# Patient Record
Sex: FEMALE | Race: WHITE | NOT HISPANIC OR LATINO | Employment: OTHER | URBAN - METROPOLITAN AREA
[De-identification: names, ages, dates, MRNs, and addresses within clinical notes are randomized per-mention and may not be internally consistent; named-entity substitution may affect disease eponyms.]

---

## 2017-01-09 ENCOUNTER — OFFICE VISIT (OUTPATIENT)
Dept: CARDIOLOGY | Facility: CLINIC | Age: 77
End: 2017-01-09

## 2017-01-09 VITALS
WEIGHT: 158 LBS | SYSTOLIC BLOOD PRESSURE: 118 MMHG | HEIGHT: 62 IN | HEART RATE: 71 BPM | BODY MASS INDEX: 29.08 KG/M2 | DIASTOLIC BLOOD PRESSURE: 78 MMHG

## 2017-01-09 DIAGNOSIS — E78.2 MIXED HYPERLIPIDEMIA: ICD-10-CM

## 2017-01-09 DIAGNOSIS — I25.10 CORONARY ARTERY DISEASE INVOLVING NATIVE CORONARY ARTERY OF NATIVE HEART WITHOUT ANGINA PECTORIS: ICD-10-CM

## 2017-01-09 DIAGNOSIS — I10 ESSENTIAL HYPERTENSION: Primary | ICD-10-CM

## 2017-01-09 DIAGNOSIS — F48.8 PSYCHOGENIC SYNCOPE: ICD-10-CM

## 2017-01-09 PROCEDURE — 99214 OFFICE O/P EST MOD 30 MIN: CPT | Performed by: PHYSICIAN ASSISTANT

## 2017-01-09 RX ORDER — LOSARTAN POTASSIUM 100 MG/1
100 TABLET ORAL DAILY
Qty: 30 TABLET | Refills: 11 | Status: SHIPPED | OUTPATIENT
Start: 2017-01-09 | End: 2017-02-16

## 2017-01-09 NOTE — PROGRESS NOTES
[facility] at [facility] - Office Note  Nicolle Chavez         [address] [address]  1940   [phone] (home)      LOCATION:  [place] office.  Visit Type: Follow Up.    PCP:  Christelle Gamez,     01/09/17   Nicolle Chavez is a 76 y.o.  female  retired.      Chief Complaint: FU on Coronary Artery Disease, HTN.  Syncope ×1.  PROBLEM LIST:   1. Coronary artery disease:  a. Field ST-elevation myocardial infarction, 11/25/2015, with associated chest pain, jaw pain, diaphoresis, and dyspnea; inferior-anterior ST elevation by outside telemetry strips.   b. Emergent cardiac catheterization, Dr. Isael Rosenbaum: Status post drug-eluting stent to the proximal right coronary artery using a 2.25 x 23 mm Xience Alpine stent.   c. Ejection fraction 55% to 60%.   2. Benign hypertension with recent uncontrolled readings.   3. Dyspnea.   4. Dyslipidemia.   5. Hypothyroidism, on chronic supplementation.   6. 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   7. Lower extremity edema.   8. History of peptic ulcer disease and bleeding ulcer approximately 12 years ago; incomplete database.   9. Family history of coronary disease.   10. Anxiety.   11. Gout.   12. Surgical history:  a. Hysterectomy.   b. Left parathyroidectomy.  c. Ulcer repair.       Allergies   Allergen Reactions   • Crestor [Rosuvastatin Calcium] Myalgia   • Lipitor [Atorvastatin] Myalgia   • Pravachol [Pravastatin Sodium] Myalgia   • Statins    • Zocor [Simvastatin] Myalgia   • Aspirin Rash   • Bactrim [Sulfamethoxazole-Trimethoprim] Rash         Current Outpatient Prescriptions:   •  aspirin 81 MG tablet, Take 1 tablet by mouth daily., Disp: , Rfl:   •  atenolol (TENORMIN) 50 MG tablet, Take 0.5 tablets by mouth daily., Disp: , Rfl:   •  cinacalcet (SENSIPAR) 60 MG tablet, Take 1 tablet by mouth daily., Disp: , Rfl:   •  clopidogrel (PLAVIX) 75 MG tablet, TAKE 1 TABLET  BY MOUTH ONE TIME A DAY , Disp: 30 tablet, Rfl: 10  •  denosumab (PROLIA) 60 MG/ML solution syringe, Inject  under the skin 1 (one) time., Disp: , Rfl:   •  doxazosin (CARDURA) 2 MG tablet, Take 1 tablet by mouth 2 (two) times a day., Disp: , Rfl:   •  Krill Oil 300 MG capsule, Take 1 capsule by mouth daily., Disp: , Rfl:   •  levothyroxine (SYNTHROID, LEVOTHROID) 88 MCG tablet, TAKE 1 TABLET BY MOUTH ONE TIME A DAY , Disp: 90 tablet, Rfl: 1  •  losartan-hydrochlorothiazide (HYZAAR) 100-25 MG per tablet, Take 1 tablet by mouth daily., Disp: , Rfl:   •  magnesium oxide (MAG-OX) 400 MG tablet, Take 400 mg by mouth daily., Disp: , Rfl:   •  Multiple Vitamins-Minerals (MULTIVITAMIN ADULT PO), Take  by mouth Daily., Disp: , Rfl:   •  Multiple Vitamins-Minerals (OCUVITE PO), Take  by mouth Daily., Disp: , Rfl:   •  pitavastatin calcium (LIVALO) 2 MG tablet tablet, Take 1 tablet by mouth every night., Disp: 30 tablet, Rfl: 2    HPI  Nicolle is here for routine follow-up on her coronary disease, hypertension dyslipidemia.  At her last visit she was having bouts of hypotension and dizziness.  The dizziness has resolved but she continues to have profound fluctuations in her blood pressure.  The other day she was at the top of a staircase when she stood up to walk down and had a syncopal event at the last 2 stairs.  She hurt her left leg ,bumped her head on the stairwell, and continues to have discomfort in that leg.  She did not seek medical care at that time.  She did not take her blood pressure at that time.  At its lowest, systolic blood pressure has been 90.  At its highest it has been 150-160.  She is taking the Cardura once a day, atenolol at night, Hyzaar mid afternoon.  Possibly one hour after taking the Hyzaar she feels lethargic and weak.  She has no angina or exertional dyspnea.  She does notice some swelling in her legs at baseline prior to the injury.    The following portions of the patient's history were  "reviewed in the chart and updated as appropriate: allergies, current medications, past family history, past medical history, past social history, past surgical history and problem list.    Review of Systems   Constitution: Positive for weakness.   Cardiovascular: Positive for leg swelling and syncope.   All other systems reviewed and are negative.            height is 62\" (157.5 cm) and weight is 158 lb (71.7 kg). Her blood pressure is 118/78 and her pulse is 71.   Physical Exam   Constitutional: Vital signs are normal. She appears well-developed and well-nourished.   Cardiovascular: Normal rate, regular rhythm, S1 normal, S2 normal, normal heart sounds, intact distal pulses and normal pulses.    Pulmonary/Chest: Effort normal and breath sounds normal. She has no wheezes. She has no rhonchi. She has no rales.   Abdominal: Soft. Normal appearance and bowel sounds are normal. There is no hepatosplenomegaly.   Neurological: She is alert.         Procedures     Assessment/ Plan     Essential hypertension:  I believe her syncopal event was due to orthostasis.  At this time I'm going to continue with the atenolol but have her take it in the morning.  I'm going to stop the Hyzaar and change it to Cozaar 100 mg daily.  She is to take this proximal me 2 hours after the atenolol.  I'm going to cut out Cardura altogether.  She is to keep a blood pressure log for the next week.  We will call her in our clinic next week to review her recordings.  I'm also going to schedule her for a Lexiscan Cardiolite to make sure she's had no changes to her coronary arteries or blood flow.  Lexiscan is being used secondary to her recent leg injury.  I'm also going to reassess her leg injury at that time although I do not physically see bruising, erythema or skin changes.    Coronary artery disease involving native coronary artery of native heart without angina pectoris:  Again, a Lexiscan Cardiolite will be ordered to assess for blood " flow.    Mixed hyperlipidemia:  Continue on Livalo every other day as this is what she can tolerate.    Radha Graves PA-C

## 2017-01-09 NOTE — LETTER
January 9, 2017     Christelle Gamez DO  [address]  [address]  [place] [address]    Patient: Nicolle Chavez   YOB: 1940   Date of Visit: 1/9/2017       Dear Dr. Gamez, :    Thank you for referring Nicolle Chavez to me for evaluation. Below are the relevant portions of my assessment and plan of care.    If you have questions, please do not hesitate to call me. I look forward to following Nicolle along with you.         Sincerely,        FRANCOIS Castro        CC: No Recipients  FRANCOIS Castro  1/9/2017  2:19 PM  Signed       [facility] at [facility] - Office Note  Nicolle Chavez         [address] [facility] [place] [address]  1940   [phone] (home)      LOCATION:  [place] office.  Visit Type: Follow Up.    PCP:  Christelle Gamez DO    01/09/17   Nicolle Chavez is a 76 y.o.  female  retired.      Chief Complaint: FU on Coronary Artery Disease, HTN.  Syncope ×1.  PROBLEM LIST:   1. Coronary artery disease:  a. Field ST-elevation myocardial infarction, 11/25/2015, with associated chest pain, jaw pain, diaphoresis, and dyspnea; inferior-anterior ST elevation by outside telemetry strips.   b. Emergent cardiac catheterization, Dr. Isael Rosenbaum: Status post drug-eluting stent to the proximal right coronary artery using a 2.25 x 23 mm Xience Alpine stent.   c. Ejection fraction 55% to 60%.   2. Benign hypertension with recent uncontrolled readings.   3. Dyspnea.   4. Dyslipidemia.   5. Hypothyroidism, on chronic supplementation.   6. 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   7. Lower extremity edema.   8. History of peptic ulcer disease and bleeding ulcer approximately 12 years ago; incomplete database.   9. Family history of coronary disease.   10. Anxiety.   11. Gout.   12. Surgical history:  a. Hysterectomy.   b. Left parathyroidectomy.  c. Ulcer repair.       Allergies   Allergen  Reactions   • Crestor [Rosuvastatin Calcium] Myalgia   • Lipitor [Atorvastatin] Myalgia   • Pravachol [Pravastatin Sodium] Myalgia   • Statins    • Zocor [Simvastatin] Myalgia   • Aspirin Rash   • Bactrim [Sulfamethoxazole-Trimethoprim] Rash         Current Outpatient Prescriptions:   •  aspirin 81 MG tablet, Take 1 tablet by mouth daily., Disp: , Rfl:   •  atenolol (TENORMIN) 50 MG tablet, Take 0.5 tablets by mouth daily., Disp: , Rfl:   •  cinacalcet (SENSIPAR) 60 MG tablet, Take 1 tablet by mouth daily., Disp: , Rfl:   •  clopidogrel (PLAVIX) 75 MG tablet, TAKE 1 TABLET BY MOUTH ONE TIME A DAY , Disp: 30 tablet, Rfl: 10  •  denosumab (PROLIA) 60 MG/ML solution syringe, Inject  under the skin 1 (one) time., Disp: , Rfl:   •  doxazosin (CARDURA) 2 MG tablet, Take 1 tablet by mouth 2 (two) times a day., Disp: , Rfl:   •  Krill Oil 300 MG capsule, Take 1 capsule by mouth daily., Disp: , Rfl:   •  levothyroxine (SYNTHROID, LEVOTHROID) 88 MCG tablet, TAKE 1 TABLET BY MOUTH ONE TIME A DAY , Disp: 90 tablet, Rfl: 1  •  losartan-hydrochlorothiazide (HYZAAR) 100-25 MG per tablet, Take 1 tablet by mouth daily., Disp: , Rfl:   •  magnesium oxide (MAG-OX) 400 MG tablet, Take 400 mg by mouth daily., Disp: , Rfl:   •  Multiple Vitamins-Minerals (MULTIVITAMIN ADULT PO), Take  by mouth Daily., Disp: , Rfl:   •  Multiple Vitamins-Minerals (OCUVITE PO), Take  by mouth Daily., Disp: , Rfl:   •  pitavastatin calcium (LIVALO) 2 MG tablet tablet, Take 1 tablet by mouth every night., Disp: 30 tablet, Rfl: 2    HPI  Nicolle is here for routine follow-up on her coronary disease, hypertension dyslipidemia.  At her last visit she was having bouts of hypotension and dizziness.  The dizziness has resolved but she continues to have profound fluctuations in her blood pressure.  The other day she was at the top of a staircase when she stood up to walk down and had a syncopal event at the last 2 stairs.  She hurt her left leg ,bumped her head on  "the stairwell, and continues to have discomfort in that leg.  She did not seek medical care at that time.  She did not take her blood pressure at that time.  At its lowest, systolic blood pressure has been 90.  At its highest it has been 150-160.  She is taking the Cardura once a day, atenolol at night, Hyzaar mid afternoon.  Possibly one hour after taking the Hyzaar she feels lethargic and weak.  She has no angina or exertional dyspnea.  She does notice some swelling in her legs at baseline prior to the injury.    The following portions of the patient's history were reviewed in the chart and updated as appropriate: allergies, current medications, past family history, past medical history, past social history, past surgical history and problem list.    Review of Systems   Constitution: Positive for weakness.   Cardiovascular: Positive for leg swelling and syncope.   All other systems reviewed and are negative.            height is 62\" (157.5 cm) and weight is 158 lb (71.7 kg). Her blood pressure is 118/78 and her pulse is 71.   Physical Exam   Constitutional: Vital signs are normal. She appears well-developed and well-nourished.   Cardiovascular: Normal rate, regular rhythm, S1 normal, S2 normal, normal heart sounds, intact distal pulses and normal pulses.    Pulmonary/Chest: Effort normal and breath sounds normal. She has no wheezes. She has no rhonchi. She has no rales.   Abdominal: Soft. Normal appearance and bowel sounds are normal. There is no hepatosplenomegaly.   Neurological: She is alert.         Procedures     Assessment/ Plan     Essential hypertension:  I believe her syncopal event was due to orthostasis.  At this time I'm going to continue with the atenolol but have her take it in the morning.  I'm going to stop the Hyzaar and change it to Cozaar 100 mg daily.  She is to take this proximal me 2 hours after the atenolol.  I'm going to cut out Cardura altogether.  She is to keep a blood pressure log for " the next week.  We will call her in our clinic next week to review her recordings.  I'm also going to schedule her for a Lexiscan Cardiolite to make sure she's had no changes to her coronary arteries or blood flow.  Lexiscan is being used secondary to her recent leg injury.  I'm also going to reassess her leg injury at that time although I do not physically see bruising, erythema or skin changes.    Coronary artery disease involving native coronary artery of native heart without angina pectoris:  Again, a Lexiscan Cardiolite will be ordered to assess for blood flow.    Mixed hyperlipidemia:  Continue on Livalo every other day as this is what she can tolerate.    Radha Graves PA-C

## 2017-01-10 ENCOUNTER — APPOINTMENT (OUTPATIENT)
Dept: CARDIOLOGY | Facility: HOSPITAL | Age: 77
End: 2017-01-10

## 2017-01-16 ENCOUNTER — TELEPHONE (OUTPATIENT)
Dept: CARDIOLOGY | Facility: CLINIC | Age: 77
End: 2017-01-16

## 2017-01-18 ENCOUNTER — HOSPITAL ENCOUNTER (OUTPATIENT)
Dept: CARDIOLOGY | Facility: HOSPITAL | Age: 77
Discharge: HOME | End: 2017-01-18

## 2017-01-18 ENCOUNTER — HOSPITAL ENCOUNTER (OUTPATIENT)
Dept: CARDIOLOGY | Facility: HOSPITAL | Age: 77
Discharge: HOME | End: 2017-01-18
Admitting: PHYSICIAN ASSISTANT

## 2017-01-18 DIAGNOSIS — F48.8 PSYCHOGENIC SYNCOPE: ICD-10-CM

## 2017-01-18 DIAGNOSIS — I25.10 CORONARY ARTERY DISEASE INVOLVING NATIVE CORONARY ARTERY OF NATIVE HEART WITHOUT ANGINA PECTORIS: ICD-10-CM

## 2017-01-18 LAB
BH CV NUCLEAR PRIOR STUDY: 2
BH CV STRESS BP STAGE 1: NORMAL
BH CV STRESS BP STAGE 2: NORMAL
BH CV STRESS BP STAGE 3: NORMAL
BH CV STRESS COMMENTS STAGE 1: NORMAL
BH CV STRESS COMMENTS STAGE 2: NORMAL
BH CV STRESS DOSE REGADENOSON STAGE 1: 0.4
BH CV STRESS DURATION MIN STAGE 1: 1
BH CV STRESS DURATION MIN STAGE 2: 3
BH CV STRESS DURATION MIN STAGE 3: 6
BH CV STRESS DURATION SEC STAGE 1: 0
BH CV STRESS DURATION SEC STAGE 2: 0
BH CV STRESS DURATION SEC STAGE 3: 0
BH CV STRESS HR STAGE 1: 71
BH CV STRESS HR STAGE 2: 96
BH CV STRESS HR STAGE 3: 86
BH CV STRESS PROTOCOL 1: NORMAL
BH CV STRESS RECOVERY BP: NORMAL MMHG
BH CV STRESS RECOVERY HR: 86 BPM
BH CV STRESS STAGE 1: 1
BH CV STRESS STAGE 2: 2
BH CV STRESS STAGE 3: 3
LV EF NUC BP: 68 %
MAXIMAL PREDICTED HEART RATE: 142 BPM
PERCENT MAX PREDICTED HR: 72.54 %
STRESS BASELINE BP: NORMAL MMHG
STRESS BASELINE HR: 62 BPM
STRESS PERCENT HR: 85 %
STRESS POST PEAK BP: NORMAL MMHG
STRESS POST PEAK HR: 103 BPM
STRESS TARGET HR: 121 BPM

## 2017-01-18 PROCEDURE — 78452 HT MUSCLE IMAGE SPECT MULT: CPT | Performed by: INTERNAL MEDICINE

## 2017-01-18 PROCEDURE — 93018 CV STRESS TEST I&R ONLY: CPT | Performed by: INTERNAL MEDICINE

## 2017-01-18 PROCEDURE — 78452 HT MUSCLE IMAGE SPECT MULT: CPT

## 2017-01-18 PROCEDURE — 25010000002 ONDANSETRON PER 1 MG: Performed by: INTERNAL MEDICINE

## 2017-01-18 PROCEDURE — 0 TECHNETIUM SESTAMIBI: Performed by: INTERNAL MEDICINE

## 2017-01-18 PROCEDURE — 25010000002 REGADENOSON 0.4 MG/5ML SOLUTION: Performed by: INTERNAL MEDICINE

## 2017-01-18 PROCEDURE — A9500 TC99M SESTAMIBI: HCPCS | Performed by: INTERNAL MEDICINE

## 2017-01-18 PROCEDURE — 93017 CV STRESS TEST TRACING ONLY: CPT

## 2017-01-18 RX ORDER — ONDANSETRON 2 MG/ML
4 INJECTION INTRAMUSCULAR; INTRAVENOUS ONCE
Status: COMPLETED | OUTPATIENT
Start: 2017-01-18 | End: 2017-01-18

## 2017-01-18 RX ADMIN — Medication 1 DOSE: at 08:15

## 2017-01-18 RX ADMIN — Medication 1 DOSE: at 09:50

## 2017-01-18 RX ADMIN — ONDANSETRON 4 MG: 2 INJECTION, SOLUTION INTRAMUSCULAR; INTRAVENOUS at 09:58

## 2017-01-18 RX ADMIN — REGADENOSON 0.4 MG: 0.08 INJECTION, SOLUTION INTRAVENOUS at 09:50

## 2017-01-18 NOTE — TELEPHONE ENCOUNTER
Spoke with Patient to let her know that FRANCOIS Mejía did not want to change any medications at this time. Patient stated that her B/P was high this am for her Stress Test.  She stated it was 199/100 or something like that, Patient did not know heart rate at that time. Will let Radha know.

## 2017-01-25 ENCOUNTER — TELEPHONE (OUTPATIENT)
Dept: CARDIOLOGY | Facility: CLINIC | Age: 77
End: 2017-01-25

## 2017-01-25 NOTE — TELEPHONE ENCOUNTER
Called to check on Patient's B/P's. Patient stated she is still experiencing high B/P's, 182/113, 160/103, 143/94.  She wanted to know if you would like to make any medication  changes or should she make an apt to see you? Please Advise. I let Patient know it probably would be Monday before I got back with her. She verbalized understanding and was ok with that.

## 2017-01-30 RX ORDER — DOXAZOSIN 2 MG/1
2 TABLET ORAL NIGHTLY
Qty: 30 TABLET | Refills: 6 | Status: SHIPPED | OUTPATIENT
Start: 2017-01-30 | End: 2017-04-10

## 2017-01-30 NOTE — TELEPHONE ENCOUNTER
Spoke with Patient to let her know Radha Graves would be ordering her Cardura 2 mg Q PM, Medication sent to [facility] in [facility]. Per EHR. Patient to call and let us know how her B/P's are doing. Patient verbalized understanding.

## 2017-02-16 ENCOUNTER — OFFICE VISIT (OUTPATIENT)
Dept: CARDIOLOGY | Facility: CLINIC | Age: 77
End: 2017-02-16

## 2017-02-16 VITALS
HEIGHT: 63 IN | DIASTOLIC BLOOD PRESSURE: 100 MMHG | SYSTOLIC BLOOD PRESSURE: 198 MMHG | WEIGHT: 160 LBS | BODY MASS INDEX: 28.35 KG/M2 | HEART RATE: 76 BPM

## 2017-02-16 DIAGNOSIS — E78.2 MIXED HYPERLIPIDEMIA: ICD-10-CM

## 2017-02-16 DIAGNOSIS — I25.10 CORONARY ARTERY DISEASE INVOLVING NATIVE CORONARY ARTERY OF NATIVE HEART WITHOUT ANGINA PECTORIS: ICD-10-CM

## 2017-02-16 DIAGNOSIS — I10 ESSENTIAL HYPERTENSION: Primary | ICD-10-CM

## 2017-02-16 PROCEDURE — 99214 OFFICE O/P EST MOD 30 MIN: CPT | Performed by: PHYSICIAN ASSISTANT

## 2017-02-16 RX ORDER — CHLORTHALIDONE 25 MG/1
50 TABLET ORAL DAILY
Qty: 30 TABLET | Refills: 11 | Status: SHIPPED | OUTPATIENT
Start: 2017-02-16 | End: 2017-04-10

## 2017-02-16 RX ORDER — METOPROLOL SUCCINATE 50 MG/1
50 TABLET, EXTENDED RELEASE ORAL DAILY
Qty: 30 TABLET | Refills: 11 | Status: SHIPPED | OUTPATIENT
Start: 2017-02-16 | End: 2017-03-09

## 2017-02-16 NOTE — PROGRESS NOTES
[facility] at [facility] - Office Note  Nicolle Chavez         [address] [facility] [address]  1940   [phone] (home)      LOCATION:  [place] office.  Visit Type: Follow Up.    PCP:  Christelle Gamez,     02/16/17   Nicolle Chavez is a 76 y.o.  female  retired.      Chief Complaint: Follow Up on CAD, HTN.  PROBLEM LIST:   1. Coronary artery disease:  a. Field ST-elevation myocardial infarction, 11/25/2015, with associated chest pain, jaw pain, diaphoresis, and dyspnea; inferior-anterior ST elevation by outside telemetry strips.   b. Emergent cardiac catheterization, Dr. Isael Rosenbaum: Status post drug-eluting stent to the proximal right coronary artery using a 2.25 x 23 mm Xience Alpine stent.   c. Ejection fraction 55% to 60%.   2. Uncontrolled Essential HTN.  3. Dyspnea.   4. Dyslipidemia.   5. Hypothyroidism, on chronic supplementation.   6. 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   7. Lower extremity edema.   8. History of peptic ulcer disease and bleeding ulcer approximately 12 years ago; incomplete database.   9. Family history of coronary disease.   10. Anxiety.   11. Gout.   12. Surgical history:  a. Hysterectomy.   b. Left parathyroidectomy.  c. Ulcer repair.          Allergies   Allergen Reactions   • Crestor [Rosuvastatin Calcium] Myalgia   • Lipitor [Atorvastatin] Myalgia   • Pravachol [Pravastatin Sodium] Myalgia   • Statins    • Zocor [Simvastatin] Myalgia   • Aspirin Rash   • Bactrim [Sulfamethoxazole-Trimethoprim] Rash         Current Outpatient Prescriptions:   •  aspirin 81 MG tablet, Take 1 tablet by mouth daily., Disp: , Rfl:   •  cinacalcet (SENSIPAR) 60 MG tablet, Take 1 tablet by mouth daily., Disp: , Rfl:   •  clopidogrel (PLAVIX) 75 MG tablet, TAKE 1 TABLET BY MOUTH ONE TIME A DAY , Disp: 30 tablet, Rfl: 10  •  denosumab (PROLIA) 60 MG/ML solution syringe, Inject 60 mg under the skin 1 (One)  Time. Yearly, Disp: , Rfl:   •  doxazosin (CARDURA) 2 MG tablet, Take 1 tablet by mouth Every Night., Disp: 30 tablet, Rfl: 6  •  Krill Oil 300 MG capsule, Take 1 capsule by mouth daily., Disp: , Rfl:   •  levothyroxine (SYNTHROID, LEVOTHROID) 88 MCG tablet, TAKE 1 TABLET BY MOUTH ONE TIME A DAY , Disp: 90 tablet, Rfl: 1  •  magnesium oxide (MAG-OX) 400 MG tablet, Take 400 mg by mouth daily., Disp: , Rfl:   •  Multiple Vitamins-Minerals (OCUVITE PO), Take  by mouth Daily., Disp: , Rfl:   •  pitavastatin calcium (LIVALO) 2 MG tablet tablet, Take 1 tablet by mouth every night. (Patient taking differently: Take 2 mg by mouth Every Other Day. At night.), Disp: 30 tablet, Rfl: 2  •  chlorthalidone (HYGROTON) 25 MG tablet, Take 2 tablets by mouth Daily., Disp: 30 tablet, Rfl: 11  •  metoprolol succinate XL (TOPROL-XL) 50 MG 24 hr tablet, Take 1 tablet by mouth Daily., Disp: 30 tablet, Rfl: 11    LADARIUS Valverde is here for a follow-up on her hypertension and stress test.  Last time we saw her, she was having elevated readings.  I ordered a stress test as it had been a while since her coronary disease was evaluated.  The stress test shows a normal EF and no evidence of ischemia.  She continues to have elevated blood pressure readings particularly first thing in the morning and late evening.  She also complains of swelling in her legs since coming off the Hyzaar and would like to be placed back on a diuretic.  She's had no recent blood work to monitor her renal function.  She denies chest pain, denies dizziness or syncope.  She does feel like there is a pressure behind her eyes and swimmy headed at times when she has a lot going on.    The following portions of the patient's history were reviewed in the chart and updated as appropriate: allergies, current medications, past family history, past medical history, past social history, past surgical history and problem list.    Review of Systems   Constitution: Positive for  "malaise/fatigue.   Cardiovascular: Positive for leg swelling.   Neurological: Positive for light-headedness.   All other systems reviewed and are negative.        height is 62.5\" (158.8 cm) and weight is 160 lb (72.6 kg). Her blood pressure is 198/100 (abnormal) and her pulse is 76.   Physical Exam   Constitutional: Vital signs are normal. She appears well-developed and well-nourished.   Cardiovascular: Normal rate, regular rhythm, S1 normal, S2 normal, normal heart sounds, intact distal pulses and normal pulses.    Pulmonary/Chest: Effort normal and breath sounds normal. She has no wheezes. She has no rhonchi. She has no rales.   Abdominal: Soft. Normal appearance and bowel sounds are normal. There is no hepatosplenomegaly.   Neurological: She is alert.         Procedures     Assessment/ Plan   Essential hypertension:  I reviewed Nicolle's case with Dr. Isael Garzon who is in clinic today.  We reviewed her medications and her past medical history.  He suggests stopping the atenolol and replacing it with Toprol-XL 50 mg daily.  He suggests taking her off the losartan and replacing it with a Niharika 40 mg daily.  For her swelling he recommends chlorthalidone over HCTZ, 50 mg by mouth daily with monitoring of her renal function.  I wrote this down on an instruction sheet for the patient and went over it verbally as well.  A BMP is to be drawn next Wednesday, February 22 and she will follow-up with me next Thursday, February 23 in the [facility].  Further recommendations will be based on blood pressure readings and patient's symptoms at that time.  I spent 25 minutes discussing hypertension alone.  -     metoprolol succinate XL (TOPROL-XL) 50 MG 24 hr tablet; Take 1 tablet by mouth Daily.  -     chlorthalidone (HYGROTON) 25 MG tablet; Take 2 tablets by mouth Daily.        -     edarbi 40 mg 1 tablet by mouth daily.  No Rx given.  Samples given.  Mixed hyperlipidemia:  Continue on Livalo with routine bloodwork by " PCP.    Coronary artery disease involving native coronary artery of native heart without angina pectoris:  Stable and asymptomatic.  I reviewed the results of her normal stress test with her.      Radha Graves PA-C  2/16/2017 12:36 PM      EMR Dragon/Transcription disclaimer:   Much of this encounter note is an electronic transcription/translation of spoken language to printed text. The electronic translation of spoken language may permit erroneous, or at times, nonsensical words or phrases to be inadvertently transcribed; Although I have reviewed the note for such errors, some may still exist.

## 2017-02-22 ENCOUNTER — APPOINTMENT (OUTPATIENT)
Dept: LAB | Facility: HOSPITAL | Age: 77
End: 2017-02-22

## 2017-02-22 LAB
ANION GAP SERPL CALCULATED.3IONS-SCNC: 6 MMOL/L (ref 3–11)
BUN BLD-MCNC: 25 MG/DL (ref 9–23)
BUN/CREAT SERPL: 20.8 (ref 7–25)
CALCIUM SPEC-SCNC: 10.7 MG/DL (ref 8.7–10.4)
CHLORIDE SERPL-SCNC: 98 MMOL/L (ref 99–109)
CO2 SERPL-SCNC: 34 MMOL/L (ref 20–31)
CREAT BLD-MCNC: 1.2 MG/DL (ref 0.6–1.3)
GFR SERPL CREATININE-BSD FRML MDRD: 43 ML/MIN/1.73
GLUCOSE BLD-MCNC: 89 MG/DL (ref 70–100)
POTASSIUM BLD-SCNC: 3.8 MMOL/L (ref 3.5–5.5)
SODIUM BLD-SCNC: 138 MMOL/L (ref 132–146)

## 2017-02-22 PROCEDURE — 80048 BASIC METABOLIC PNL TOTAL CA: CPT | Performed by: PHYSICIAN ASSISTANT

## 2017-02-22 PROCEDURE — 36415 COLL VENOUS BLD VENIPUNCTURE: CPT | Performed by: PHYSICIAN ASSISTANT

## 2017-02-23 ENCOUNTER — OFFICE VISIT (OUTPATIENT)
Dept: CARDIOLOGY | Facility: CLINIC | Age: 77
End: 2017-02-23

## 2017-02-23 VITALS
WEIGHT: 154 LBS | BODY MASS INDEX: 28.34 KG/M2 | SYSTOLIC BLOOD PRESSURE: 128 MMHG | HEIGHT: 62 IN | DIASTOLIC BLOOD PRESSURE: 80 MMHG | HEART RATE: 65 BPM

## 2017-02-23 DIAGNOSIS — I25.10 CORONARY ARTERY DISEASE INVOLVING NATIVE CORONARY ARTERY OF NATIVE HEART WITHOUT ANGINA PECTORIS: ICD-10-CM

## 2017-02-23 DIAGNOSIS — E78.2 MIXED HYPERLIPIDEMIA: ICD-10-CM

## 2017-02-23 DIAGNOSIS — I10 ESSENTIAL HYPERTENSION: Primary | ICD-10-CM

## 2017-02-23 PROCEDURE — 99212 OFFICE O/P EST SF 10 MIN: CPT | Performed by: PHYSICIAN ASSISTANT

## 2017-02-23 NOTE — PROGRESS NOTES
[facility] at [facility] - Office Note  Nicolle Chavez         [address] [address]  1940   [phone] (home)      LOCATION:  [facility].  Visit Type: Follow Up.    PCP:  Christelle Gamez,     02/23/17   Nicolle Chavez is a 76 y.o.  female  retired.      Chief Complaint: Follow up on HTN.  PROBLEM LIST:   1. Coronary artery disease:  a. Field ST-elevation myocardial infarction, 11/25/2015, with associated chest pain, jaw pain, diaphoresis, and dyspnea; inferior-anterior ST elevation by outside telemetry strips.   b. Emergent cardiac catheterization, Dr. Isael Rosenbaum: Status post drug-eluting stent to the proximal right coronary artery using a 2.25 x 23 mm Xience Alpine stent.   c. Ejection fraction 55% to 60%.   2. Uncontrolled Essential HTN.  3. Dyspnea.   4. Dyslipidemia.   5. Hypothyroidism, on chronic supplementation.   6. 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   7. Lower extremity edema.   8. History of peptic ulcer disease and bleeding ulcer approximately 12 years ago; incomplete database.   9. Family history of coronary disease.   10. Anxiety.   11. Gout.   12. Surgical history:  a. Hysterectomy.   b. Left parathyroidectomy.  c. Ulcer repair.              Allergies   Allergen Reactions   • Crestor [Rosuvastatin Calcium] Myalgia   • Lipitor [Atorvastatin] Myalgia   • Pravachol [Pravastatin Sodium] Myalgia   • Statins    • Zocor [Simvastatin] Myalgia   • Aspirin Rash   • Bactrim [Sulfamethoxazole-Trimethoprim] Rash         Current Outpatient Prescriptions:   •  aspirin 81 MG tablet, Take 1 tablet by mouth daily., Disp: , Rfl:   •  Azilsartan Medoxomil (EDARBI) 40 MG tablet, Take 40 mg by mouth Daily., Disp: , Rfl:   •  chlorthalidone (HYGROTON) 25 MG tablet, Take 2 tablets by mouth Daily., Disp: 30 tablet, Rfl: 11  •  cinacalcet (SENSIPAR) 60 MG tablet, Take 1 tablet by mouth daily., Disp: , Rfl:   •   clopidogrel (PLAVIX) 75 MG tablet, TAKE 1 TABLET BY MOUTH ONE TIME A DAY , Disp: 30 tablet, Rfl: 10  •  denosumab (PROLIA) 60 MG/ML solution syringe, Inject 60 mg under the skin 1 (One) Time. Yearly, Disp: , Rfl:   •  doxazosin (CARDURA) 2 MG tablet, Take 1 tablet by mouth Every Night., Disp: 30 tablet, Rfl: 6  •  Krill Oil 300 MG capsule, Take 1 capsule by mouth daily., Disp: , Rfl:   •  levothyroxine (SYNTHROID, LEVOTHROID) 88 MCG tablet, TAKE 1 TABLET BY MOUTH ONE TIME A DAY , Disp: 90 tablet, Rfl: 1  •  magnesium oxide (MAG-OX) 400 MG tablet, Take 400 mg by mouth daily., Disp: , Rfl:   •  metoprolol succinate XL (TOPROL-XL) 50 MG 24 hr tablet, Take 1 tablet by mouth Daily., Disp: 30 tablet, Rfl: 11  •  Multiple Vitamins-Minerals (OCUVITE PO), Take  by mouth Daily., Disp: , Rfl:   •  pitavastatin calcium (LIVALO) 2 MG tablet tablet, Take 1 tablet by mouth every night. (Patient taking differently: Take 2 mg by mouth Every Other Day. At night.), Disp: 30 tablet, Rfl: 2    HPI  Nicolle is here today for a 1 week scheduled appt to follow up on poorly controlled HTN with multiple medications adjustments made at the last office visit.  For the first few days, her blood pressure remained high and then it has slowly lowered.  She is now running hypo-tensive and is fairly asymptomatic.  She's had no falls or syncope but feels weak and even nauseated.  Her blood pressures are only low first thing in the morning until approximately 10 AM.  Other than feeling like this, she's had no perceived side effects from her medications otherwise.        The following portions of the patient's history were reviewed in the chart and updated as appropriate: allergies, current medications, past family history, past medical history, past social history, past surgical history and problem list.    Review of Systems   Constitution: Positive for weakness.   Gastrointestinal: Positive for nausea.   All other systems reviewed and are negative.    "     height is 62\" (157.5 cm) and weight is 154 lb (69.9 kg). Her blood pressure is 128/80 and her pulse is 65.   Physical Exam   Constitutional: Vital signs are normal. She appears well-developed and well-nourished.   Cardiovascular: Normal rate, regular rhythm, S1 normal, S2 normal, normal heart sounds, intact distal pulses and normal pulses.    Pulmonary/Chest: Effort normal and breath sounds normal. She has no wheezes. She has no rhonchi. She has no rales.   Abdominal: Soft. Normal appearance and bowel sounds are normal. There is no hepatosplenomegaly.   Neurological: She is alert.         Procedures   I reviewed her BMP from February 22, 2017: BUNs 25, creatinine 1.2.  We'll continue to monitor with diuretics.  Assessment/ Plan     Essential hypertension:  Much better control on the current medical regimen.  However, she's having hypotension particularly in the morning hours.  I'm going to stop her Cardura which she takes at night.  This is to start today.  We will call her Wednesday morning to see how her blood pressures have been over the weekend.  If she continues to have low blood pressure readings in the morning, we could consider cutting back on her chlorthalidone or Toprol-XL.  I did review her blood work from yesterday which is essentially within normal limits.  Further recommendations will be based on blood pressure readings over the weekend.  Otherwise, she is to follow-up with us in 3 months and [place] or sooner when necessary.    Mixed hyperlipidemia:  Continue Livalo and appropriate diet.  Get routine bloodwork with PCP.    Coronary artery disease involving native coronary artery of native heart without angina pectoris:  Stable and asymptomatic.  Continue on medications.        Radha Graves PA-C  2/23/2017 12:54 PM      EMR Dragon/Transcription disclaimer:   Much of this encounter note is an electronic transcription/translation of spoken language to printed text. The electronic translation " of spoken language may permit erroneous, or at times, nonsensical words or phrases to be inadvertently transcribed; Although I have reviewed the note for such errors, some may still exist.

## 2017-02-27 ENCOUNTER — TELEPHONE (OUTPATIENT)
Dept: CARDIOLOGY | Facility: CLINIC | Age: 77
End: 2017-02-27

## 2017-02-27 NOTE — TELEPHONE ENCOUNTER
Spoke with Patient to find out how she is doing After stopping her Cardura, Patient stated still feels a little weak, SOA after climbing stairs. B/P 2/23 AM 85/56 PM  117/80,  2/24  104/82,  2/25  /74, PM 1/2/70,  2/26 /73, /85.  No changes at this time per FRANCOIS Mejía. Prescription for Edarbi 40 mg QD sent to [facility] per EHR. Patient to call with any concerns or problems. Patient verbalized understanding.

## 2017-03-03 ENCOUNTER — HOSPITAL ENCOUNTER (EMERGENCY)
Facility: HOSPITAL | Age: 77
Discharge: HOME | End: 2017-03-03
Attending: EMERGENCY MEDICINE | Admitting: EMERGENCY MEDICINE

## 2017-03-03 ENCOUNTER — APPOINTMENT (OUTPATIENT)
Dept: GENERAL RADIOLOGY | Facility: HOSPITAL | Age: 77
End: 2017-03-03

## 2017-03-03 VITALS
RESPIRATION RATE: 16 BRPM | DIASTOLIC BLOOD PRESSURE: 93 MMHG | SYSTOLIC BLOOD PRESSURE: 146 MMHG | TEMPERATURE: 97.9 F | WEIGHT: 156 LBS | HEART RATE: 77 BPM | BODY MASS INDEX: 28.71 KG/M2 | OXYGEN SATURATION: 95 % | HEIGHT: 62 IN

## 2017-03-03 DIAGNOSIS — N39.0 URINARY TRACT INFECTION WITHOUT HEMATURIA, SITE UNSPECIFIED: ICD-10-CM

## 2017-03-03 DIAGNOSIS — E86.0 DEHYDRATION: ICD-10-CM

## 2017-03-03 DIAGNOSIS — E83.52 HYPERCALCEMIA: ICD-10-CM

## 2017-03-03 DIAGNOSIS — R53.1 GENERALIZED WEAKNESS: Primary | ICD-10-CM

## 2017-03-03 LAB
ALBUMIN SERPL-MCNC: 4.5 G/DL (ref 3.5–5)
ALBUMIN/GLOB SERPL: 1.4 G/DL (ref 1–2)
ALP SERPL-CCNC: 89 U/L (ref 38–126)
ALT SERPL W P-5'-P-CCNC: 24 U/L (ref 13–69)
ANION GAP SERPL CALCULATED.3IONS-SCNC: 14.5 MMOL/L
AST SERPL-CCNC: 23 U/L (ref 15–46)
BACTERIA UR QL AUTO: ABNORMAL /HPF
BASOPHILS # BLD AUTO: 0.07 10*3/MM3 (ref 0–0.2)
BASOPHILS NFR BLD AUTO: 0.9 % (ref 0–2.5)
BILIRUB SERPL-MCNC: 1 MG/DL (ref 0.2–1.3)
BILIRUB UR QL STRIP: NEGATIVE
BUN BLD-MCNC: 34 MG/DL (ref 7–20)
BUN/CREAT SERPL: 21.3 (ref 7.1–23.5)
CALCIUM SPEC-SCNC: 12.5 MG/DL (ref 8.4–10.2)
CHLORIDE SERPL-SCNC: 89 MMOL/L (ref 98–107)
CLARITY UR: CLEAR
CO2 SERPL-SCNC: 35 MMOL/L (ref 26–30)
COLOR UR: YELLOW
CREAT BLD-MCNC: 1.6 MG/DL (ref 0.6–1.3)
DEPRECATED RDW RBC AUTO: 41.4 FL (ref 37–54)
EOSINOPHIL # BLD AUTO: 0.19 10*3/MM3 (ref 0–0.7)
EOSINOPHIL NFR BLD AUTO: 2.4 % (ref 0–7)
ERYTHROCYTE [DISTWIDTH] IN BLOOD BY AUTOMATED COUNT: 12.6 % (ref 11.5–14.5)
GFR SERPL CREATININE-BSD FRML MDRD: 31 ML/MIN/1.73
GLOBULIN UR ELPH-MCNC: 3.3 GM/DL
GLUCOSE BLD-MCNC: 116 MG/DL (ref 74–98)
GLUCOSE UR STRIP-MCNC: NEGATIVE MG/DL
HCT VFR BLD AUTO: 39.7 % (ref 37–47)
HGB BLD-MCNC: 13.5 G/DL (ref 12–16)
HGB UR QL STRIP.AUTO: NEGATIVE
HYALINE CASTS UR QL AUTO: ABNORMAL /LPF
IMM GRANULOCYTES # BLD: 0.03 10*3/MM3 (ref 0–0.06)
IMM GRANULOCYTES NFR BLD: 0.4 % (ref 0–0.6)
KETONES UR QL STRIP: ABNORMAL
LEUKOCYTE ESTERASE UR QL STRIP.AUTO: ABNORMAL
LIPASE SERPL-CCNC: 63 U/L (ref 23–300)
LYMPHOCYTES # BLD AUTO: 1.24 10*3/MM3 (ref 0.6–3.4)
LYMPHOCYTES NFR BLD AUTO: 15.6 % (ref 10–50)
MCH RBC QN AUTO: 30.3 PG (ref 27–31)
MCHC RBC AUTO-ENTMCNC: 34 G/DL (ref 30–37)
MCV RBC AUTO: 89 FL (ref 81–99)
MONOCYTES # BLD AUTO: 1.46 10*3/MM3 (ref 0–0.9)
MONOCYTES NFR BLD AUTO: 18.4 % (ref 0–12)
NEUTROPHILS # BLD AUTO: 4.94 10*3/MM3 (ref 2–6.9)
NEUTROPHILS NFR BLD AUTO: 62.3 % (ref 37–80)
NITRITE UR QL STRIP: NEGATIVE
NRBC BLD MANUAL-RTO: 0 /100 WBC (ref 0–0)
PH UR STRIP.AUTO: 6 [PH] (ref 5–8)
PLATELET # BLD AUTO: 188 10*3/MM3 (ref 130–400)
PMV BLD AUTO: 10.4 FL (ref 6–12)
POTASSIUM BLD-SCNC: 3.5 MMOL/L (ref 3.5–5.1)
PROT SERPL-MCNC: 7.8 G/DL (ref 6.3–8.2)
PROT UR QL STRIP: ABNORMAL
RBC # BLD AUTO: 4.46 10*6/MM3 (ref 4.2–5.4)
RBC # UR: ABNORMAL /HPF
REF LAB TEST METHOD: ABNORMAL
SODIUM BLD-SCNC: 135 MMOL/L (ref 137–145)
SP GR UR STRIP: 1.01 (ref 1–1.03)
SQUAMOUS #/AREA URNS HPF: ABNORMAL /HPF
TROPONIN I SERPL-MCNC: 0.03 NG/ML (ref 0–0.03)
UROBILINOGEN UR QL STRIP: ABNORMAL
WBC NRBC COR # BLD: 7.93 10*3/MM3 (ref 4.8–10.8)
WBC UR QL AUTO: ABNORMAL /HPF

## 2017-03-03 PROCEDURE — 84484 ASSAY OF TROPONIN QUANT: CPT | Performed by: PHYSICIAN ASSISTANT

## 2017-03-03 PROCEDURE — 80053 COMPREHEN METABOLIC PANEL: CPT | Performed by: PHYSICIAN ASSISTANT

## 2017-03-03 PROCEDURE — 99284 EMERGENCY DEPT VISIT MOD MDM: CPT

## 2017-03-03 PROCEDURE — 81001 URINALYSIS AUTO W/SCOPE: CPT | Performed by: PHYSICIAN ASSISTANT

## 2017-03-03 PROCEDURE — 93005 ELECTROCARDIOGRAM TRACING: CPT | Performed by: PHYSICIAN ASSISTANT

## 2017-03-03 PROCEDURE — 71020 HC CHEST PA AND LATERAL: CPT

## 2017-03-03 PROCEDURE — 25010000002 ONDANSETRON PER 1 MG: Performed by: PHYSICIAN ASSISTANT

## 2017-03-03 PROCEDURE — 96374 THER/PROPH/DIAG INJ IV PUSH: CPT

## 2017-03-03 PROCEDURE — 96361 HYDRATE IV INFUSION ADD-ON: CPT

## 2017-03-03 PROCEDURE — 87086 URINE CULTURE/COLONY COUNT: CPT | Performed by: PHYSICIAN ASSISTANT

## 2017-03-03 PROCEDURE — 83690 ASSAY OF LIPASE: CPT | Performed by: PHYSICIAN ASSISTANT

## 2017-03-03 PROCEDURE — 85025 COMPLETE CBC W/AUTO DIFF WBC: CPT | Performed by: PHYSICIAN ASSISTANT

## 2017-03-03 RX ORDER — ONDANSETRON 2 MG/ML
4 INJECTION INTRAMUSCULAR; INTRAVENOUS ONCE
Status: COMPLETED | OUTPATIENT
Start: 2017-03-03 | End: 2017-03-03

## 2017-03-03 RX ORDER — ONDANSETRON 4 MG/1
4 TABLET, ORALLY DISINTEGRATING ORAL EVERY 6 HOURS PRN
Qty: 12 TABLET | Refills: 0 | Status: SHIPPED | OUTPATIENT
Start: 2017-03-03 | End: 2017-04-10

## 2017-03-03 RX ORDER — SODIUM CHLORIDE 0.9 % (FLUSH) 0.9 %
10 SYRINGE (ML) INJECTION AS NEEDED
Status: DISCONTINUED | OUTPATIENT
Start: 2017-03-03 | End: 2017-03-03 | Stop reason: HOSPADM

## 2017-03-03 RX ORDER — CEPHALEXIN 500 MG/1
500 CAPSULE ORAL 2 TIMES DAILY
Qty: 14 CAPSULE | Refills: 0 | Status: SHIPPED | OUTPATIENT
Start: 2017-03-03 | End: 2017-04-10

## 2017-03-03 RX ADMIN — ONDANSETRON 4 MG: 2 INJECTION INTRAMUSCULAR; INTRAVENOUS at 16:56

## 2017-03-03 RX ADMIN — SODIUM CHLORIDE 1000 ML: 9 INJECTION, SOLUTION INTRAVENOUS at 16:46

## 2017-03-03 NOTE — ED PROVIDER NOTES
Subjective   HPI Comments: 76-year-old female here for an 8 day history of generalized weakness, no appetite, intermittent nausea and vomiting.  Vomited ×3 yesterday.  Nauseated today.  Also has a nonproductive cough and a mild headache.  No URI symptoms, sore throat or earache.  No diarrhea.  No urinary complaints.  No chest pain.  No abdominal pain.  No treatment prior to arrival.  Apparently her blood pressure medications got increased recently and she wonders if this has something to do with why her appetite is gone.  The mid-level provider that changed these medications around did not feel as though this was causing her anorexia/vomiting.    Patient is a 76 y.o. female presenting with weakness.   History provided by:  Patient  History limited by: nothing.   used: No    Weakness - Generalized   Severity:  Moderate  Onset quality:  Gradual  Duration:  8 days  Timing:  Constant  Progression:  Worsening  Chronicity:  New  Context: change in medication and recent infection    Relieved by:  Nothing  Worsened by:  Nothing  Ineffective treatments:  None tried  Associated symptoms: anorexia, cough, nausea, shortness of breath and vomiting    Associated symptoms: no abdominal pain, no chest pain, no diarrhea, no dysuria and no fever        Review of Systems   Constitutional: Negative for fever.   HENT: Negative.    Eyes: Negative.    Respiratory: Positive for cough and shortness of breath.    Cardiovascular: Negative.  Negative for chest pain.   Gastrointestinal: Positive for anorexia, nausea and vomiting. Negative for abdominal pain and diarrhea.   Endocrine: Negative.    Genitourinary: Negative for dysuria.   Musculoskeletal: Negative.    Skin: Negative.    Allergic/Immunologic: Negative.    Neurological: Positive for weakness.   Hematological: Negative.    Psychiatric/Behavioral: Negative.    All other systems reviewed and are negative.      Past Medical History   Diagnosis Date   • Abdominal pain       Last Impression: 26 May 2015  Improving. The patient states she had a history of lower   abdominal pain after taking antibiotics for UTI, but this has improved.  Sanjiv Mak (Gastroenterology)   • Abnormal weight gain    • Anxiety 12/12/2016   • Cataract    • Cataract 2014   • Coronary artery disease 12/12/2016     Field ST-elevation myocardial infarction, 11/25/2015, with associated chest pain, jaw pain, diaphoresis, and dyspnea; inferior-anterior ST elevation by outside telemetry strips.  Emergent cardiac catheterization, Dr. Isael Rosenbaum:  Status post drug-eluting stent to the proximal right coronary artery using a 2.25 x 23 mm Xience Alpine stent.  Ejection fraction 55% to 60%.    • Gout      2012   • Gout 2012   • H/O bone density study    • Headache    • Headache    • High cholesterol      1995   • Hyperparathyroidism      2009   • Hypertension    • Hypothyroidism    • Myocardial infarction    • Parathyroid disorder 12/12/2016     with a history of a left parathyroid parathyroidectomy.    • Peptic ulcer disease      and bleeding ulcer approximately 12 years ago; incomplete database.        Allergies   Allergen Reactions   • Crestor [Rosuvastatin Calcium] Myalgia   • Lipitor [Atorvastatin] Myalgia   • Pravachol [Pravastatin Sodium] Myalgia   • Statins    • Zocor [Simvastatin] Myalgia   • Aspirin Rash   • Bactrim [Sulfamethoxazole-Trimethoprim] Rash       Past Surgical History   Procedure Laterality Date   • Appendectomy     • Colonoscopy     • Parathyroidectomy Left    • Breast biopsy Right 1995     Ultrasound guided needle biopsy   • Hysterectomy Bilateral 1978   • Oophorectomy     • Other surgical history       Ulcer repair       Family History   Problem Relation Age of Onset   • Anemia Mother    • Arthritis Mother    • Heart disease Mother    • Hypertension Mother    • Stroke Mother    • Cancer Father      stomach   • Breast cancer Neg Hx    • Ovarian cancer Neg Hx    • Colon cancer Neg Hx    •  Endometrial cancer Neg Hx        Social History     Social History   • Marital status:      Spouse name: N/A   • Number of children: N/A   • Years of education: N/A     Social History Main Topics   • Smoking status: Never Smoker   • Smokeless tobacco: Never Used   • Alcohol use No   • Drug use: No   • Sexual activity: Defer     Other Topics Concern   • None     Social History Narrative   • None           Objective   Physical Exam   Constitutional: She is oriented to person, place, and time. She appears well-developed and well-nourished. No distress.   HENT:   Head: Normocephalic and atraumatic.   Right Ear: External ear normal.   Left Ear: External ear normal.   Eyes: EOM are normal. Pupils are equal, round, and reactive to light.   Neck: Normal range of motion. Neck supple.   Cardiovascular: Normal rate, regular rhythm and normal heart sounds.    Pulmonary/Chest: Effort normal and breath sounds normal. No stridor. She has no wheezes. She exhibits no tenderness.   Abdominal: Soft. She exhibits no distension. There is no tenderness. There is no rebound and no guarding.   Musculoskeletal: Normal range of motion. She exhibits no edema.   Neurological: She is alert and oriented to person, place, and time.   Skin: Skin is warm and dry. No rash noted. She is not diaphoretic.   Psychiatric: She has a normal mood and affect. Her behavior is normal. Judgment and thought content normal.   Nursing note and vitals reviewed.      ECG 12 Lead    Date/Time: 3/3/2017 5:51 PM  Performed by: ADALBERTO MC  Authorized by: ADALBERTO MC   Rhythm: paced  Comments: EKG: Sinus rhythm at a rate of 77.  Poor anterior R-wave progression.  No ischemia or infarction.  Normal axis and intervals.  Nonspecific ST and T-wave changes.                 ED Course  ED Course   Comment By Time   The patient feels much better after IV fluids.  Apparently her calcium was called back as critical.  She has a history of hyperparathyroidism.  Her  chlorthalidone dose was recently increased.  She does not know her previous dose.  We will have her call her cardiologist on Monday to adjust chlorthalidone dose downwards.  This medication can contribute to the hypercalcemia and vomiting. Jeannie Jarrett PA-C 03/03 1941                  MDM  Number of Diagnoses or Management Options  Dehydration: new and requires workup  Generalized weakness: new and requires workup  Hypercalcemia: new and requires workup  Urinary tract infection without hematuria, site unspecified: new and requires workup     Amount and/or Complexity of Data Reviewed  Clinical lab tests: ordered and reviewed  Tests in the radiology section of CPT®: ordered and reviewed  Tests in the medicine section of CPT®: ordered and reviewed  Discuss the patient with other providers: yes  Independent visualization of images, tracings, or specimens: yes    Risk of Complications, Morbidity, and/or Mortality  Presenting problems: moderate  Diagnostic procedures: minimal  Management options: low    Patient Progress  Patient progress: stable      Final diagnoses:   Generalized weakness   Hypercalcemia   Urinary tract infection without hematuria, site unspecified   Dehydration            Jeannie Jarrett PA-C  03/03/17 1948

## 2017-03-04 NOTE — DISCHARGE INSTRUCTIONS
You will need to go back to your previous chlorthalidone dose.      Return to the ER for chest pain, abdominal pain, intractable vomiting, excessive weakness, new or worsening symptoms.        Follow-up with your doctor on Monday for possible medication adjustment as we think the chlorthalidone may be contributing to your hypercalcemia(which can cause weakness and vomiting).

## 2017-03-05 LAB — BACTERIA SPEC AEROBE CULT: NO GROWTH

## 2017-03-06 ENCOUNTER — TELEPHONE (OUTPATIENT)
Dept: CARDIOLOGY | Facility: CLINIC | Age: 77
End: 2017-03-06

## 2017-03-09 DIAGNOSIS — I10 ESSENTIAL HYPERTENSION: ICD-10-CM

## 2017-03-09 RX ORDER — METOPROLOL SUCCINATE 25 MG/1
25 TABLET, EXTENDED RELEASE ORAL DAILY
Qty: 30 TABLET | Refills: 6 | Status: SHIPPED | OUTPATIENT
Start: 2017-03-09 | End: 2017-05-15 | Stop reason: SDUPTHER

## 2017-04-10 ENCOUNTER — OFFICE VISIT (OUTPATIENT)
Dept: CARDIOLOGY | Facility: CLINIC | Age: 77
End: 2017-04-10

## 2017-04-10 VITALS
WEIGHT: 150 LBS | BODY MASS INDEX: 27.6 KG/M2 | SYSTOLIC BLOOD PRESSURE: 134 MMHG | DIASTOLIC BLOOD PRESSURE: 80 MMHG | HEART RATE: 67 BPM | HEIGHT: 62 IN

## 2017-04-10 DIAGNOSIS — E78.2 MIXED HYPERLIPIDEMIA: ICD-10-CM

## 2017-04-10 DIAGNOSIS — I25.10 CORONARY ARTERY DISEASE INVOLVING NATIVE CORONARY ARTERY OF NATIVE HEART WITHOUT ANGINA PECTORIS: ICD-10-CM

## 2017-04-10 DIAGNOSIS — I10 ESSENTIAL HYPERTENSION: Primary | ICD-10-CM

## 2017-04-10 PROCEDURE — 99213 OFFICE O/P EST LOW 20 MIN: CPT | Performed by: PHYSICIAN ASSISTANT

## 2017-04-10 RX ORDER — NITROGLYCERIN 0.4 MG/1
0.4 TABLET SUBLINGUAL
COMMUNITY
End: 2019-09-13 | Stop reason: SDUPTHER

## 2017-04-10 RX ORDER — NIFEDIPINE 60 MG/1
60 TABLET, EXTENDED RELEASE ORAL DAILY
Qty: 30 TABLET | Refills: 11 | Status: SHIPPED | OUTPATIENT
Start: 2017-04-10 | End: 2018-03-31 | Stop reason: SDUPTHER

## 2017-04-10 NOTE — PROGRESS NOTES
[facility] at [facility] - Office Note  Nicolle Chavez         [address] [address]  1940   [phone] (home)      LOCATION:  [facility] [place] office.  Visit Type: Follow Up.    PCP:  Christelle Gamez,     04/10/17   Nicolle Chavez is a 76 y.o.  female  retired.      Chief Complaint: Follow up on HTN.  PROBLEM LIST:   1. Coronary artery disease:  a. Field ST-elevation myocardial infarction, 11/25/2015, with associated chest pain, jaw pain, diaphoresis, and dyspnea; inferior-anterior ST elevation by outside telemetry strips.   b. Emergent cardiac catheterization, Dr. Isael Rosenbaum: Status post drug-eluting stent to the proximal right coronary artery using a 2.25 x 23 mm Xience Alpine stent.   c. Ejection fraction 55% to 60%.   2. Uncontrolled Essential HTN.  3. Dyspnea.   4. Dyslipidemia.   5. Hypothyroidism, on chronic supplementation.   6. 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   7. Lower extremity edema.   8. History of peptic ulcer disease and bleeding ulcer approximately 12 years ago; incomplete database.   9. Family history of coronary disease.   10. Anxiety.   11. Gout.   12. Surgical history:  a. Hysterectomy.   b. Left parathyroidectomy.  c. Ulcer repair.                  Allergies   Allergen Reactions   • Crestor [Rosuvastatin Calcium] Myalgia   • Lipitor [Atorvastatin] Myalgia   • Pravachol [Pravastatin Sodium] Myalgia   • Statins    • Zocor [Simvastatin] Myalgia   • Aspirin Rash   • Bactrim [Sulfamethoxazole-Trimethoprim] Rash         Current Outpatient Prescriptions:   •  aspirin 81 MG tablet, Take 1 tablet by mouth daily., Disp: , Rfl:   •  Azilsartan Medoxomil (EDARBI) 40 MG tablet, Take 40 mg by mouth Daily., Disp: 30 tablet, Rfl: 6  •  cinacalcet (SENSIPAR) 60 MG tablet, Take 1 tablet by mouth daily., Disp: , Rfl:   •  clopidogrel (PLAVIX) 75 MG tablet, TAKE 1 TABLET BY MOUTH ONE TIME A DAY , Disp: 30  tablet, Rfl: 10  •  denosumab (PROLIA) 60 MG/ML solution syringe, Inject 60 mg under the skin 1 (One) Time. Yearly, Disp: , Rfl:   •  Krill Oil 300 MG capsule, Take 1 capsule by mouth daily., Disp: , Rfl:   •  levothyroxine (SYNTHROID, LEVOTHROID) 88 MCG tablet, TAKE 1 TABLET BY MOUTH ONE TIME A DAY , Disp: 90 tablet, Rfl: 1  •  magnesium oxide (MAG-OX) 400 MG tablet, Take 400 mg by mouth daily., Disp: , Rfl:   •  metoprolol succinate XL (TOPROL-XL) 25 MG 24 hr tablet, Take 1 tablet by mouth Daily., Disp: 30 tablet, Rfl: 6  •  Multiple Vitamins-Minerals (OCUVITE PO), Take 1 tablet by mouth Daily., Disp: , Rfl:   •  nitroglycerin (NITROSTAT) 0.4 MG SL tablet, Place 0.4 mg under the tongue Every 5 (Five) Minutes As Needed for Chest Pain. Take no more than 3 doses in 15 minutes., Disp: , Rfl:   •  pitavastatin calcium (LIVALO) 2 MG tablet tablet, Take 1 tablet by mouth every night. (Patient taking differently: Take 2 mg by mouth Every Other Day. At night.), Disp: 30 tablet, Rfl: 2    HPI  Nicolle is here today with her  for an early follow-up on her hypertension.  Over the last few appointments, we've been making several adjustments to her medications for better blood pressure control.  She brings with her today a blood pressure log over the last 6 weeks.  Overall she's been fairly controlled but is concerned about the elevated readings in the 170s systolically.  She states at these times, she gets profound headaches and pressure in her temporal region.  She has no stroke or tear like symptoms, no dizziness or neurologic changes.    The following portions of the patient's history were reviewed in the chart and updated as appropriate: allergies, current medications, past family history, past medical history, past social history, past surgical history and problem list.    Review of Systems   Eyes: Negative for blurred vision.   Cardiovascular: Positive for leg swelling. Negative for dyspnea on exertion.     "         height is 62\" (157.5 cm) and weight is 150 lb (68 kg). Her blood pressure is 134/80 and her pulse is 67.   Physical Exam   Constitutional: Vital signs are normal. She appears well-developed and well-nourished.   Cardiovascular: Normal rate, regular rhythm, S1 normal, S2 normal, normal heart sounds, intact distal pulses and normal pulses.    Pulmonary/Chest: Effort normal and breath sounds normal. She has no wheezes. She has no rhonchi. She has no rales.   Abdominal: Soft. Normal appearance and bowel sounds are normal. There is no hepatosplenomegaly.   Neurological: She is alert.         Procedures     Assessment/ Plan     Essential hypertension:  I reviewed her blood pressure log.  She has some low blood pressure readings and some high readings.  Because of her symptoms with headaches with what appeared to be possible migraines, i'm going to add Nifedipine 60 mg po daily.  Monitor swelling. Monitor SBP levels.  RTC 6 weeks for bp review.      Radha Graves PA-C  4/10/2017 2:36 PM      EMR Dragon/Transcription disclaimer:   Much of this encounter note is an electronic transcription/translation of spoken language to printed text. The electronic translation of spoken language may permit erroneous, or at times, nonsensical words or phrases to be inadvertently transcribed; Although I have reviewed the note for such errors, some may still exist.      "

## 2017-05-03 RX ORDER — PITAVASTATIN CALCIUM 2.09 MG/1
TABLET, FILM COATED ORAL
Qty: 30 TABLET | Refills: 1 | Status: SHIPPED | OUTPATIENT
Start: 2017-05-03 | End: 2017-07-10 | Stop reason: SDUPTHER

## 2017-05-15 ENCOUNTER — OFFICE VISIT (OUTPATIENT)
Dept: CARDIOLOGY | Facility: CLINIC | Age: 77
End: 2017-05-15

## 2017-05-15 VITALS
HEIGHT: 62 IN | DIASTOLIC BLOOD PRESSURE: 78 MMHG | HEART RATE: 74 BPM | BODY MASS INDEX: 28.26 KG/M2 | WEIGHT: 153.6 LBS | SYSTOLIC BLOOD PRESSURE: 140 MMHG

## 2017-05-15 DIAGNOSIS — E78.2 MIXED HYPERLIPIDEMIA: ICD-10-CM

## 2017-05-15 DIAGNOSIS — I25.10 CORONARY ARTERY DISEASE INVOLVING NATIVE CORONARY ARTERY OF NATIVE HEART WITHOUT ANGINA PECTORIS: ICD-10-CM

## 2017-05-15 DIAGNOSIS — I10 ESSENTIAL HYPERTENSION: Primary | ICD-10-CM

## 2017-05-15 PROCEDURE — 99213 OFFICE O/P EST LOW 20 MIN: CPT | Performed by: PHYSICIAN ASSISTANT

## 2017-05-15 RX ORDER — METOPROLOL SUCCINATE 25 MG/1
25 TABLET, EXTENDED RELEASE ORAL 2 TIMES DAILY
Qty: 60 TABLET | Refills: 6 | Status: SHIPPED | OUTPATIENT
Start: 2017-05-15 | End: 2018-05-21 | Stop reason: SDUPTHER

## 2017-06-01 ENCOUNTER — TRANSCRIBE ORDERS (OUTPATIENT)
Dept: ADMINISTRATIVE | Facility: HOSPITAL | Age: 77
End: 2017-06-01

## 2017-06-01 DIAGNOSIS — Z12.31 VISIT FOR SCREENING MAMMOGRAM: Primary | ICD-10-CM

## 2017-06-15 RX ORDER — LEVOTHYROXINE SODIUM 88 UG/1
TABLET ORAL
Qty: 90 TABLET | Refills: 0 | OUTPATIENT
Start: 2017-06-15

## 2017-07-05 ENCOUNTER — HOSPITAL ENCOUNTER (OUTPATIENT)
Dept: MAMMOGRAPHY | Facility: HOSPITAL | Age: 77
Discharge: HOME | End: 2017-07-05
Attending: INTERNAL MEDICINE | Admitting: INTERNAL MEDICINE

## 2017-07-05 DIAGNOSIS — Z12.31 VISIT FOR SCREENING MAMMOGRAM: ICD-10-CM

## 2017-07-05 PROCEDURE — G0202 SCR MAMMO BI INCL CAD: HCPCS | Performed by: RADIOLOGY

## 2017-07-05 PROCEDURE — G0202 SCR MAMMO BI INCL CAD: HCPCS

## 2017-07-05 PROCEDURE — 77063 BREAST TOMOSYNTHESIS BI: CPT

## 2017-07-05 PROCEDURE — 77063 BREAST TOMOSYNTHESIS BI: CPT | Performed by: RADIOLOGY

## 2017-07-05 RX ORDER — PITAVASTATIN CALCIUM 2.09 MG/1
TABLET, FILM COATED ORAL
Qty: 30 TABLET | Refills: 0 | OUTPATIENT
Start: 2017-07-05

## 2017-07-07 ENCOUNTER — TELEPHONE (OUTPATIENT)
Dept: INTERNAL MEDICINE | Facility: CLINIC | Age: 77
End: 2017-07-07

## 2017-07-10 ENCOUNTER — TELEPHONE (OUTPATIENT)
Dept: INTERNAL MEDICINE | Facility: CLINIC | Age: 77
End: 2017-07-10

## 2017-07-10 RX ORDER — LEVOTHYROXINE SODIUM 88 UG/1
88 TABLET ORAL DAILY
Qty: 19 TABLET | Refills: 0 | Status: SHIPPED | OUTPATIENT
Start: 2017-07-10 | End: 2017-07-25 | Stop reason: SDUPTHER

## 2017-07-10 NOTE — TELEPHONE ENCOUNTER
----- Message from Christelle Gamez DO sent at 7/10/2017  4:02 PM EDT -----  Regarding: FW: Prescription Question  Contact: [phone]  She needs an offcie visit. Can do 19 day supply on each  ----- Message -----     From: Nicolle Chavez     Sent: 7/8/2017  10:24 AM       To: Christelle Gamez DO  Subject: Prescription Question                            I have been notified that renewal of my prescriptions for livalo and levothyroxine have been denied.

## 2017-07-25 ENCOUNTER — OFFICE VISIT (OUTPATIENT)
Dept: INTERNAL MEDICINE | Facility: CLINIC | Age: 77
End: 2017-07-25

## 2017-07-25 VITALS
BODY MASS INDEX: 27.87 KG/M2 | WEIGHT: 152.4 LBS | DIASTOLIC BLOOD PRESSURE: 80 MMHG | SYSTOLIC BLOOD PRESSURE: 140 MMHG | HEART RATE: 64 BPM | OXYGEN SATURATION: 99 %

## 2017-07-25 DIAGNOSIS — Z00.00 MEDICARE ANNUAL WELLNESS VISIT, SUBSEQUENT: Primary | ICD-10-CM

## 2017-07-25 DIAGNOSIS — N81.11 MIDLINE CYSTOCELE: ICD-10-CM

## 2017-07-25 DIAGNOSIS — Z12.11 COLON CANCER SCREENING: ICD-10-CM

## 2017-07-25 LAB
ALBUMIN SERPL-MCNC: 4.6 G/DL (ref 3.2–4.8)
ALBUMIN/GLOB SERPL: 2.2 G/DL (ref 1.5–2.5)
ALP SERPL-CCNC: 107 U/L (ref 25–100)
ALT SERPL W P-5'-P-CCNC: 19 U/L (ref 7–40)
ANION GAP SERPL CALCULATED.3IONS-SCNC: 7 MMOL/L (ref 3–11)
ARTICHOKE IGE QN: 140 MG/DL (ref 0–130)
AST SERPL-CCNC: 19 U/L (ref 0–33)
BASOPHILS # BLD AUTO: 0.03 10*3/MM3 (ref 0–0.2)
BASOPHILS NFR BLD AUTO: 0.6 % (ref 0–1)
BILIRUB SERPL-MCNC: 0.6 MG/DL (ref 0.3–1.2)
BUN BLD-MCNC: 18 MG/DL (ref 9–23)
BUN/CREAT SERPL: 20 (ref 7–25)
CALCIUM SPEC-SCNC: 9.6 MG/DL (ref 8.7–10.4)
CHLORIDE SERPL-SCNC: 104 MMOL/L (ref 99–109)
CHOLEST SERPL-MCNC: 229 MG/DL (ref 0–200)
CO2 SERPL-SCNC: 28 MMOL/L (ref 20–31)
CREAT BLD-MCNC: 0.9 MG/DL (ref 0.6–1.3)
DEPRECATED RDW RBC AUTO: 45.9 FL (ref 37–54)
EOSINOPHIL # BLD AUTO: 0.15 10*3/MM3 (ref 0–0.3)
EOSINOPHIL NFR BLD AUTO: 3 % (ref 0–3)
ERYTHROCYTE [DISTWIDTH] IN BLOOD BY AUTOMATED COUNT: 13.3 % (ref 11.3–14.5)
GFR SERPL CREATININE-BSD FRML MDRD: 61 ML/MIN/1.73
GLOBULIN UR ELPH-MCNC: 2.1 GM/DL
GLUCOSE BLD-MCNC: 95 MG/DL (ref 70–100)
HCT VFR BLD AUTO: 43.3 % (ref 34.5–44)
HDLC SERPL-MCNC: 61 MG/DL (ref 40–60)
HGB BLD-MCNC: 13.8 G/DL (ref 11.5–15.5)
IMM GRANULOCYTES # BLD: 0.01 10*3/MM3 (ref 0–0.03)
IMM GRANULOCYTES NFR BLD: 0.2 % (ref 0–0.6)
LYMPHOCYTES # BLD AUTO: 1.58 10*3/MM3 (ref 0.6–4.8)
LYMPHOCYTES NFR BLD AUTO: 31.7 % (ref 24–44)
MCH RBC QN AUTO: 30 PG (ref 27–31)
MCHC RBC AUTO-ENTMCNC: 31.9 G/DL (ref 32–36)
MCV RBC AUTO: 94.1 FL (ref 80–99)
MONOCYTES # BLD AUTO: 0.64 10*3/MM3 (ref 0–1)
MONOCYTES NFR BLD AUTO: 12.8 % (ref 0–12)
NEUTROPHILS # BLD AUTO: 2.58 10*3/MM3 (ref 1.5–8.3)
NEUTROPHILS NFR BLD AUTO: 51.7 % (ref 41–71)
PLATELET # BLD AUTO: 171 10*3/MM3 (ref 150–450)
PMV BLD AUTO: 10.4 FL (ref 6–12)
POTASSIUM BLD-SCNC: 4.6 MMOL/L (ref 3.5–5.5)
PROT SERPL-MCNC: 6.7 G/DL (ref 5.7–8.2)
RBC # BLD AUTO: 4.6 10*6/MM3 (ref 3.89–5.14)
SODIUM BLD-SCNC: 139 MMOL/L (ref 132–146)
TRIGL SERPL-MCNC: 208 MG/DL (ref 0–150)
TSH SERPL DL<=0.05 MIU/L-ACNC: 1.7 MIU/ML (ref 0.35–5.35)
WBC NRBC COR # BLD: 4.99 10*3/MM3 (ref 3.5–10.8)

## 2017-07-25 PROCEDURE — G0009 ADMIN PNEUMOCOCCAL VACCINE: HCPCS | Performed by: INTERNAL MEDICINE

## 2017-07-25 PROCEDURE — 90670 PCV13 VACCINE IM: CPT | Performed by: INTERNAL MEDICINE

## 2017-07-25 PROCEDURE — 85025 COMPLETE CBC W/AUTO DIFF WBC: CPT | Performed by: INTERNAL MEDICINE

## 2017-07-25 PROCEDURE — 84443 ASSAY THYROID STIM HORMONE: CPT | Performed by: INTERNAL MEDICINE

## 2017-07-25 PROCEDURE — G0444 DEPRESSION SCREEN ANNUAL: HCPCS | Performed by: INTERNAL MEDICINE

## 2017-07-25 PROCEDURE — G0439 PPPS, SUBSEQ VISIT: HCPCS | Performed by: INTERNAL MEDICINE

## 2017-07-25 PROCEDURE — 80061 LIPID PANEL: CPT | Performed by: INTERNAL MEDICINE

## 2017-07-25 PROCEDURE — 80053 COMPREHEN METABOLIC PANEL: CPT | Performed by: INTERNAL MEDICINE

## 2017-07-25 RX ORDER — LEVOTHYROXINE SODIUM 88 UG/1
88 TABLET ORAL DAILY
Qty: 90 TABLET | Refills: 1 | Status: SHIPPED | OUTPATIENT
Start: 2017-07-25 | End: 2018-02-01 | Stop reason: SDUPTHER

## 2017-07-25 NOTE — PROGRESS NOTES
QUICK REFERENCE INFORMATION:  The ABCs of the Annual Wellness Visit    Subsequent Medicare Wellness Visit    HEALTH RISK ASSESSMENT    1940    Recent Hospitalizations:  No hospitalization(s) within the last year..        Current Medical Providers:  Patient Care Team:  Christelle Gamez DO as PCP - General  Christelle Gamez DO as PCP - Family Medicine        Smoking Status:  History   Smoking Status   • Never Smoker   Smokeless Tobacco   • Never Used       Alcohol Consumption:  History   Alcohol Use No       Depression Screen:   PHQ-9 Depression Screening 7/25/2017   Little interest or pleasure in doing things 0   Feeling down, depressed, or hopeless 0   PHQ-9 Total Score 0       Health Habits and Functional and Cognitive Screening:  Functional & Cognitive Status 7/25/2017   Do you have difficulty preparing food and eating? Yes   Do you have difficulty bathing yourself? No   Do you have difficulty getting dressed? No   Do you have difficulty using the toilet? No   Do you have difficulty moving around from place to place? No   In the past year have you fallen or experienced a near fall? No   Do you need help using the phone?  No   Are you deaf or do you have serious difficulty hearing?  No   Do you need help with transportation? No   Do you need help shopping? No   Do you need help preparing meals?  No   Do you need help with housework?  No   Do you need help with laundry? No   Do you need help taking your medications? No   Do you need help managing money? No       Health Habits  Current Diet: Well Balanced Diet  Dental Exam: Up to date  Eye Exam: Up to date  Exercise (times per week): 7 times per week  Current Exercise Activities Include: Yard Work      Does the patient have evidence of cognitive impairment? Yes    Aspirin use counseling: Taking ASA appropriately as indicated      Recent Lab Results:  CMP:  Lab Results   Component Value Date     (H) 04/15/2016    BUN 34 (H) 03/03/2017    CREATININE  1.60 (H) 03/03/2017    EGFRIFNONA 31 (L) 03/03/2017    BCR 21.3 03/03/2017     (L) 03/03/2017    K 3.5 03/03/2017    CO2 35.0 (H) 03/03/2017    CALCIUM 12.5 (C) 03/03/2017    ALBUMIN 4.50 03/03/2017    LABIL2 1.4 03/03/2017    BILITOT 1.0 03/03/2017    ALKPHOS 89 03/03/2017    AST 23 03/03/2017    ALT 24 03/03/2017     Lipid Panel:  Lab Results   Component Value Date    TRIG 209 (H) 05/26/2016    HDL 54 05/26/2016    LDLDIRECT 112 05/26/2016     HbA1c:  Lab Results   Component Value Date    HGBA1C 5.2 01/21/2016       Visual Acuity:  No exam data present    Age-appropriate Screening Schedule:  Refer to the list below for future screening recommendations based on patient's age, sex and/or medical conditions. Orders for these recommended tests are listed in the plan section. The patient has been provided with a written plan.    Health Maintenance   Topic Date Due   • INFLUENZA VACCINE  08/01/2017   • PNEUMOCOCCAL VACCINES (65+ LOW/MEDIUM RISK) (2 of 2 - PPSV23) 07/25/2018   • LIPID PANEL  07/25/2018   • MAMMOGRAM  07/05/2019   • TDAP/TD VACCINES (2 - Td) 07/25/2027   • ZOSTER VACCINE  Addressed        Subjective   History of Present Illness    Nicolle Chavez is a 76 y.o. female who presents for an Subsequent Wellness Visit.    The following portions of the patient's history were reviewed and updated as appropriate: allergies, current medications, past family history, past medical history, past social history, past surgical history and problem list.    Outpatient Medications Prior to Visit   Medication Sig Dispense Refill   • aspirin 81 MG tablet Take 1 tablet by mouth daily.     • Azilsartan Medoxomil (EDARBI) 40 MG tablet Take 40 mg by mouth Daily. 30 tablet 6   • cinacalcet (SENSIPAR) 60 MG tablet Take 1 tablet by mouth daily.     • clopidogrel (PLAVIX) 75 MG tablet TAKE 1 TABLET BY MOUTH ONE TIME A DAY  30 tablet 10   • denosumab (PROLIA) 60 MG/ML solution syringe Inject 60 mg under the skin 1 (One)  Time. Yearly     • Krill Oil 300 MG capsule Take 1 capsule by mouth daily.     • magnesium oxide (MAG-OX) 400 MG tablet Take 400 mg by mouth daily.     • metoprolol succinate XL (TOPROL-XL) 25 MG 24 hr tablet Take 1 tablet by mouth 2 (Two) Times a Day. 60 tablet 6   • Multiple Vitamins-Minerals (OCUVITE PO) Take 1 tablet by mouth Daily.     • NIFEdipine XL (PROCARDIA XL) 60 MG 24 hr tablet Take 1 tablet by mouth Daily. 30 tablet 11   • nitroglycerin (NITROSTAT) 0.4 MG SL tablet Place 0.4 mg under the tongue Every 5 (Five) Minutes As Needed for Chest Pain. Take no more than 3 doses in 15 minutes.     • levothyroxine (SYNTHROID, LEVOTHROID) 88 MCG tablet Take 1 tablet by mouth Daily. 19 tablet 0   • pitavastatin calcium (LIVALO) 2 MG tablet tablet Take 1 tablet by mouth Daily. 19 tablet 0     No facility-administered medications prior to visit.        Patient Active Problem List   Diagnosis   • Gastroesophageal reflux disease   • Chronic kidney disease   • Colon polyp   • Hyperlipidemia   • Hypertension   • Hypothyroidism   • Dysphagia   • Myalgia and myositis   • Urinary incontinence   • Coronary artery disease   • Parathyroid disorder   • Anxiety   • Gout       Advance Care Planning:  has an advance directive - a copy HAS NOT been provided. Have asked the patient to send this to us to add to record.    Identification of Risk Factors:  Risk factors include: weight , cardiovascular risk and incontinence.    Review of Systems   Constitutional: Negative for activity change, appetite change, chills, diaphoresis, fatigue, fever and unexpected weight change.   HENT: Negative for congestion, ear discharge, ear pain, mouth sores, nosebleeds, sinus pressure, sneezing and sore throat.    Eyes: Negative for pain, discharge and itching.   Respiratory: Negative for cough, chest tightness, shortness of breath and wheezing.    Cardiovascular: Negative for chest pain, palpitations and leg swelling.   Gastrointestinal: Negative for  abdominal pain, constipation, diarrhea, nausea and vomiting.   Endocrine: Negative for cold intolerance, heat intolerance, polydipsia and polyphagia.   Genitourinary: Negative for dysuria, flank pain, frequency, hematuria and urgency.        Bulge where bladder is   Musculoskeletal: Negative for arthralgias, back pain, gait problem, myalgias, neck pain and neck stiffness.   Skin: Negative for color change, pallor and rash.   Neurological: Negative for seizures, speech difficulty, numbness and headaches.   Psychiatric/Behavioral: Negative for agitation, confusion, decreased concentration and sleep disturbance. The patient is not nervous/anxious.        Compared to one year ago, the patient feels her physical health is the same.  Compared to one year ago, the patient feels her mental health is the same.    Objective     Physical Exam    Vitals:    07/25/17 1130   BP: 140/80   Pulse: 64   SpO2: 99%   Weight: 152 lb 6.4 oz (69.1 kg)       Body mass index is 27.87 kg/(m^2).  Discussed the patient's BMI with her. The BMI is above average; BMI management plan is completed.    Assessment/Plan   Patient Self-Management and Personalized Health Advice  The patient has been provided with information about: diet, exercise, weight management and fall prevention and preventive services including:   · Advance directive, Colorectal cancer screening, colonoscopy referral placed, Exercise counseling provided, Fall Risk assessment done, Fall Risk plan of care done.    Visit Diagnoses:    ICD-10-CM ICD-9-CM   1. Medicare annual wellness visit, subsequent Z00.00 V70.0   2. Midline cystocele N81.11 618.01   3. Colon cancer screening Z12.11 V76.51     2006 last colonoscopy  Orders Placed This Encounter   Procedures   • Ambulatory Referral For Screening Colonoscopy     Referral Priority:   Routine     Referral Type:   Diagnostic Medical     Referral Reason:   Specialty Services Required     Number of Visits Requested:   1       Outpatient  Encounter Prescriptions as of 7/25/2017   Medication Sig Dispense Refill   • aspirin 81 MG tablet Take 1 tablet by mouth daily.     • Azilsartan Medoxomil (EDARBI) 40 MG tablet Take 40 mg by mouth Daily. 30 tablet 6   • cinacalcet (SENSIPAR) 60 MG tablet Take 1 tablet by mouth daily.     • clopidogrel (PLAVIX) 75 MG tablet TAKE 1 TABLET BY MOUTH ONE TIME A DAY  30 tablet 10   • denosumab (PROLIA) 60 MG/ML solution syringe Inject 60 mg under the skin 1 (One) Time. Yearly     • Krill Oil 300 MG capsule Take 1 capsule by mouth daily.     • levothyroxine (SYNTHROID, LEVOTHROID) 88 MCG tablet Take 1 tablet by mouth Daily. 90 tablet 1   • magnesium oxide (MAG-OX) 400 MG tablet Take 400 mg by mouth daily.     • metoprolol succinate XL (TOPROL-XL) 25 MG 24 hr tablet Take 1 tablet by mouth 2 (Two) Times a Day. 60 tablet 6   • Multiple Vitamins-Minerals (OCUVITE PO) Take 1 tablet by mouth Daily.     • NIFEdipine XL (PROCARDIA XL) 60 MG 24 hr tablet Take 1 tablet by mouth Daily. 30 tablet 11   • nitroglycerin (NITROSTAT) 0.4 MG SL tablet Place 0.4 mg under the tongue Every 5 (Five) Minutes As Needed for Chest Pain. Take no more than 3 doses in 15 minutes.     • pitavastatin calcium (LIVALO) 2 MG tablet tablet Take 1 tablet by mouth Daily. 90 tablet 1   • [DISCONTINUED] levothyroxine (SYNTHROID, LEVOTHROID) 88 MCG tablet Take 1 tablet by mouth Daily. 19 tablet 0   • [DISCONTINUED] pitavastatin calcium (LIVALO) 2 MG tablet tablet Take 1 tablet by mouth Daily. 19 tablet 0     No facility-administered encounter medications on file as of 7/25/2017.        Reviewed use of high risk medication in the elderly: yes  Reviewed for potential of harmful drug interactions in the elderly: yes    Follow Up:  No Follow-up on file.     An After Visit Summary and PPPS with all of these plans were given to the patient.

## 2017-07-26 ENCOUNTER — TELEPHONE (OUTPATIENT)
Dept: CARDIOLOGY | Facility: CLINIC | Age: 77
End: 2017-07-26

## 2017-07-26 NOTE — TELEPHONE ENCOUNTER
Patient called and stated having a colonoscopy next week and wanted to know how long to be off asa 81 mg and plavix 75 mg every day. Please advise.

## 2017-07-27 NOTE — TELEPHONE ENCOUNTER
She needs to stay on her aspirin.  She can hold her Plavix 5 days prior to and resume afterwards.  Radha Graves, PA

## 2017-07-27 NOTE — TELEPHONE ENCOUNTER
Called patient and told her that she needs to stay on her aspirin. She can hold her plavix 5 days prior to the colonoscopy  and resume afterwards. Verbalized understanding.

## 2017-08-02 ENCOUNTER — TELEPHONE (OUTPATIENT)
Dept: INTERNAL MEDICINE | Facility: CLINIC | Age: 77
End: 2017-08-02

## 2017-08-02 NOTE — TELEPHONE ENCOUNTER
MS CHRISTINE CALLED TO FOLLOW UP ON A REFERRAL FOR UROLOGY THAT SHE HAD DISCUSSED WITH DR GOLD DURING HER LAST VISIT. HER [facility] OFFICE VISIT WAS ON 7/25.    CALL BACK #[phone]

## 2017-08-07 DIAGNOSIS — N81.11 MIDLINE CYSTOCELE: Primary | ICD-10-CM

## 2017-08-22 ENCOUNTER — TELEPHONE (OUTPATIENT)
Dept: ENDOCRINOLOGY | Facility: CLINIC | Age: 77
End: 2017-08-22

## 2017-09-25 ENCOUNTER — OFFICE VISIT (OUTPATIENT)
Dept: CARDIOLOGY | Facility: CLINIC | Age: 77
End: 2017-09-25

## 2017-09-25 VITALS
SYSTOLIC BLOOD PRESSURE: 147 MMHG | HEART RATE: 73 BPM | HEIGHT: 62 IN | BODY MASS INDEX: 27.6 KG/M2 | DIASTOLIC BLOOD PRESSURE: 75 MMHG | WEIGHT: 150 LBS

## 2017-09-25 DIAGNOSIS — I25.10 CORONARY ARTERY DISEASE INVOLVING NATIVE CORONARY ARTERY OF NATIVE HEART WITHOUT ANGINA PECTORIS: ICD-10-CM

## 2017-09-25 DIAGNOSIS — E78.2 MIXED HYPERLIPIDEMIA: Primary | ICD-10-CM

## 2017-09-25 DIAGNOSIS — R00.2 PALPITATIONS: ICD-10-CM

## 2017-09-25 DIAGNOSIS — I10 ESSENTIAL HYPERTENSION: ICD-10-CM

## 2017-09-25 PROCEDURE — 93000 ELECTROCARDIOGRAM COMPLETE: CPT | Performed by: INTERNAL MEDICINE

## 2017-09-25 PROCEDURE — 99214 OFFICE O/P EST MOD 30 MIN: CPT | Performed by: INTERNAL MEDICINE

## 2017-09-25 NOTE — PROGRESS NOTES
[facility]  Office visit  Nicolle Chavez  1940  [phone]    VISIT DATE:  09/25/2017    PCP: Christelle Gamez, DO  [address]  [facility] [address]    CC:  Chief Complaint   Patient presents with   • Hypertension     follow up       PROBLEM LIST:  1. Coronary artery disease:  a. Field ST-elevation myocardial infarction, 11/25/2015, with associated chest pain, jaw pain, diaphoresis, and dyspnea; inferior-anterior ST elevation by outside telemetry strips.   b. Emergent cardiac catheterization, Dr. Isael Rosenbaum: Status post drug-eluting stent to the proximal right coronary artery using a 2.25 x 23 mm Xience Alpine stent.   c. Ejection fraction 55% to 60%.   2. Uncontrolled Essential HTN.  3. Dyspnea.   4. Mixed Dyslipidemia.   5. Hypothyroidism, on chronic supplementation.   6. 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   7. Lower extremity edema.   8. History of peptic ulcer disease and bleeding ulcer approximately 12 years ago; incomplete database.   9. Family history of coronary disease.   10. Anxiety.   11. Gout.   12. Surgical history:  a. Hysterectomy.   b. Left parathyroidectomy.  c. Ulcer repair.           ASSESSMENT:   Diagnosis Plan   1. Mixed hyperlipidemia     2. Essential hypertension     3. Coronary artery disease involving native coronary artery of native heart without angina pectoris     4. Palpitations  ZIO Patch       PLAN:  Palpitations: 14 day event monitor for symptom rhythm correlation of palpitations.    Coronary artery disease: Stable.  Previous catheter negative when she developed fatigue and palpitations last year.  We will discontinue Plavix at follow-up.  Continue aspirin, beta blockade, statin.    Hyperlipidemia: Continue Livalo.    Hypertension: Stable well-controlled.  Continue current medical therapy    Subjective  Describes new onset palpitations over the previous 4 weeks.  Describes sudden  "onset racing heartbeat which can last for 10-15 minutes.  No obvious triggers.  No alleviating or exacerbating factors.  She can have some mild lightheadedness.  Resolves spontaneously.  First 2-3 times a week.  She's had some generalized fatigue over the same time.  Denies chest pain.  Blood pressures have been running less than 140/90 mmHg.  Denies easy bruising or bleeding, conditions on dual antiplatelet therapy.  No myalgias on current statin.    PHYSICAL EXAMINATION:  Vitals:    09/25/17 0959   BP: 147/75   BP Location: Left arm   Patient Position: Sitting   Pulse: 73   Weight: 150 lb (68 kg)   Height: 62\" (157.5 cm)     General Appearance:    Alert, cooperative, no distress, appears stated age   Head:    Normocephalic, without obvious abnormality, atraumatic   Eyes:    conjunctiva/corneas clear   Nose:   Nares normal, septum midline, mucosa normal, no drainage   Throat:   Lips, teeth and gums normal   Neck:   Supple, symmetrical, trachea midline, no carotid    bruit or JVD   Lungs:     Clear to auscultation bilaterally, respirations unlabored   Chest Wall:    No tenderness or deformity    Heart:    Regular rate and rhythm, S1 and S2 normal, no murmur, rub   or gallop, normal carotid impulse bilaterally without bruit.   Abdomen:     Soft, non-tender   Extremities:   Extremities normal, atraumatic, no cyanosis or edema   Pulses:   2+ and symmetric all extremities   Skin:   Skin color, texture, turgor normal, no rashes or lesions       Diagnostic Data:    ECG 12 Lead  Date/Time: 9/25/2017 10:13 AM  Performed by: KIMBERLY CUEVAS III  Authorized by: KIMBERLY CUEVAS III   Rhythm: sinus rhythm  Rate: normal  Conduction: conduction normal  ST Segments: ST segments normal  T Waves: T waves normal  QRS axis: normal  Clinical impression: normal ECG          Lab Results   Component Value Date    CHLPL 206 (H) 05/26/2016    TRIG 208 (H) 07/25/2017    HDL 61 (H) 07/25/2017    LDLDIRECT 140 (H) 07/25/2017     Lab Results "   Component Value Date    GLUCOSE 95 07/25/2017    BUN 18 07/25/2017    CREATININE 0.90 07/25/2017     07/25/2017    K 4.6 07/25/2017     07/25/2017    CO2 28.0 07/25/2017     Lab Results   Component Value Date    HGBA1C 5.2 01/21/2016     Lab Results   Component Value Date    WBC 4.99 07/25/2017    HGB 13.8 07/25/2017    HCT 43.3 07/25/2017     07/25/2017       Allergies  Allergies   Allergen Reactions   • Crestor [Rosuvastatin Calcium] Myalgia   • Lipitor [Atorvastatin] Myalgia   • Pravachol [Pravastatin Sodium] Myalgia   • Statins    • Zocor [Simvastatin] Myalgia   • Aspirin Rash   • Bactrim [Sulfamethoxazole-Trimethoprim] Rash       Current Medications    Current Outpatient Prescriptions:   •  aspirin 81 MG tablet, Take 1 tablet by mouth daily., Disp: , Rfl:   •  Azilsartan Medoxomil (EDARBI) 40 MG tablet, Take 40 mg by mouth Daily., Disp: 30 tablet, Rfl: 6  •  cinacalcet (SENSIPAR) 60 MG tablet, Take 1 tablet by mouth daily., Disp: , Rfl:   •  clopidogrel (PLAVIX) 75 MG tablet, TAKE 1 TABLET BY MOUTH ONE TIME A DAY , Disp: 30 tablet, Rfl: 10  •  denosumab (PROLIA) 60 MG/ML solution syringe, Inject 60 mg under the skin 1 (One) Time. Yearly, Disp: , Rfl:   •  Krill Oil 300 MG capsule, Take 1 capsule by mouth daily., Disp: , Rfl:   •  levothyroxine (SYNTHROID, LEVOTHROID) 88 MCG tablet, Take 1 tablet by mouth Daily., Disp: 90 tablet, Rfl: 1  •  magnesium oxide (MAG-OX) 400 MG tablet, Take 400 mg by mouth daily., Disp: , Rfl:   •  metoprolol succinate XL (TOPROL-XL) 25 MG 24 hr tablet, Take 1 tablet by mouth 2 (Two) Times a Day. (Patient taking differently: Take 25 mg by mouth Daily.), Disp: 60 tablet, Rfl: 6  •  Multiple Vitamins-Minerals (OCUVITE PO), Take 1 tablet by mouth Daily., Disp: , Rfl:   •  NIFEdipine XL (PROCARDIA XL) 60 MG 24 hr tablet, Take 1 tablet by mouth Daily., Disp: 30 tablet, Rfl: 11  •  nitroglycerin (NITROSTAT) 0.4 MG SL tablet, Place 0.4 mg under the tongue Every 5 (Five)  Minutes As Needed for Chest Pain. Take no more than 3 doses in 15 minutes., Disp: , Rfl:   •  pitavastatin calcium (LIVALO) 2 MG tablet tablet, Take 1 tablet by mouth Daily., Disp: 90 tablet, Rfl: 1          ROS  Review of Systems   Constitution: Positive for weakness and malaise/fatigue.   Cardiovascular: Positive for leg swelling and palpitations.   Respiratory: Positive for shortness of breath and snoring.        SOCIAL HX  Social History     Social History   • Marital status:      Spouse name: N/A   • Number of children: N/A   • Years of education: N/A     Occupational History   • Not on file.     Social History Main Topics   • Smoking status: Never Smoker   • Smokeless tobacco: Never Used   • Alcohol use No   • Drug use: No   • Sexual activity: Defer     Other Topics Concern   • Not on file     Social History Narrative       FAMILY HX  Family History   Problem Relation Age of Onset   • Anemia Mother    • Arthritis Mother    • Heart disease Mother    • Hypertension Mother    • Stroke Mother    • Cancer Father      stomach   • Breast cancer Neg Hx    • Ovarian cancer Neg Hx    • Colon cancer Neg Hx    • Endometrial cancer Neg Hx              Rick Qiu III, MD, FACC

## 2017-11-08 ENCOUNTER — OFFICE VISIT (OUTPATIENT)
Dept: CARDIOLOGY | Facility: CLINIC | Age: 77
End: 2017-11-08

## 2017-11-08 VITALS
HEIGHT: 62 IN | SYSTOLIC BLOOD PRESSURE: 138 MMHG | BODY MASS INDEX: 27.6 KG/M2 | HEART RATE: 65 BPM | DIASTOLIC BLOOD PRESSURE: 74 MMHG | WEIGHT: 150 LBS

## 2017-11-08 DIAGNOSIS — R00.2 PALPITATIONS: ICD-10-CM

## 2017-11-08 DIAGNOSIS — I10 ESSENTIAL HYPERTENSION: ICD-10-CM

## 2017-11-08 DIAGNOSIS — I25.10 CORONARY ARTERY DISEASE INVOLVING NATIVE CORONARY ARTERY OF NATIVE HEART WITHOUT ANGINA PECTORIS: ICD-10-CM

## 2017-11-08 DIAGNOSIS — E78.2 MIXED HYPERLIPIDEMIA: Primary | ICD-10-CM

## 2017-11-08 PROCEDURE — 99214 OFFICE O/P EST MOD 30 MIN: CPT | Performed by: INTERNAL MEDICINE

## 2017-11-08 NOTE — PROGRESS NOTES
[facility] at [facility]  Office visit  Nicolle Chavez  1940  [phone]    VISIT DATE:  09/25/2017    PCP: Christelle Gamez, DO  [address]  [facility] [address]    CC:  Chief Complaint   Patient presents with   • Coronary Artery Disease       PROBLEM LIST:  1. Coronary artery disease:  a. Field ST-elevation myocardial infarction, 11/25/2015, with associated chest pain, jaw pain, diaphoresis, and dyspnea; inferior-anterior ST elevation by outside telemetry strips.   b. Emergent cardiac catheterization, Dr. Isael Rosenbaum: Status post drug-eluting stent to the proximal right coronary artery using a 2.25 x 23 mm Xience Alpine stent.   c. Ejection fraction 55% to 60%.   2. Uncontrolled Essential HTN.  3. Dyspnea.   4. Mixed Dyslipidemia.   5. Hypothyroidism, on chronic supplementation.   6. 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   7. Lower extremity edema.   8. History of peptic ulcer disease and bleeding ulcer approximately 12 years ago; incomplete database.   9. Family history of coronary disease.   10. Anxiety.   11. Gout.   12. Surgical history:  a. Hysterectomy.   b. Left parathyroidectomy.  c. Ulcer repair.           ASSESSMENT:   Diagnosis Plan   1. Mixed hyperlipidemia     2. Essential hypertension     3. Coronary artery disease involving native coronary artery of native heart without angina pectoris     4. Palpitations         PLAN:  Palpitations: Low risk 14 day event monitor.  Hopefully symptoms will improve once sleep apnea has been treated.  Likely due to short burst of nonsustained atrial tachycardia.  Continue beta-blockade..    Coronary artery disease: Stable.  Discontinue Plavix.  Continue aspirin, beta blockade, statin.    Hyperlipidemia: Continue Livalo.  May consider Zetia for PCSK9 inhibitors in the future    Hypertension: Stable well-controlled.  Continue current medical therapy, goal less than  "140/90.    Subjective  Episodes of palpitations have significantly improved in frequency.  Reviewed results of recent 2 week event monitor which revealed rare PACs, PVCs and rare bursts of nonsustained atrial tachycardia which were asymptomatic during the monitoring period.  She is been diagnosed with sleep apnea and is currently being evaluated for options for therapy.  Denies chest pain.  Blood pressures have been running less than 140/90 mmHg.  does report some easy bruising.  Has noticed some more prominent spider veins on her lower extremities     PHYSICAL EXAMINATION:  Vitals:    11/08/17 1100   BP: 138/74   BP Location: Right arm   Patient Position: Sitting   Pulse: 65   Weight: 150 lb (68 kg)   Height: 62\" (157.5 cm)     General Appearance:    Alert, cooperative, no distress, appears stated age   Head:    Normocephalic, without obvious abnormality, atraumatic   Eyes:    conjunctiva/corneas clear   Nose:   Nares normal, septum midline, mucosa normal, no drainage   Throat:   Lips, teeth and gums normal   Neck:   Supple, symmetrical, trachea midline, no carotid    bruit or JVD   Lungs:     Clear to auscultation bilaterally, respirations unlabored   Chest Wall:    No tenderness or deformity    Heart:    Regular rate and rhythm, S1 and S2 normal, no murmur, rub   or gallop, normal carotid impulse bilaterally without bruit.   Abdomen:     Soft, non-tender   Extremities:   Extremities normal, atraumatic, no cyanosis or edema   Pulses:   2+ and symmetric all extremities   Skin:   Skin color, texture, turgor normal, no rashes or lesions       Diagnostic Data:  Procedures  Lab Results   Component Value Date    CHLPL 206 (H) 05/26/2016    TRIG 208 (H) 07/25/2017    HDL 61 (H) 07/25/2017    LDLDIRECT 140 (H) 07/25/2017     Lab Results   Component Value Date    GLUCOSE 95 07/25/2017    BUN 18 07/25/2017    CREATININE 0.90 07/25/2017     07/25/2017    K 4.6 07/25/2017     07/25/2017    CO2 28.0 07/25/2017 "     Lab Results   Component Value Date    HGBA1C 5.2 01/21/2016     Lab Results   Component Value Date    WBC 4.99 07/25/2017    HGB 13.8 07/25/2017    HCT 43.3 07/25/2017     07/25/2017       Allergies  Allergies   Allergen Reactions   • Crestor [Rosuvastatin Calcium] Myalgia   • Lipitor [Atorvastatin] Myalgia   • Pravachol [Pravastatin Sodium] Myalgia   • Statins    • Zocor [Simvastatin] Myalgia   • Aspirin Rash   • Bactrim [Sulfamethoxazole-Trimethoprim] Rash       Current Medications    Current Outpatient Prescriptions:   •  aspirin 81 MG tablet, Take 1 tablet by mouth daily., Disp: , Rfl:   •  Azilsartan Medoxomil (EDARBI) 40 MG tablet, Take 40 mg by mouth Daily., Disp: 30 tablet, Rfl: 6  •  cinacalcet (SENSIPAR) 60 MG tablet, Take 1 tablet by mouth daily., Disp: , Rfl:   •  clopidogrel (PLAVIX) 75 MG tablet, TAKE 1 TABLET BY MOUTH ONE TIME A DAY , Disp: 30 tablet, Rfl: 10  •  denosumab (PROLIA) 60 MG/ML solution syringe, Inject 60 mg under the skin 1 (One) Time. Yearly, Disp: , Rfl:   •  Krill Oil 300 MG capsule, Take 1 capsule by mouth daily., Disp: , Rfl:   •  levothyroxine (SYNTHROID, LEVOTHROID) 88 MCG tablet, Take 1 tablet by mouth Daily., Disp: 90 tablet, Rfl: 1  •  magnesium oxide (MAG-OX) 400 MG tablet, Take 400 mg by mouth daily., Disp: , Rfl:   •  metoprolol succinate XL (TOPROL-XL) 25 MG 24 hr tablet, Take 1 tablet by mouth 2 (Two) Times a Day. (Patient taking differently: Take 25 mg by mouth Daily.), Disp: 60 tablet, Rfl: 6  •  Multiple Vitamins-Minerals (OCUVITE PO), Take 1 tablet by mouth Daily., Disp: , Rfl:   •  NIFEdipine XL (PROCARDIA XL) 60 MG 24 hr tablet, Take 1 tablet by mouth Daily., Disp: 30 tablet, Rfl: 11  •  nitroglycerin (NITROSTAT) 0.4 MG SL tablet, Place 0.4 mg under the tongue Every 5 (Five) Minutes As Needed for Chest Pain. Take no more than 3 doses in 15 minutes., Disp: , Rfl:   •  pitavastatin calcium (LIVALO) 2 MG tablet tablet, Take 1 tablet by mouth Daily., Disp: 90  tablet, Rfl: 1          ROS  Review of Systems   Constitution: Positive for weakness.   Cardiovascular: Positive for leg swelling. Negative for palpitations.   Respiratory: Positive for snoring. Negative for shortness of breath.        SOCIAL HX  Social History     Social History   • Marital status:      Spouse name: N/A   • Number of children: N/A   • Years of education: N/A     Occupational History   • Not on file.     Social History Main Topics   • Smoking status: Never Smoker   • Smokeless tobacco: Never Used   • Alcohol use No   • Drug use: No   • Sexual activity: Defer     Other Topics Concern   • Not on file     Social History Narrative       FAMILY HX  Family History   Problem Relation Age of Onset   • Anemia Mother    • Arthritis Mother    • Heart disease Mother    • Hypertension Mother    • Stroke Mother    • Cancer Father      stomach   • Breast cancer Neg Hx    • Ovarian cancer Neg Hx    • Colon cancer Neg Hx    • Endometrial cancer Neg Hx              Rick Qiu III, MD, FACC

## 2017-11-28 RX ORDER — CLOPIDOGREL BISULFATE 75 MG/1
TABLET ORAL
Qty: 30 TABLET | Refills: 10 | OUTPATIENT
Start: 2017-11-28

## 2018-01-25 ENCOUNTER — OFFICE VISIT (OUTPATIENT)
Dept: INTERNAL MEDICINE | Facility: CLINIC | Age: 78
End: 2018-01-25

## 2018-01-25 VITALS
HEART RATE: 60 BPM | DIASTOLIC BLOOD PRESSURE: 64 MMHG | SYSTOLIC BLOOD PRESSURE: 118 MMHG | HEIGHT: 62 IN | WEIGHT: 145 LBS | RESPIRATION RATE: 16 BRPM | BODY MASS INDEX: 26.68 KG/M2

## 2018-01-25 DIAGNOSIS — I10 ESSENTIAL HYPERTENSION: ICD-10-CM

## 2018-01-25 DIAGNOSIS — K21.9 GASTROESOPHAGEAL REFLUX DISEASE, ESOPHAGITIS PRESENCE NOT SPECIFIED: ICD-10-CM

## 2018-01-25 DIAGNOSIS — E78.5 HYPERLIPIDEMIA LDL GOAL <100: Primary | ICD-10-CM

## 2018-01-25 DIAGNOSIS — E03.8 OTHER SPECIFIED HYPOTHYROIDISM: ICD-10-CM

## 2018-01-25 LAB
ALBUMIN SERPL-MCNC: 4.5 G/DL (ref 3.2–4.8)
ALBUMIN/GLOB SERPL: 2.3 G/DL (ref 1.5–2.5)
ALP SERPL-CCNC: 77 U/L (ref 25–100)
ALT SERPL W P-5'-P-CCNC: 21 U/L (ref 7–40)
ANION GAP SERPL CALCULATED.3IONS-SCNC: 5 MMOL/L (ref 3–11)
ARTICHOKE IGE QN: 145 MG/DL (ref 0–130)
AST SERPL-CCNC: 21 U/L (ref 0–33)
BILIRUB SERPL-MCNC: 0.6 MG/DL (ref 0.3–1.2)
BUN BLD-MCNC: 24 MG/DL (ref 9–23)
BUN/CREAT SERPL: 26.7 (ref 7–25)
CALCIUM SPEC-SCNC: 9 MG/DL (ref 8.7–10.4)
CHLORIDE SERPL-SCNC: 102 MMOL/L (ref 99–109)
CHOLEST SERPL-MCNC: 218 MG/DL (ref 0–200)
CO2 SERPL-SCNC: 29 MMOL/L (ref 20–31)
CREAT BLD-MCNC: 0.9 MG/DL (ref 0.6–1.3)
GFR SERPL CREATININE-BSD FRML MDRD: 60 ML/MIN/1.73
GLOBULIN UR ELPH-MCNC: 2 GM/DL
GLUCOSE BLD-MCNC: 93 MG/DL (ref 70–100)
HDLC SERPL-MCNC: 59 MG/DL (ref 40–60)
POC CREATININE URINE: 100
POC MICROALBUMIN URINE: 30
POTASSIUM BLD-SCNC: 4.5 MMOL/L (ref 3.5–5.5)
PROT SERPL-MCNC: 6.5 G/DL (ref 5.7–8.2)
SODIUM BLD-SCNC: 136 MMOL/L (ref 132–146)
TRIGL SERPL-MCNC: 178 MG/DL (ref 0–150)
TSH SERPL DL<=0.05 MIU/L-ACNC: 2.48 MIU/ML (ref 0.35–5.35)

## 2018-01-25 PROCEDURE — 99214 OFFICE O/P EST MOD 30 MIN: CPT | Performed by: INTERNAL MEDICINE

## 2018-01-25 PROCEDURE — 80053 COMPREHEN METABOLIC PANEL: CPT | Performed by: INTERNAL MEDICINE

## 2018-01-25 PROCEDURE — 84443 ASSAY THYROID STIM HORMONE: CPT | Performed by: INTERNAL MEDICINE

## 2018-01-25 PROCEDURE — 80061 LIPID PANEL: CPT | Performed by: INTERNAL MEDICINE

## 2018-01-25 PROCEDURE — 82570 ASSAY OF URINE CREATININE: CPT | Performed by: INTERNAL MEDICINE

## 2018-01-25 PROCEDURE — 82044 UR ALBUMIN SEMIQUANTITATIVE: CPT | Performed by: INTERNAL MEDICINE

## 2018-01-25 NOTE — PROGRESS NOTES
Subjective   Nicolle Chavez is a 77 y.o. female.   Chief Complaint   Patient presents with   • Hypertension       Hypertension   This is a chronic problem. The current episode started more than 1 year ago. Pertinent negatives include no chest pain, headaches, neck pain, palpitations or shortness of breath. There are no compliance problems.    Hyperlipidemia   This is a chronic problem. The current episode started more than 1 year ago. Exacerbating diseases include hypothyroidism. Pertinent negatives include no chest pain, myalgias or shortness of breath. There are no compliance problems.    Hypothyroidism   This is a chronic problem. Pertinent negatives include no abdominal pain, arthralgias, chest pain, chills, congestion, coughing, diaphoresis, fatigue, fever, headaches, myalgias, nausea, neck pain, numbness, rash, sore throat or vomiting.        The following portions of the patient's history were reviewed and updated as appropriate: allergies, current medications, past family history, past medical history, past social history, past surgical history and problem list.    Review of Systems   Constitutional: Negative for activity change, appetite change, chills, diaphoresis, fatigue, fever and unexpected weight change.   HENT: Negative for congestion, ear discharge, ear pain, mouth sores, nosebleeds, sinus pressure, sneezing and sore throat.    Eyes: Negative for pain, discharge and itching.   Respiratory: Negative for cough, chest tightness, shortness of breath and wheezing.    Cardiovascular: Negative for chest pain, palpitations and leg swelling.   Gastrointestinal: Negative for abdominal pain, constipation, diarrhea, nausea and vomiting.   Endocrine: Negative for cold intolerance, heat intolerance, polydipsia and polyphagia.   Genitourinary: Negative for dysuria, flank pain, frequency, hematuria and urgency.   Musculoskeletal: Negative for arthralgias, back pain, gait problem, myalgias, neck pain and neck  "stiffness.   Skin: Negative for color change, pallor and rash.   Neurological: Negative for seizures, speech difficulty, numbness and headaches.   Psychiatric/Behavioral: Negative for agitation, confusion, decreased concentration and sleep disturbance. The patient is not nervous/anxious.        /64  Pulse 60  Resp 16  Ht 157.5 cm (62\")  Wt 65.8 kg (145 lb)  BMI 26.52 kg/m2    Objective   Physical Exam   Constitutional: She appears well-developed.   HENT:   Head: Normocephalic.   Right Ear: External ear normal.   Left Ear: External ear normal.   Nose: Nose normal.   Mouth/Throat: Oropharynx is clear and moist.   Eyes: Conjunctivae are normal. Pupils are equal, round, and reactive to light.   Neck: No JVD present. No thyromegaly present.   Cardiovascular: Normal rate, regular rhythm and normal heart sounds.  Exam reveals no friction rub.    No murmur heard.  Pulmonary/Chest: Effort normal and breath sounds normal. No respiratory distress. She has no wheezes. She has no rales.   Abdominal: Soft. Bowel sounds are normal. She exhibits no distension. There is no tenderness. There is no guarding.   Musculoskeletal: She exhibits no edema or tenderness.   Lymphadenopathy:     She has no cervical adenopathy.   Neurological: She displays normal reflexes. No cranial nerve deficit.   Skin: Skin is warm and dry. No rash noted.   Psychiatric: She has a normal mood and affect. Her behavior is normal.   Nursing note and vitals reviewed.      Assessment/Plan   Nicolle was seen today for hypertension, hyperlipidemia, hypothyroidism and heartburn.    Diagnoses and all orders for this visit:    Essential hypertension  Stable cmp, lipids, urine micro  Gastroesophageal reflux disease, esophagitis presence not specified  Has stopped the omeprazole for concern with kidney dx. Has been okay without  Other specified hypothyroidism  stable  Hyperlipidemia  On Livalo .will going to take QOD because of leg pain and cramps.  Other " orders  -     pitavastatin calcium (LIVALO) 2 MG tablet tablet; Take 1 tablet by mouth every night.      Has put her colonoscopy on hold secondary to her 's health.

## 2018-02-01 RX ORDER — LEVOTHYROXINE SODIUM 88 UG/1
TABLET ORAL
Qty: 90 TABLET | Refills: 0 | Status: SHIPPED | OUTPATIENT
Start: 2018-02-01 | End: 2018-05-04 | Stop reason: SDUPTHER

## 2018-02-01 RX ORDER — PITAVASTATIN CALCIUM 2.09 MG/1
TABLET, FILM COATED ORAL
Qty: 90 TABLET | Refills: 0 | Status: SHIPPED | OUTPATIENT
Start: 2018-02-01 | End: 2018-05-04 | Stop reason: SDUPTHER

## 2018-03-31 DIAGNOSIS — I10 ESSENTIAL HYPERTENSION: ICD-10-CM

## 2018-04-03 RX ORDER — NIFEDIPINE 60 MG/1
TABLET, EXTENDED RELEASE ORAL
Qty: 90 TABLET | Refills: 0 | Status: SHIPPED | OUTPATIENT
Start: 2018-04-03 | End: 2018-05-08 | Stop reason: SDUPTHER

## 2018-04-12 DIAGNOSIS — I10 ESSENTIAL HYPERTENSION: ICD-10-CM

## 2018-04-12 RX ORDER — NIFEDIPINE 60 MG/1
60 TABLET, EXTENDED RELEASE ORAL DAILY
Qty: 30 TABLET | Refills: 10 | Status: SHIPPED | OUTPATIENT
Start: 2018-04-12 | End: 2019-05-20

## 2018-04-12 RX ORDER — NIFEDIPINE 60 MG/1
TABLET, EXTENDED RELEASE ORAL
Qty: 30 TABLET | Refills: 10 | Status: SHIPPED | OUTPATIENT
Start: 2018-04-12 | End: 2018-04-12 | Stop reason: SDUPTHER

## 2018-04-20 ENCOUNTER — HOSPITAL ENCOUNTER (EMERGENCY)
Facility: HOSPITAL | Age: 78
Discharge: HOME | End: 2018-04-20
Attending: EMERGENCY MEDICINE | Admitting: EMERGENCY MEDICINE

## 2018-04-20 ENCOUNTER — APPOINTMENT (OUTPATIENT)
Dept: GENERAL RADIOLOGY | Facility: HOSPITAL | Age: 78
End: 2018-04-20
Attending: EMERGENCY MEDICINE

## 2018-04-20 VITALS
WEIGHT: 145.3 LBS | DIASTOLIC BLOOD PRESSURE: 74 MMHG | RESPIRATION RATE: 18 BRPM | TEMPERATURE: 97.9 F | BODY MASS INDEX: 26.74 KG/M2 | HEIGHT: 62 IN | HEART RATE: 77 BPM | OXYGEN SATURATION: 96 % | SYSTOLIC BLOOD PRESSURE: 114 MMHG

## 2018-04-20 DIAGNOSIS — J18.9 PNEUMONIA OF LEFT LOWER LOBE DUE TO INFECTIOUS ORGANISM: Primary | ICD-10-CM

## 2018-04-20 LAB
ALBUMIN SERPL-MCNC: 4.4 G/DL (ref 3.5–5)
ALBUMIN/GLOB SERPL: 1.6 G/DL (ref 1–2)
ALP SERPL-CCNC: 79 U/L (ref 38–126)
ALT SERPL W P-5'-P-CCNC: 28 U/L (ref 13–69)
ANION GAP SERPL CALCULATED.3IONS-SCNC: 14.7 MMOL/L (ref 10–20)
AST SERPL-CCNC: 18 U/L (ref 15–46)
BASOPHILS # BLD AUTO: 0.01 10*3/MM3 (ref 0–0.2)
BASOPHILS NFR BLD AUTO: 0.1 % (ref 0–2.5)
BILIRUB SERPL-MCNC: 1 MG/DL (ref 0.2–1.3)
BUN BLD-MCNC: 12 MG/DL (ref 7–20)
BUN/CREAT SERPL: 15 (ref 7.1–23.5)
CALCIUM SPEC-SCNC: 8.9 MG/DL (ref 8.4–10.2)
CHLORIDE SERPL-SCNC: 102 MMOL/L (ref 98–107)
CO2 SERPL-SCNC: 25 MMOL/L (ref 26–30)
CREAT BLD-MCNC: 0.8 MG/DL (ref 0.6–1.3)
D-LACTATE SERPL-SCNC: 1 MMOL/L (ref 0.5–2)
DEPRECATED RDW RBC AUTO: 43.1 FL (ref 37–54)
EOSINOPHIL # BLD AUTO: 0.05 10*3/MM3 (ref 0–0.7)
EOSINOPHIL NFR BLD AUTO: 0.7 % (ref 0–7)
ERYTHROCYTE [DISTWIDTH] IN BLOOD BY AUTOMATED COUNT: 13.1 % (ref 11.5–14.5)
GFR SERPL CREATININE-BSD FRML MDRD: 69 ML/MIN/1.73
GLOBULIN UR ELPH-MCNC: 2.7 GM/DL
GLUCOSE BLD-MCNC: 102 MG/DL (ref 74–98)
HCT VFR BLD AUTO: 38.6 % (ref 37–47)
HGB BLD-MCNC: 12.8 G/DL (ref 12–16)
IMM GRANULOCYTES # BLD: 0.01 10*3/MM3 (ref 0–0.06)
IMM GRANULOCYTES NFR BLD: 0.1 % (ref 0–0.6)
LYMPHOCYTES # BLD AUTO: 1.32 10*3/MM3 (ref 0.6–3.4)
LYMPHOCYTES NFR BLD AUTO: 18.8 % (ref 10–50)
MCH RBC QN AUTO: 29.6 PG (ref 27–31)
MCHC RBC AUTO-ENTMCNC: 33.2 G/DL (ref 30–37)
MCV RBC AUTO: 89.4 FL (ref 81–99)
MONOCYTES # BLD AUTO: 1.29 10*3/MM3 (ref 0–0.9)
MONOCYTES NFR BLD AUTO: 18.3 % (ref 0–12)
NEUTROPHILS # BLD AUTO: 4.36 10*3/MM3 (ref 2–6.9)
NEUTROPHILS NFR BLD AUTO: 62 % (ref 37–80)
NRBC BLD MANUAL-RTO: 0 /100 WBC (ref 0–0)
PLATELET # BLD AUTO: 137 10*3/MM3 (ref 130–400)
PMV BLD AUTO: 9.7 FL (ref 6–12)
POTASSIUM BLD-SCNC: 3.7 MMOL/L (ref 3.5–5.1)
PROT SERPL-MCNC: 7.1 G/DL (ref 6.3–8.2)
RBC # BLD AUTO: 4.32 10*6/MM3 (ref 4.2–5.4)
S PYO AG THROAT QL: NEGATIVE
SODIUM BLD-SCNC: 138 MMOL/L (ref 137–145)
TROPONIN I SERPL-MCNC: <0.012 NG/ML (ref 0–0.03)
WBC NRBC COR # BLD: 7.04 10*3/MM3 (ref 4.8–10.8)

## 2018-04-20 PROCEDURE — 80053 COMPREHEN METABOLIC PANEL: CPT | Performed by: NURSE PRACTITIONER

## 2018-04-20 PROCEDURE — 94799 UNLISTED PULMONARY SVC/PX: CPT

## 2018-04-20 PROCEDURE — 71046 X-RAY EXAM CHEST 2 VIEWS: CPT

## 2018-04-20 PROCEDURE — 94640 AIRWAY INHALATION TREATMENT: CPT

## 2018-04-20 PROCEDURE — 85025 COMPLETE CBC W/AUTO DIFF WBC: CPT | Performed by: NURSE PRACTITIONER

## 2018-04-20 PROCEDURE — 84484 ASSAY OF TROPONIN QUANT: CPT | Performed by: NURSE PRACTITIONER

## 2018-04-20 PROCEDURE — 83605 ASSAY OF LACTIC ACID: CPT | Performed by: NURSE PRACTITIONER

## 2018-04-20 PROCEDURE — 87880 STREP A ASSAY W/OPTIC: CPT | Performed by: EMERGENCY MEDICINE

## 2018-04-20 PROCEDURE — 99283 EMERGENCY DEPT VISIT LOW MDM: CPT

## 2018-04-20 PROCEDURE — 87081 CULTURE SCREEN ONLY: CPT | Performed by: EMERGENCY MEDICINE

## 2018-04-20 RX ORDER — IPRATROPIUM BROMIDE AND ALBUTEROL SULFATE 2.5; .5 MG/3ML; MG/3ML
3 SOLUTION RESPIRATORY (INHALATION) ONCE
Status: COMPLETED | OUTPATIENT
Start: 2018-04-20 | End: 2018-04-20

## 2018-04-20 RX ORDER — SODIUM CHLORIDE 0.9 % (FLUSH) 0.9 %
10 SYRINGE (ML) INJECTION AS NEEDED
Status: DISCONTINUED | OUTPATIENT
Start: 2018-04-20 | End: 2018-04-20 | Stop reason: HOSPADM

## 2018-04-20 RX ORDER — ALBUTEROL SULFATE 90 UG/1
2 AEROSOL, METERED RESPIRATORY (INHALATION) EVERY 4 HOURS PRN
Qty: 1 INHALER | Refills: 0 | Status: SHIPPED | OUTPATIENT
Start: 2018-04-20 | End: 2018-11-14

## 2018-04-20 RX ORDER — AZITHROMYCIN 250 MG/1
250 TABLET, FILM COATED ORAL DAILY
Qty: 6 TABLET | Refills: 0 | Status: SHIPPED | OUTPATIENT
Start: 2018-04-20 | End: 2018-05-08

## 2018-04-20 RX ORDER — CEFUROXIME AXETIL 500 MG/1
500 TABLET ORAL 2 TIMES DAILY
Qty: 20 TABLET | Refills: 0 | Status: SHIPPED | OUTPATIENT
Start: 2018-04-20 | End: 2018-05-08

## 2018-04-20 RX ADMIN — SODIUM CHLORIDE 1000 ML: 9 INJECTION, SOLUTION INTRAVENOUS at 10:29

## 2018-04-20 RX ADMIN — IPRATROPIUM BROMIDE AND ALBUTEROL SULFATE 3 ML: .5; 3 SOLUTION RESPIRATORY (INHALATION) at 10:05

## 2018-04-20 NOTE — ED PROVIDER NOTES
Subjective   History of Present Illness  This is a 77-year-old female comes in today complaining of cough, congestion, fever, nausea ×6 days.  She reports the cough is gotten consistently worse.  She does report a temperature of 101 yesterday.  She reports for the past 2 days she's had increasing shortness of breath as well.  She denies any vomiting or diarrhea.  Review of Systems   Constitutional: Negative.    HENT: Positive for congestion and sore throat.    Respiratory: Positive for cough, chest tightness and shortness of breath.    Cardiovascular: Negative.    Gastrointestinal: Negative.    Endocrine: Negative.    Genitourinary: Negative.    Musculoskeletal: Negative.    Skin: Negative.    Allergic/Immunologic: Negative.    Neurological: Negative.    Hematological: Negative.    Psychiatric/Behavioral: Negative.    All other systems reviewed and are negative.      Past Medical History:   Diagnosis Date   • Abdominal pain     Last Impression: 26 May 2015  Improving. The patient states she had a history of lower   abdominal pain after taking antibiotics for UTI, but this has improved.  Sanjiv Mak (Gastroenterology)   • Abnormal weight gain    • Anxiety 12/12/2016   • Cataract    • Cataract 2014   • Coronary artery disease 12/12/2016    Field ST-elevation myocardial infarction, 11/25/2015, with associated chest pain, jaw pain, diaphoresis, and dyspnea; inferior-anterior ST elevation by outside telemetry strips.  Emergent cardiac catheterization, Dr. Isael Rosenbaum:  Status post drug-eluting stent to the proximal right coronary artery using a 2.25 x 23 mm Xience Alpine stent.  Ejection fraction 55% to 60%.    • Gout     2012   • Gout 2012   • H/O bone density study    • Headache    • Headache    • High cholesterol     1995   • Hyperparathyroidism     2009   • Hypertension    • Hypothyroidism    • Myocardial infarction    • Parathyroid disorder 12/12/2016    with a history of a left parathyroid  parathyroidectomy.    • Peptic ulcer disease     and bleeding ulcer approximately 12 years ago; incomplete database.    • Sleep apnea        Allergies   Allergen Reactions   • Crestor [Rosuvastatin Calcium] Myalgia   • Lipitor [Atorvastatin] Myalgia   • Pravachol [Pravastatin Sodium] Myalgia   • Statins    • Zocor [Simvastatin] Myalgia   • Aspirin Rash   • Bactrim [Sulfamethoxazole-Trimethoprim] Rash       Past Surgical History:   Procedure Laterality Date   • APPENDECTOMY     • BREAST BIOPSY Right 1995    Ultrasound guided needle biopsy   • COLONOSCOPY     • HYSTERECTOMY Bilateral 1978   • OOPHORECTOMY     • OTHER SURGICAL HISTORY      Ulcer repair   • PARATHYROIDECTOMY Left        Family History   Problem Relation Age of Onset   • Anemia Mother    • Arthritis Mother    • Heart disease Mother    • Hypertension Mother    • Stroke Mother    • Cancer Father      stomach   • Breast cancer Neg Hx    • Ovarian cancer Neg Hx    • Colon cancer Neg Hx    • Endometrial cancer Neg Hx        Social History     Social History   • Marital status:      Social History Main Topics   • Smoking status: Never Smoker   • Smokeless tobacco: Never Used   • Alcohol use No   • Drug use: No   • Sexual activity: Defer     Other Topics Concern   • Not on file           Objective   Physical Exam   Constitutional: She appears well-developed and well-nourished.   Nursing note and vitals reviewed.  GEN: No acute distress  Head: Normocephalic, atraumatic  Eyes: Pupils equal round reactive to light  ENT: Posterior pharynx normal in appearance, oral mucosa is moist  Chest: Nontender to palpation  Cardiovascular: Regular rate  Lungs: wheezes scattered, right lower lobe decreased.   Abdomen: Soft, nontender, nondistended, no peritoneal signs  Extremities: No edema, normal appearance  Neuro: GCS 15  Psych: Mood and affect are appropriate      Procedures         ED Course  ED Course   Comment By Time   Discussed plan of care with patient and   with results of test. I have discussed sending her home with strict return to care instructions. She is agreeable to this plan of care. I have also advised her to follow up with her PCP on Monday.  Astrid Duncan, APRN 04/20 1149                  [facility]  Number of Diagnoses or Management Options  Diagnosis management comments: I suspect pneumonia. We will get cxr, cbc, cmp, troponin also because she reports having some intermittent chest pain over the past few days. Also we will get a lactic acid. I will given her a neb treatment to see if this helps her shortness of air.       Amount and/or Complexity of Data Reviewed  Clinical lab tests: reviewed and ordered  Tests in the radiology section of CPT®: reviewed and ordered  Review and summarize past medical records: yes  Discuss the patient with other providers: yes  Independent visualization of images, tracings, or specimens: yes    Risk of Complications, Morbidity, and/or Mortality  Presenting problems: moderate  Diagnostic procedures: moderate  Management options: moderate        Final diagnoses:   Pneumonia of left lower lobe due to infectious organism            Astrid Duncan, CHUCHO  04/20/18 [id number]

## 2018-04-22 LAB — BACTERIA SPEC AEROBE CULT: NORMAL

## 2018-04-30 RX ORDER — AZILSARTAN KAMEDOXOMIL 40 MG/1
TABLET ORAL
Qty: 30 TABLET | Refills: 6 | Status: SHIPPED | OUTPATIENT
Start: 2018-04-30 | End: 2019-01-19 | Stop reason: SDUPTHER

## 2018-05-04 RX ORDER — LEVOTHYROXINE SODIUM 88 UG/1
TABLET ORAL
Qty: 90 TABLET | Refills: 0 | Status: SHIPPED | OUTPATIENT
Start: 2018-05-04 | End: 2018-07-01 | Stop reason: SDUPTHER

## 2018-05-04 RX ORDER — PITAVASTATIN CALCIUM 2.09 MG/1
TABLET, FILM COATED ORAL
Qty: 90 TABLET | Refills: 0 | Status: SHIPPED | OUTPATIENT
Start: 2018-05-04 | End: 2018-07-01 | Stop reason: SDUPTHER

## 2018-05-08 ENCOUNTER — OFFICE VISIT (OUTPATIENT)
Dept: INTERNAL MEDICINE | Facility: CLINIC | Age: 78
End: 2018-05-08

## 2018-05-08 ENCOUNTER — HOSPITAL ENCOUNTER (OUTPATIENT)
Dept: GENERAL RADIOLOGY | Facility: HOSPITAL | Age: 78
Discharge: HOME | End: 2018-05-08
Attending: INTERNAL MEDICINE | Admitting: INTERNAL MEDICINE

## 2018-05-08 VITALS
HEART RATE: 62 BPM | WEIGHT: 148.1 LBS | OXYGEN SATURATION: 98 % | DIASTOLIC BLOOD PRESSURE: 90 MMHG | BODY MASS INDEX: 27.09 KG/M2 | SYSTOLIC BLOOD PRESSURE: 130 MMHG

## 2018-05-08 DIAGNOSIS — J18.9 COMMUNITY ACQUIRED PNEUMONIA OF LEFT LOWER LOBE OF LUNG: ICD-10-CM

## 2018-05-08 DIAGNOSIS — J18.9 COMMUNITY ACQUIRED PNEUMONIA OF LEFT LOWER LOBE OF LUNG: Primary | ICD-10-CM

## 2018-05-08 PROCEDURE — 99213 OFFICE O/P EST LOW 20 MIN: CPT | Performed by: INTERNAL MEDICINE

## 2018-05-08 PROCEDURE — 71046 X-RAY EXAM CHEST 2 VIEWS: CPT

## 2018-05-08 RX ORDER — OXYBUTYNIN CHLORIDE 5 MG/1
5 TABLET, EXTENDED RELEASE ORAL DAILY
Refills: 5 | COMMUNITY
Start: 2018-04-26 | End: 2018-10-31

## 2018-05-08 RX ORDER — CONJUGATED ESTROGENS 0.62 MG/G
CREAM VAGINAL WEEKLY
COMMUNITY
Start: 2018-05-04

## 2018-05-08 NOTE — PROGRESS NOTES
Subjective   Nicolle Chavez is a 77 y.o. female.   Chief Complaint   Patient presents with   • ER Follow Up       History of Present Illness   Was seen in ER  On 4/21/18 for cough, congestion, fever, nausea x 6 days.  Tmax 101 with increasing SOB>. CXR showed airspace disease in left base consistent with pneumonia. Strep a negative. Trop negative.  CMP showed mild increase in glucose with slight decrease in GFR. WBC were normal. .  Given neb treatment. Discharge with zpak  And ceftin.  Cough and SOB had improved until two days ago when cough returned. No fever or chills.  No sinus drainage.   The following portions of the patient's history were reviewed and updated as appropriate: allergies, current medications, past family history, past medical history, past social history, past surgical history and problem list.    Review of Systems   Constitutional: Negative for activity change, appetite change, chills, diaphoresis, fatigue, fever and unexpected weight change.   HENT: Negative for congestion, ear discharge, ear pain, mouth sores, nosebleeds, sinus pressure, sneezing and sore throat.    Eyes: Negative for pain, discharge and itching.   Respiratory: Negative for chest tightness, shortness of breath and wheezing.    Cardiovascular: Negative for chest pain, palpitations and leg swelling.   Gastrointestinal: Negative for abdominal pain, constipation, diarrhea, nausea and vomiting.   Endocrine: Negative for cold intolerance, heat intolerance, polydipsia and polyphagia.   Genitourinary: Negative for dysuria, flank pain, frequency, hematuria and urgency.   Musculoskeletal: Negative for arthralgias, back pain, gait problem, myalgias, neck pain and neck stiffness.   Skin: Negative for color change, pallor and rash.   Neurological: Negative for seizures, speech difficulty, numbness and headaches.   Psychiatric/Behavioral: Negative for agitation, confusion, decreased concentration and sleep disturbance. The patient is  not nervous/anxious.      /90   Pulse 62   Wt 67.2 kg (148 lb 1.6 oz)   SpO2 98%   BMI 27.09 kg/m²     Objective   Physical Exam   Constitutional: She appears well-developed.   HENT:   Head: Normocephalic.   Right Ear: External ear normal.   Left Ear: External ear normal.   Nose: Nose normal.   Mouth/Throat: Oropharynx is clear and moist.   Eyes: Conjunctivae are normal. Pupils are equal, round, and reactive to light.   Neck: No JVD present. No thyromegaly present.   Cardiovascular: Normal rate, regular rhythm and normal heart sounds.  Exam reveals no friction rub.    No murmur heard.  Pulmonary/Chest: Effort normal and breath sounds normal. No respiratory distress. She has no wheezes. She has no rales.   Abdominal: Soft. Bowel sounds are normal. She exhibits no distension. There is no tenderness. There is no guarding.   Musculoskeletal: She exhibits no edema or tenderness.   Lymphadenopathy:     She has no cervical adenopathy.   Neurological: She displays normal reflexes. No cranial nerve deficit.   Skin: No rash noted.   Psychiatric: Her behavior is normal.   Nursing note and vitals reviewed.      Assessment/Plan   Nicolle was seen today for er follow up.    Diagnoses and all orders for this visit:    Community acquired pneumonia of left lower lobe of lung  -     XR Chest PA & Lateral; Future

## 2018-05-09 ENCOUNTER — OFFICE VISIT (OUTPATIENT)
Dept: CARDIOLOGY | Facility: CLINIC | Age: 78
End: 2018-05-09

## 2018-05-09 ENCOUNTER — TELEPHONE (OUTPATIENT)
Dept: INTERNAL MEDICINE | Facility: CLINIC | Age: 78
End: 2018-05-09

## 2018-05-09 VITALS
SYSTOLIC BLOOD PRESSURE: 122 MMHG | BODY MASS INDEX: 26.87 KG/M2 | HEART RATE: 63 BPM | WEIGHT: 146 LBS | DIASTOLIC BLOOD PRESSURE: 60 MMHG | HEIGHT: 62 IN

## 2018-05-09 DIAGNOSIS — R00.2 PALPITATIONS: ICD-10-CM

## 2018-05-09 DIAGNOSIS — E78.5 HYPERLIPIDEMIA LDL GOAL <100: Primary | ICD-10-CM

## 2018-05-09 DIAGNOSIS — I25.10 CORONARY ARTERY DISEASE INVOLVING NATIVE CORONARY ARTERY OF NATIVE HEART WITHOUT ANGINA PECTORIS: ICD-10-CM

## 2018-05-09 DIAGNOSIS — I10 ESSENTIAL HYPERTENSION: ICD-10-CM

## 2018-05-09 PROCEDURE — 99214 OFFICE O/P EST MOD 30 MIN: CPT | Performed by: INTERNAL MEDICINE

## 2018-05-09 RX ORDER — EZETIMIBE 10 MG/1
10 TABLET ORAL DAILY
Qty: 30 TABLET | Refills: 11 | Status: SHIPPED | OUTPATIENT
Start: 2018-05-09 | End: 2019-02-28

## 2018-05-09 NOTE — TELEPHONE ENCOUNTER
Patient returned your call, states she will be home the rest of the day if you want to give her a call.

## 2018-05-09 NOTE — PROGRESS NOTES
[facility] at [facility]  Office visit  Nicolle Chavez  1940  [phone]    VISIT DATE:  09/25/2017    PCP: Christelle Gamez, DO  [id number] [facility] [address]  [place], located within [facility] [address]    CC:  Chief Complaint   Patient presents with   • Hypertension       PROBLEM LIST:  1. Coronary artery disease:  a. Field ST-elevation myocardial infarction, 11/25/2015, with associated chest pain, jaw pain, diaphoresis, and dyspnea; inferior-anterior ST elevation by outside telemetry strips.   b. Emergent cardiac catheterization, Dr. Isael Rosenbaum: Status post drug-eluting stent to the proximal right coronary artery using a 2.25 x 23 mm Xience Alpine stent.   c. Ejection fraction 55% to 60%.   2. Uncontrolled Essential HTN.  3. Dyspnea.   4. Mixed Dyslipidemia.   5. Hypothyroidism, on chronic supplementation.   6. 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   7. Lower extremity edema.   8. History of peptic ulcer disease and bleeding ulcer approximately 12 years ago; incomplete database.   9. Family history of coronary disease.   10. Anxiety.   11. Gout.   12. Surgical history:  a. Hysterectomy.   b. Left parathyroidectomy.  c. Ulcer repair.           ASSESSMENT:   Diagnosis Plan   1. Hyperlipidemia LDL goal <100     2. Essential hypertension     3. Coronary artery disease involving native coronary artery of native heart without angina pectoris     4. Palpitations         PLAN:  Coronary artery disease: Stable.  Continue aspirin, beta blockade, statin.    Hyperlipidemia: Continue Livalo.  Goal LDL less than 70, adding Zetia 10 mg by mouth daily.  Would be a candidate for PCSK9 inhibitors in the future    Hypertension: Stable well-controlled.  Continue current medical therapy, goal less than 140/90.    Subjective  Recovering from recent left lower lobe community acquired pneumonia.  Reviewed most recent fasting lipid panel, LDL remains suboptimal.  Denies chest pain.  Blood  "pressures have been running less than 140/90 mmHg.  easy bruising improved after discontinuation of Plavix.  Blood pressures running less than 130/80 mmHg.  Maintaining an active lifestyle.  Compliant with medical therapy.    PHYSICAL EXAMINATION:  Vitals:    05/09/18 1117   BP: 122/60   BP Location: Right arm   Patient Position: Sitting   Pulse: 63   Weight: 66.2 kg (146 lb)   Height: 157.5 cm (62\")     General Appearance:    Alert, cooperative, no distress, appears stated age   Head:    Normocephalic, without obvious abnormality, atraumatic   Eyes:    conjunctiva/corneas clear   Nose:   Nares normal, septum midline, mucosa normal, no drainage   Throat:   Lips, teeth and gums normal   Neck:   Supple, symmetrical, trachea midline, no carotid    bruit or JVD   Lungs:     Clear to auscultation bilaterally, respirations unlabored   Chest Wall:    No tenderness or deformity    Heart:    Regular rate and rhythm, S1 and S2 normal, no murmur, rub   or gallop, normal carotid impulse bilaterally without bruit.   Abdomen:     Soft, non-tender   Extremities:   Extremities normal, atraumatic, no cyanosis or edema   Pulses:   2+ and symmetric all extremities   Skin:   Skin color, texture, turgor normal, no rashes or lesions       Diagnostic Data:  Procedures  Lab Results   Component Value Date    CHLPL 206 (H) 05/26/2016    TRIG 178 (H) 01/25/2018    HDL 59 01/25/2018     Lab Results   Component Value Date    GLUCOSE 102 (H) 04/20/2018    BUN 12 04/20/2018    CREATININE 0.80 04/20/2018     04/20/2018    K 3.7 04/20/2018     04/20/2018    CO2 25.0 (L) 04/20/2018     Lab Results   Component Value Date    HGBA1C 5.2 01/21/2016     Lab Results   Component Value Date    WBC 7.04 04/20/2018    HGB 12.8 04/20/2018    HCT 38.6 04/20/2018     04/20/2018       Allergies  Allergies   Allergen Reactions   • Crestor [Rosuvastatin Calcium] Myalgia   • Lipitor [Atorvastatin] Myalgia   • Pravachol [Pravastatin Sodium] Myalgia "   • Statins    • Zocor [Simvastatin] Myalgia   • Aspirin Rash   • Bactrim [Sulfamethoxazole-Trimethoprim] Rash       Current Medications    Current Outpatient Prescriptions:   •  albuterol (PROVENTIL HFA;VENTOLIN HFA) 108 (90 Base) MCG/ACT inhaler, Inhale 2 puffs Every 4 (Four) Hours As Needed for Shortness of Air., Disp: 1 inhaler, Rfl: 0  •  aspirin 81 MG tablet, Take 1 tablet by mouth daily., Disp: , Rfl:   •  cinacalcet (SENSIPAR) 60 MG tablet, Take 1 tablet by mouth daily., Disp: , Rfl:   •  denosumab (PROLIA) 60 MG/ML solution syringe, Inject 60 mg under the skin 1 (One) Time. Yearly, Disp: , Rfl:   •  EDARBI 40 MG tablet, TAKE ONE TABLET BY MOUTH EVERY DAY, Disp: 30 tablet, Rfl: 6  •  Krill Oil 300 MG capsule, Take 1 capsule by mouth daily., Disp: , Rfl:   •  levothyroxine (SYNTHROID, LEVOTHROID) 88 MCG tablet, TAKE 1 TABLET BY MOUTH ONE TIME A DAY , Disp: 90 tablet, Rfl: 0  •  LIVALO 2 MG tablet tablet, TAKE 1 TABLET BY MOUTH ONE TIME A DAY , Disp: 90 tablet, Rfl: 0  •  magnesium oxide (MAG-OX) 400 MG tablet, Take 400 mg by mouth daily., Disp: , Rfl:   •  metoprolol succinate XL (TOPROL-XL) 25 MG 24 hr tablet, Take 1 tablet by mouth 2 (Two) Times a Day. (Patient taking differently: Take 25 mg by mouth Daily.), Disp: 60 tablet, Rfl: 6  •  Multiple Vitamins-Minerals (OCUVITE PO), Take 1 tablet by mouth Daily., Disp: , Rfl:   •  NIFEdipine XL (PROCARDIA XL) 60 MG 24 hr tablet, Take 1 tablet by mouth Daily. 200001, Disp: 30 tablet, Rfl: 10  •  nitroglycerin (NITROSTAT) 0.4 MG SL tablet, Place 0.4 mg under the tongue Every 5 (Five) Minutes As Needed for Chest Pain. Take no more than 3 doses in 15 minutes., Disp: , Rfl:   •  oxybutynin XL (DITROPAN-XL) 5 MG 24 hr tablet, Take 5 mg by mouth Daily. 200001, Disp: , Rfl: 5  •  PREMARIN 0.625 MG/GM vaginal cream, , Disp: , Rfl:           ROS  Review of Systems   Constitution: Positive for weakness.   Cardiovascular: Positive for chest pain, dyspnea on exertion and leg  swelling. Negative for palpitations.   Respiratory: Positive for cough, shortness of breath, snoring and sputum production.        SOCIAL HX  Social History     Social History   • Marital status:      Spouse name: N/A   • Number of children: N/A   • Years of education: N/A     Occupational History   • Not on file.     Social History Main Topics   • Smoking status: Never Smoker   • Smokeless tobacco: Never Used   • Alcohol use No   • Drug use: No   • Sexual activity: Defer     Other Topics Concern   • Not on file     Social History Narrative   • No narrative on file       FAMILY HX  Family History   Problem Relation Age of Onset   • Anemia Mother    • Arthritis Mother    • Heart disease Mother    • Hypertension Mother    • Stroke Mother    • Cancer Father      stomach   • Breast cancer Neg Hx    • Ovarian cancer Neg Hx    • Colon cancer Neg Hx    • Endometrial cancer Neg Hx              Rick Qiu III, MD, FACC

## 2018-05-22 RX ORDER — METOPROLOL SUCCINATE 25 MG/1
TABLET, EXTENDED RELEASE ORAL
Qty: 30 TABLET | Refills: 5 | Status: SHIPPED | OUTPATIENT
Start: 2018-05-22 | End: 2018-09-14 | Stop reason: SDUPTHER

## 2018-06-07 ENCOUNTER — TRANSCRIBE ORDERS (OUTPATIENT)
Dept: ADMINISTRATIVE | Facility: HOSPITAL | Age: 78
End: 2018-06-07

## 2018-06-07 DIAGNOSIS — Z12.31 VISIT FOR SCREENING MAMMOGRAM: Primary | ICD-10-CM

## 2018-07-01 RX ORDER — LEVOTHYROXINE SODIUM 88 UG/1
TABLET ORAL
Qty: 90 TABLET | Refills: 0 | Status: SHIPPED | OUTPATIENT
Start: 2018-07-01 | End: 2018-12-01 | Stop reason: SDUPTHER

## 2018-07-01 RX ORDER — PITAVASTATIN CALCIUM 2.09 MG/1
TABLET, FILM COATED ORAL
Qty: 90 TABLET | Refills: 0 | Status: SHIPPED | OUTPATIENT
Start: 2018-07-01 | End: 2018-07-31

## 2018-07-09 ENCOUNTER — HOSPITAL ENCOUNTER (OUTPATIENT)
Dept: MAMMOGRAPHY | Facility: HOSPITAL | Age: 78
Discharge: HOME | End: 2018-07-09
Attending: INTERNAL MEDICINE | Admitting: INTERNAL MEDICINE

## 2018-07-09 DIAGNOSIS — Z12.31 VISIT FOR SCREENING MAMMOGRAM: ICD-10-CM

## 2018-07-09 PROCEDURE — 77067 SCR MAMMO BI INCL CAD: CPT

## 2018-07-09 PROCEDURE — 77067 SCR MAMMO BI INCL CAD: CPT | Performed by: RADIOLOGY

## 2018-07-09 PROCEDURE — 77063 BREAST TOMOSYNTHESIS BI: CPT

## 2018-07-09 PROCEDURE — 77063 BREAST TOMOSYNTHESIS BI: CPT | Performed by: RADIOLOGY

## 2018-07-22 ENCOUNTER — APPOINTMENT (OUTPATIENT)
Dept: GENERAL RADIOLOGY | Facility: HOSPITAL | Age: 78
End: 2018-07-22

## 2018-07-22 ENCOUNTER — APPOINTMENT (OUTPATIENT)
Dept: CT IMAGING | Facility: HOSPITAL | Age: 78
End: 2018-07-22

## 2018-07-22 ENCOUNTER — HOSPITAL ENCOUNTER (EMERGENCY)
Facility: HOSPITAL | Age: 78
Discharge: HOME | End: 2018-07-22
Attending: EMERGENCY MEDICINE | Admitting: EMERGENCY MEDICINE

## 2018-07-22 VITALS
DIASTOLIC BLOOD PRESSURE: 86 MMHG | OXYGEN SATURATION: 97 % | HEART RATE: 70 BPM | BODY MASS INDEX: 26.76 KG/M2 | WEIGHT: 145.4 LBS | TEMPERATURE: 97.5 F | RESPIRATION RATE: 16 BRPM | SYSTOLIC BLOOD PRESSURE: 139 MMHG | HEIGHT: 62 IN

## 2018-07-22 DIAGNOSIS — S80.11XA CONTUSION OF MULTIPLE SITES OF RIGHT LOWER EXTREMITY, INITIAL ENCOUNTER: ICD-10-CM
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
## 2018-07-22 DIAGNOSIS — S60.221A CONTUSION OF RIGHT HAND, INITIAL ENCOUNTER: ICD-10-CM

## 2018-07-22 DIAGNOSIS — S00.83XA CONTUSION OF FACE, INITIAL ENCOUNTER: ICD-10-CM

## 2018-07-22 PROCEDURE — 70450 CT HEAD/BRAIN W/O DYE: CPT

## 2018-07-22 PROCEDURE — 70486 CT MAXILLOFACIAL W/O DYE: CPT

## 2018-07-22 PROCEDURE — 99283 EMERGENCY DEPT VISIT LOW MDM: CPT

## 2018-07-22 PROCEDURE — 73130 X-RAY EXAM OF HAND: CPT

## 2018-07-22 PROCEDURE — 73590 X-RAY EXAM OF LOWER LEG: CPT

## 2018-07-31 ENCOUNTER — OFFICE VISIT (OUTPATIENT)
Dept: INTERNAL MEDICINE | Facility: CLINIC | Age: 78
End: 2018-07-31

## 2018-07-31 VITALS
WEIGHT: 145.7 LBS | HEART RATE: 60 BPM | SYSTOLIC BLOOD PRESSURE: 170 MMHG | OXYGEN SATURATION: 98 % | DIASTOLIC BLOOD PRESSURE: 90 MMHG | BODY MASS INDEX: 26.65 KG/M2

## 2018-07-31 DIAGNOSIS — Z00.00 MEDICARE ANNUAL WELLNESS VISIT, SUBSEQUENT: Primary | ICD-10-CM

## 2018-07-31 PROCEDURE — 90732 PPSV23 VACC 2 YRS+ SUBQ/IM: CPT | Performed by: INTERNAL MEDICINE

## 2018-07-31 PROCEDURE — G0009 ADMIN PNEUMOCOCCAL VACCINE: HCPCS | Performed by: INTERNAL MEDICINE

## 2018-07-31 PROCEDURE — G0439 PPPS, SUBSEQ VISIT: HCPCS | Performed by: INTERNAL MEDICINE

## 2018-07-31 RX ORDER — CLOTRIMAZOLE AND BETAMETHASONE DIPROPIONATE 10; .64 MG/G; MG/G
CREAM TOPICAL
Refills: 1 | COMMUNITY
Start: 2018-06-15 | End: 2018-10-31

## 2018-07-31 NOTE — PROGRESS NOTES
QUICK REFERENCE INFORMATION:  The ABCs of the Annual Wellness Visit    Subsequent Medicare Wellness Visit    HEALTH RISK ASSESSMENT    1940    Recent Hospitalizations:  No hospitalization(s) within the last year..        Current Medical Providers:  Patient Care Team:  Christelle Gamez DO as PCP - General  Christelle Gamez DO as PCP - Family Medicine        Smoking Status:  History   Smoking Status   • Never Smoker   Smokeless Tobacco   • Never Used       Alcohol Consumption:  History   Alcohol Use No       Depression Screen:   PHQ-2/PHQ-9 Depression Screening 7/31/2018   Little interest or pleasure in doing things 0   Feeling down, depressed, or hopeless 0   Total Score 0       Health Habits and Functional and Cognitive Screening:  Functional & Cognitive Status 7/31/2018   Do you have difficulty preparing food and eating? No   Do you have difficulty bathing yourself, getting dressed or grooming yourself? No   Do you have difficulty using the toilet? No   Do you have difficulty moving around from place to place? No   Do you have trouble with steps or getting out of a bed or a chair? No   In the past year have you fallen or experienced a near fall? Yes   Current Diet Well Balanced Diet   Dental Exam -   Eye Exam -   Exercise (times per week) 7 times per week   Current Exercise Activities Include Yard Work   Do you need help using the phone?  No   Are you deaf or do you have serious difficulty hearing?  No   Do you need help with transportation? No   Do you need help shopping? No   Do you need help preparing meals?  No   Do you need help with housework?  No   Do you need help with laundry? No   Do you need help taking your medications? No   Do you need help managing money? No   Do you ever drive or ride in a car without wearing a seat belt? No   Have you felt unusual stress, anger or loneliness in the last month? Yes   Who do you live with? Spouse   If you need help, do you have trouble finding someone  available to you? No   Have you been bothered in the last four weeks by sexual problems? No   Do you have difficulty concentrating, remembering or making decisions? No           Does the patient have evidence of cognitive impairment? No    Aspirin use counseling: Taking ASA appropriately as indicated      Recent Lab Results:  CMP:  Lab Results   Component Value Date     (H) 04/15/2016    BUN 12 04/20/2018    CREATININE 0.80 04/20/2018    EGFRIFNONA 69 04/20/2018    BCR 15.0 04/20/2018     04/20/2018    K 3.7 04/20/2018    CO2 25.0 (L) 04/20/2018    CALCIUM 8.9 04/20/2018    ALBUMIN 4.40 04/20/2018    LABIL2 1.8 04/15/2016    BILITOT 1.0 04/20/2018    ALKPHOS 79 04/20/2018    AST 18 04/20/2018    ALT 28 04/20/2018     Lipid Panel:  Lab Results   Component Value Date    CHOL 218 (H) 01/25/2018    TRIG 178 (H) 01/25/2018    HDL 59 01/25/2018     HbA1c:  Lab Results   Component Value Date    HGBA1C 5.2 01/21/2016       Visual Acuity:  No exam data present    Age-appropriate Screening Schedule:  Refer to the list below for future screening recommendations based on patient's age, sex and/or medical conditions. Orders for these recommended tests are listed in the plan section. The patient has been provided with a written plan.    Health Maintenance   Topic Date Due   • PNEUMOCOCCAL VACCINES (65+ LOW/MEDIUM RISK) (2 of 2 - PPSV23) 07/25/2018   • ZOSTER VACCINE (2 of 2) 02/03/2020 (Originally 9/19/2017)   • INFLUENZA VACCINE  08/01/2018   • LIPID PANEL  01/25/2019   • MAMMOGRAM  07/09/2020   • TDAP/TD VACCINES (2 - Td) 07/25/2027        Subjective   History of Present Illness    Nicolle Chavez is a 77 y.o. female who presents for an Subsequent Wellness Visit.    The following portions of the patient's history were reviewed and updated as appropriate: allergies, current medications, past family history, past medical history, past social history, past surgical history and problem list.    Outpatient  Medications Prior to Visit   Medication Sig Dispense Refill   • aspirin 81 MG tablet Take 1 tablet by mouth daily.     • cinacalcet (SENSIPAR) 60 MG tablet Take 1 tablet by mouth daily.     • denosumab (PROLIA) 60 MG/ML solution syringe Inject 60 mg under the skin 1 (One) Time. Yearly     • EDARBI 40 MG tablet TAKE ONE TABLET BY MOUTH EVERY DAY 30 tablet 6   • ezetimibe (ZETIA) 10 MG tablet Take 1 tablet by mouth Daily. 30 tablet 11   • Krill Oil 300 MG capsule Take 1 capsule by mouth daily.     • levothyroxine (SYNTHROID, LEVOTHROID) 88 MCG tablet TAKE 1 TABLET BY MOUTH ONE TIME A DAY  90 tablet 0   • magnesium oxide (MAG-OX) 400 MG tablet Take 400 mg by mouth daily.     • metoprolol succinate XL (TOPROL-XL) 25 MG 24 hr tablet TAKE 1 TABLET BY MOUTH TWO TIMES A DAY  30 tablet 5   • Multiple Vitamins-Minerals (OCUVITE PO) Take 1 tablet by mouth Daily.     • NIFEdipine XL (PROCARDIA XL) 60 MG 24 hr tablet Take 1 tablet by mouth Daily. 200001 30 tablet 10   • nitroglycerin (NITROSTAT) 0.4 MG SL tablet Place 0.4 mg under the tongue Every 5 (Five) Minutes As Needed for Chest Pain. Take no more than 3 doses in 15 minutes.     • oxybutynin XL (DITROPAN-XL) 5 MG 24 hr tablet Take 5 mg by mouth Daily. 200001 5   • PREMARIN 0.625 MG/GM vaginal cream      • albuterol (PROVENTIL HFA;VENTOLIN HFA) 108 (90 Base) MCG/ACT inhaler Inhale 2 puffs Every 4 (Four) Hours As Needed for Shortness of Air. 1 inhaler 0   • LIVALO 2 MG tablet tablet TAKE 1 TABLET BY MOUTH ONE TIME A DAY  90 tablet 0     No facility-administered medications prior to visit.        Patient Active Problem List   Diagnosis   • Gastroesophageal reflux disease   • Chronic kidney disease   • Colon polyp   • Hyperlipidemia LDL goal <100   • Essential hypertension   • Hypothyroidism   • Dysphagia   • Myalgia and myositis   • Urinary incontinence   • Coronary artery disease   • Parathyroid disorder (CMS/HCC)   • Anxiety   • Gout   • Palpitations       Advance Care  Planning:  has an advance directive - a copy HAS NOT been provided. Have asked the patient to send this to us to add to record.    Identification of Risk Factors:  Risk factors include: weight  and inactivity.    Review of Systems   Constitutional: Negative for activity change, appetite change, chills, diaphoresis, fatigue, fever and unexpected weight change.   HENT: Negative for congestion, ear discharge, ear pain, mouth sores, nosebleeds, sinus pressure, sneezing and sore throat.    Eyes: Negative for pain, discharge and itching.   Respiratory: Negative for cough, chest tightness, shortness of breath and wheezing.    Cardiovascular: Negative for chest pain, palpitations and leg swelling.   Gastrointestinal: Negative for abdominal pain, constipation, diarrhea, nausea and vomiting.   Endocrine: Negative for cold intolerance, heat intolerance, polydipsia and polyphagia.   Genitourinary: Negative for dysuria, flank pain, frequency, hematuria and urgency.   Musculoskeletal: Negative for arthralgias, back pain, gait problem, myalgias, neck pain and neck stiffness.   Skin: Negative for color change, pallor and rash.   Neurological: Negative for seizures, speech difficulty, numbness and headaches.   Psychiatric/Behavioral: Negative for agitation, confusion, decreased concentration and sleep disturbance. The patient is not nervous/anxious.        Compared to one year ago, the patient feels her physical health is the same.  Compared to one year ago, the patient feels her mental health is the same.    Objective     Physical Exam   Constitutional: She appears well-developed.   HENT:   Head: Normocephalic.   Right Ear: External ear normal.   Left Ear: External ear normal.   Nose: Nose normal.   Mouth/Throat: Oropharynx is clear and moist.   Eyes: Pupils are equal, round, and reactive to light. Conjunctivae are normal.   Neck: No JVD present. No thyromegaly present.   Cardiovascular: Normal rate, regular rhythm and normal heart  sounds.  Exam reveals no friction rub.    No murmur heard.  Pulmonary/Chest: Effort normal and breath sounds normal. No respiratory distress. She has no wheezes. She has no rales.   Abdominal: Soft. Bowel sounds are normal. She exhibits no distension. There is no tenderness. There is no guarding.   Musculoskeletal: She exhibits no edema or tenderness.   Lymphadenopathy:     She has no cervical adenopathy.   Neurological: She displays normal reflexes. No cranial nerve deficit.   Skin: No rash noted.   Psychiatric: Her behavior is normal.   Nursing note and vitals reviewed.      Vitals:    07/31/18 1031   BP: 170/90   Pulse: 60   SpO2: 98%   Weight: 66.1 kg (145 lb 11.2 oz)   PainSc:   6   PainLoc: Leg  Comment: whole right side       Patient's Body mass index is 26.65 kg/m². BMI is above normal parameters. Recommendations include: exercise counseling.      Assessment/Plan   Patient Self-Management and Personalized Health Advice  The patient has been provided with information about: diet, exercise and fall prevention and preventive services including:   · Advance directive, Fall Risk assessment done, Fall Risk plan of care done, Pneumococcal vaccine .    Visit Diagnoses:    ICD-10-CM ICD-9-CM   1. Medicare annual wellness visit, subsequent Z00.00 V70.0       No orders of the defined types were placed in this encounter.      Outpatient Encounter Prescriptions as of 7/31/2018   Medication Sig Dispense Refill   • aspirin 81 MG tablet Take 1 tablet by mouth daily.     • cinacalcet (SENSIPAR) 60 MG tablet Take 1 tablet by mouth daily.     • denosumab (PROLIA) 60 MG/ML solution syringe Inject 60 mg under the skin 1 (One) Time. Yearly     • EDARBI 40 MG tablet TAKE ONE TABLET BY MOUTH EVERY DAY 30 tablet 6   • ezetimibe (ZETIA) 10 MG tablet Take 1 tablet by mouth Daily. 30 tablet 11   • Krill Oil 300 MG capsule Take 1 capsule by mouth daily.     • levothyroxine (SYNTHROID, LEVOTHROID) 88 MCG tablet TAKE 1 TABLET BY MOUTH ONE  TIME A DAY  90 tablet 0   • magnesium oxide (MAG-OX) 400 MG tablet Take 400 mg by mouth daily.     • metoprolol succinate XL (TOPROL-XL) 25 MG 24 hr tablet TAKE 1 TABLET BY MOUTH TWO TIMES A DAY  30 tablet 5   • Multiple Vitamins-Minerals (OCUVITE PO) Take 1 tablet by mouth Daily.     • NIFEdipine XL (PROCARDIA XL) 60 MG 24 hr tablet Take 1 tablet by mouth Daily. 200001 30 tablet 10   • nitroglycerin (NITROSTAT) 0.4 MG SL tablet Place 0.4 mg under the tongue Every 5 (Five) Minutes As Needed for Chest Pain. Take no more than 3 doses in 15 minutes.     • oxybutynin XL (DITROPAN-XL) 5 MG 24 hr tablet Take 5 mg by mouth Daily. 200001 5   • PREMARIN 0.625 MG/GM vaginal cream      • albuterol (PROVENTIL HFA;VENTOLIN HFA) 108 (90 Base) MCG/ACT inhaler Inhale 2 puffs Every 4 (Four) Hours As Needed for Shortness of Air. 1 inhaler 0   • clotrimazole-betamethasone (LOTRISONE) 1-0.05 % cream APPLY TWO TIMES A DAY    AS NEEDED TO AFFECTED AREA FOR 7 DAYS  1   • [DISCONTINUED] LIVALO 2 MG tablet tablet TAKE 1 TABLET BY MOUTH ONE TIME A DAY  90 tablet 0     No facility-administered encounter medications on file as of 7/31/2018.        Reviewed use of high risk medication in the elderly: yes  Reviewed for potential of harmful drug interactions in the elderly: yes    Follow Up:  No Follow-up on file.     An After Visit Summary and PPPS with all of these plans were given to the patient.

## 2018-09-14 RX ORDER — METOPROLOL SUCCINATE 25 MG/1
TABLET, EXTENDED RELEASE ORAL
Qty: 60 TABLET | Refills: 2 | Status: SHIPPED | OUTPATIENT
Start: 2018-09-14 | End: 2018-12-18 | Stop reason: SDUPTHER

## 2018-09-29 ENCOUNTER — HOSPITAL ENCOUNTER (EMERGENCY)
Facility: HOSPITAL | Age: 78
Discharge: HOME | End: 2018-09-29
Attending: EMERGENCY MEDICINE | Admitting: EMERGENCY MEDICINE

## 2018-09-29 VITALS
TEMPERATURE: 97.6 F | OXYGEN SATURATION: 96 % | SYSTOLIC BLOOD PRESSURE: 135 MMHG | DIASTOLIC BLOOD PRESSURE: 65 MMHG | RESPIRATION RATE: 18 BRPM | WEIGHT: 145.4 LBS | BODY MASS INDEX: 25.76 KG/M2 | HEART RATE: 62 BPM | HEIGHT: 63 IN

## 2018-09-29 DIAGNOSIS — L30.9 DERMATITIS: Primary | ICD-10-CM

## 2018-09-29 PROCEDURE — 99283 EMERGENCY DEPT VISIT LOW MDM: CPT

## 2018-09-29 RX ORDER — CLOTRIMAZOLE AND BETAMETHASONE DIPROPIONATE 10; .64 MG/G; MG/G
CREAM TOPICAL ONCE
Status: COMPLETED | OUTPATIENT
Start: 2018-09-29 | End: 2018-09-29

## 2018-09-29 RX ADMIN — CLOTRIMAZOLE AND BETAMETHASONE DIPROPIONATE: 10; .5 CREAM TOPICAL at 17:47

## 2018-10-31 ENCOUNTER — OFFICE VISIT (OUTPATIENT)
Dept: INTERNAL MEDICINE | Facility: CLINIC | Age: 78
End: 2018-10-31

## 2018-10-31 VITALS
HEART RATE: 73 BPM | WEIGHT: 143 LBS | BODY MASS INDEX: 25.34 KG/M2 | HEIGHT: 63 IN | OXYGEN SATURATION: 98 % | RESPIRATION RATE: 16 BRPM | DIASTOLIC BLOOD PRESSURE: 72 MMHG | SYSTOLIC BLOOD PRESSURE: 132 MMHG

## 2018-10-31 DIAGNOSIS — I10 ESSENTIAL HYPERTENSION: Primary | ICD-10-CM

## 2018-10-31 DIAGNOSIS — E03.9 ACQUIRED HYPOTHYROIDISM: ICD-10-CM

## 2018-10-31 DIAGNOSIS — I25.10 CORONARY ARTERY DISEASE INVOLVING NATIVE CORONARY ARTERY OF NATIVE HEART WITHOUT ANGINA PECTORIS: ICD-10-CM

## 2018-10-31 DIAGNOSIS — E78.5 HYPERLIPIDEMIA LDL GOAL <100: ICD-10-CM

## 2018-10-31 LAB
ALBUMIN SERPL-MCNC: 4.57 G/DL (ref 3.2–4.8)
ALBUMIN/GLOB SERPL: 3 G/DL (ref 1.5–2.5)
ALP SERPL-CCNC: 82 U/L (ref 25–100)
ALT SERPL W P-5'-P-CCNC: 16 U/L (ref 7–40)
ANION GAP SERPL CALCULATED.3IONS-SCNC: 13 MMOL/L (ref 3–11)
ARTICHOKE IGE QN: 178 MG/DL (ref 0–130)
AST SERPL-CCNC: 19 U/L (ref 0–33)
BILIRUB SERPL-MCNC: 0.5 MG/DL (ref 0.3–1.2)
BUN BLD-MCNC: 13 MG/DL (ref 9–23)
BUN/CREAT SERPL: 14.4 (ref 7–25)
CALCIUM SPEC-SCNC: 10.2 MG/DL (ref 8.7–10.4)
CHLORIDE SERPL-SCNC: 100 MMOL/L (ref 99–109)
CHOLEST SERPL-MCNC: 242 MG/DL (ref 0–200)
CO2 SERPL-SCNC: 26 MMOL/L (ref 20–31)
CREAT BLD-MCNC: 0.9 MG/DL (ref 0.6–1.3)
GFR SERPL CREATININE-BSD FRML MDRD: 60 ML/MIN/1.73
GLOBULIN UR ELPH-MCNC: 1.5 GM/DL
GLUCOSE BLD-MCNC: 85 MG/DL (ref 70–100)
HDLC SERPL-MCNC: 56 MG/DL (ref 40–60)
POTASSIUM BLD-SCNC: 4.2 MMOL/L (ref 3.5–5.5)
PROT SERPL-MCNC: 6.1 G/DL (ref 5.7–8.2)
SODIUM BLD-SCNC: 139 MMOL/L (ref 132–146)
TRIGL SERPL-MCNC: 167 MG/DL (ref 0–150)
TSH SERPL DL<=0.05 MIU/L-ACNC: 2.78 MIU/ML (ref 0.35–5.35)

## 2018-10-31 PROCEDURE — 99213 OFFICE O/P EST LOW 20 MIN: CPT | Performed by: INTERNAL MEDICINE

## 2018-10-31 PROCEDURE — 80061 LIPID PANEL: CPT | Performed by: INTERNAL MEDICINE

## 2018-10-31 PROCEDURE — 84443 ASSAY THYROID STIM HORMONE: CPT | Performed by: INTERNAL MEDICINE

## 2018-10-31 PROCEDURE — 80053 COMPREHEN METABOLIC PANEL: CPT | Performed by: INTERNAL MEDICINE

## 2018-10-31 RX ORDER — NYSTATIN 100000 U/G
CREAM TOPICAL
Refills: 0 | COMMUNITY
Start: 2018-10-01 | End: 2018-10-31

## 2018-10-31 RX ORDER — MOMETASONE FUROATE 1 MG/G
CREAM TOPICAL
Refills: 0 | COMMUNITY
Start: 2018-10-01 | End: 2018-10-31

## 2018-10-31 NOTE — PROGRESS NOTES
Subjective   Nicolle Chavez is a 78 y.o. female.   Chief Complaint   Patient presents with   • Hypertension   • Hypothyroidism   • Hyperlipidemia       Hypertension   This is a chronic problem. The current episode started more than 1 year ago. Pertinent negatives include no chest pain, headaches, neck pain, palpitations or shortness of breath. There are no compliance problems.    Hyperlipidemia   This is a chronic problem. The current episode started more than 1 year ago. Exacerbating diseases include hypothyroidism. Pertinent negatives include no chest pain, myalgias or shortness of breath. There are no compliance problems.    Hypothyroidism   This is a chronic problem. Pertinent negatives include no abdominal pain, arthralgias, chest pain, chills, congestion, coughing, diaphoresis, fatigue, fever, headaches, myalgias, nausea, neck pain, numbness, rash, sore throat or vomiting.    CAD that is stable.     The following portions of the patient's history were reviewed and updated as appropriate: allergies, current medications, past family history, past medical history, past social history, past surgical history and problem list.    Review of Systems   Constitutional: Negative for activity change, appetite change, chills, diaphoresis, fatigue, fever and unexpected weight change.   HENT: Negative for congestion, ear discharge, ear pain, mouth sores, nosebleeds, sinus pressure, sneezing and sore throat.    Eyes: Negative for pain, discharge and itching.   Respiratory: Negative for cough, chest tightness, shortness of breath and wheezing.    Cardiovascular: Negative for chest pain, palpitations and leg swelling.   Gastrointestinal: Negative for abdominal pain, constipation, diarrhea, nausea and vomiting.   Endocrine: Negative for cold intolerance, heat intolerance, polydipsia and polyphagia.   Genitourinary: Negative for dysuria, flank pain, frequency, hematuria and urgency.   Musculoskeletal: Negative for  "arthralgias, back pain, gait problem, myalgias, neck pain and neck stiffness.   Skin: Negative for color change, pallor and rash.   Neurological: Negative for seizures, speech difficulty, numbness and headaches.   Psychiatric/Behavioral: Negative for agitation, confusion, decreased concentration and sleep disturbance. The patient is not nervous/anxious.        /72   Pulse 73   Resp 16   Ht 160 cm (63\")   Wt 64.9 kg (143 lb)   SpO2 98%   BMI 25.33 kg/m²     Objective   Physical Exam   Constitutional: She appears well-developed.   HENT:   Head: Normocephalic.   Right Ear: External ear normal.   Left Ear: External ear normal.   Nose: Nose normal.   Mouth/Throat: Oropharynx is clear and moist.   Eyes: Pupils are equal, round, and reactive to light. Conjunctivae are normal.   Neck: No JVD present. No thyromegaly present.   Cardiovascular: Normal rate, regular rhythm and normal heart sounds.  Exam reveals no friction rub.    No murmur heard.  Pulmonary/Chest: Effort normal and breath sounds normal. No respiratory distress. She has no wheezes. She has no rales.   Abdominal: Soft. Bowel sounds are normal. She exhibits no distension. There is no tenderness. There is no guarding.   Musculoskeletal: She exhibits no edema or tenderness.   Lymphadenopathy:     She has no cervical adenopathy.   Neurological: She displays normal reflexes. No cranial nerve deficit.   Skin: Skin is warm and dry. No rash noted.   Psychiatric: She has a normal mood and affect. Her behavior is normal.   Nursing note and vitals reviewed.      Assessment/Plan   Nicolle was seen today for hypertension, hyperlipidemia, hypothyroidism and heartburn.    Diagnoses and all orders for this visit:    Essential hypertension  Stable cmp, lipids, urine micro    Other specified hypothyroidism  stable  Hyperlipidemia  Statins not tolerated. Now on Zetia    CAD  stable       "

## 2018-11-14 ENCOUNTER — OFFICE VISIT (OUTPATIENT)
Dept: CARDIOLOGY | Facility: CLINIC | Age: 78
End: 2018-11-14

## 2018-11-14 VITALS
DIASTOLIC BLOOD PRESSURE: 74 MMHG | SYSTOLIC BLOOD PRESSURE: 122 MMHG | WEIGHT: 142 LBS | OXYGEN SATURATION: 96 % | BODY MASS INDEX: 26.13 KG/M2 | HEART RATE: 65 BPM | HEIGHT: 62 IN

## 2018-11-14 DIAGNOSIS — I25.10 CORONARY ARTERY DISEASE INVOLVING NATIVE CORONARY ARTERY OF NATIVE HEART WITHOUT ANGINA PECTORIS: Primary | ICD-10-CM

## 2018-11-14 DIAGNOSIS — I10 ESSENTIAL HYPERTENSION: ICD-10-CM

## 2018-11-14 DIAGNOSIS — E78.5 HYPERLIPIDEMIA LDL GOAL <100: ICD-10-CM

## 2018-11-14 PROCEDURE — 99214 OFFICE O/P EST MOD 30 MIN: CPT | Performed by: INTERNAL MEDICINE

## 2018-11-14 RX ORDER — UBIDECARENONE 100 MG
100 CAPSULE ORAL DAILY
COMMUNITY
End: 2019-02-28

## 2018-11-14 NOTE — PROGRESS NOTES
[facility] at [facility]  Office visit  Nicolle Chavez  1940  [phone]    VISIT DATE:  09/25/2017    PCP: Christelle Gamez,   [address]  [facility] [address]    CC:  Chief Complaint   Patient presents with   • Hypertension   • Hyperlipidemia       PROBLEM LIST:  1. Coronary artery disease:  a. Field ST-elevation myocardial infarction, 11/25/2015, with associated chest pain, jaw pain, diaphoresis, and dyspnea; inferior-anterior ST elevation by outside telemetry strips.   b. Emergent cardiac catheterization, Dr. Isael Rosenbaum: Status post drug-eluting stent to the proximal right coronary artery using a 2.25 x 23 mm Xience Alpine stent.   c. Ejection fraction 55% to 60%.   2. Uncontrolled Essential HTN.  3. Dyspnea.   4. Mixed Dyslipidemia.   5. Hypothyroidism, on chronic supplementation.   6. 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   7. Lower extremity edema.   8. History of peptic ulcer disease and bleeding ulcer approximately 12 years ago; incomplete database.   9. Family history of coronary disease.   10. Anxiety.   11. Gout.   12. Surgical history:  a. Hysterectomy.   b. Left parathyroidectomy.  c. Ulcer repair.           ASSESSMENT:   Diagnosis Plan   1. Coronary artery disease involving native coronary artery of native heart without angina pectoris     2. Essential hypertension     3. Hyperlipidemia LDL goal <100         PLAN:  Coronary artery disease: Stable.  Continue aspirin, beta blockade, statin.    Hyperlipidemia: Continue Livalo.  Goal LDL less than 70, continue Zetia 10 mg by mouth daily.  Starting preauthorization for PCSK9 inhibitor.  Control is currently suboptimal.    Hypertension: Stable well-controlled.  Continue current medical therapy, goal less than 140/90.    Subjective  Developed myalgias on atorvastatin.  Reviewed most recent fasting lipid panel.  Denies chest pain.  Blood pressures have been running less  than 140/90 mmHg.  denies easy bruising on aspirin therapy..  Blood pressures running less than 130/80 mmHg.  Maintaining an active lifestyle.  Compliant with medical therapy.    PHYSICAL EXAMINATION:  There were no vitals filed for this visit.  General Appearance:    Alert, cooperative, no distress, appears stated age   Head:    Normocephalic, without obvious abnormality, atraumatic   Eyes:    conjunctiva/corneas clear   Nose:   Nares normal, septum midline, mucosa normal, no drainage   Throat:   Lips, teeth and gums normal   Neck:   Supple, symmetrical, trachea midline, no carotid    bruit or JVD   Lungs:     Clear to auscultation bilaterally, respirations unlabored   Chest Wall:    No tenderness or deformity    Heart:    Regular rate and rhythm, S1 and S2 normal, no murmur, rub   or gallop, normal carotid impulse bilaterally without bruit.   Abdomen:     Soft, non-tender   Extremities:   Extremities normal, atraumatic, no cyanosis or edema   Pulses:   2+ and symmetric all extremities   Skin:   Skin color, texture, turgor normal, no rashes or lesions       Diagnostic Data:  Procedures  Lab Results   Component Value Date    CHLPL 206 (H) 05/26/2016    TRIG 167 (H) 10/31/2018    HDL 56 10/31/2018     Lab Results   Component Value Date    GLUCOSE 85 10/31/2018    BUN 13 10/31/2018    CREATININE 0.90 10/31/2018     10/31/2018    K 4.2 10/31/2018     10/31/2018    CO2 26.0 10/31/2018     Lab Results   Component Value Date    HGBA1C 5.2 01/21/2016     Lab Results   Component Value Date    WBC 7.04 04/20/2018    HGB 12.8 04/20/2018    HCT 38.6 04/20/2018     04/20/2018       Allergies  Allergies   Allergen Reactions   • Crestor [Rosuvastatin Calcium] Myalgia   • Lipitor [Atorvastatin] Myalgia   • Pravachol [Pravastatin Sodium] Myalgia   • Statins    • Zocor [Simvastatin] Myalgia   • Aspirin Rash   • Bactrim [Sulfamethoxazole-Trimethoprim] Rash       Current Medications    Current Outpatient  Medications:   •  albuterol (PROVENTIL HFA;VENTOLIN HFA) 108 (90 Base) MCG/ACT inhaler, Inhale 2 puffs Every 4 (Four) Hours As Needed for Shortness of Air., Disp: 1 inhaler, Rfl: 0  •  aspirin 81 MG tablet, Take 1 tablet by mouth daily., Disp: , Rfl:   •  cinacalcet (SENSIPAR) 60 MG tablet, Take 1 tablet by mouth daily., Disp: , Rfl:   •  EDARBI 40 MG tablet, TAKE ONE TABLET BY MOUTH EVERY DAY, Disp: 30 tablet, Rfl: 6  •  ezetimibe (ZETIA) 10 MG tablet, Take 1 tablet by mouth Daily., Disp: 30 tablet, Rfl: 11  •  Krill Oil 300 MG capsule, Take 1 capsule by mouth daily., Disp: , Rfl:   •  levothyroxine (SYNTHROID, LEVOTHROID) 88 MCG tablet, TAKE 1 TABLET BY MOUTH ONE TIME A DAY , Disp: 90 tablet, Rfl: 0  •  magnesium oxide (MAG-OX) 400 MG tablet, Take 400 mg by mouth daily., Disp: , Rfl:   •  metoprolol succinate XL (TOPROL-XL) 25 MG 24 hr tablet, TAKE 1 TABLET BY MOUTH TWO TIMES A DAY , Disp: 60 tablet, Rfl: 2  •  NIFEdipine XL (PROCARDIA XL) 60 MG 24 hr tablet, Take 1 tablet by mouth Daily. 200001, Disp: 30 tablet, Rfl: 10  •  nitroglycerin (NITROSTAT) 0.4 MG SL tablet, Place 0.4 mg under the tongue Every 5 (Five) Minutes As Needed for Chest Pain. Take no more than 3 doses in 15 minutes., Disp: , Rfl:   •  Oxyquinolone Sulfate (TRIMO-MORTENSEN) 0.025 % gel, Insert  into the vagina., Disp: , Rfl:   •  PREMARIN 0.625 MG/GM vaginal cream, , Disp: , Rfl:           ROS  Review of Systems   Cardiovascular: Negative for chest pain, dyspnea on exertion, leg swelling and palpitations.   Respiratory: Negative for cough, shortness of breath, snoring and sputum production.        SOCIAL HX  Social History     Socioeconomic History   • Marital status:      Spouse name: Not on file   • Number of children: Not on file   • Years of education: Not on file   • Highest education level: Not on file   Social Needs   • Financial resource strain: Not on file   • Food insecurity - worry: Not on file   • Food insecurity - inability: Not on  file   • Transportation needs - medical: Not on file   • Transportation needs - non-medical: Not on file   Occupational History   • Not on file   Tobacco Use   • Smoking status: Never Smoker   • Smokeless tobacco: Never Used   Substance and Sexual Activity   • Alcohol use: No   • Drug use: No   • Sexual activity: Defer   Other Topics Concern   • Not on file   Social History Narrative   • Not on file       FAMILY HX  Family History   Problem Relation Age of Onset   • Anemia Mother    • Arthritis Mother    • Heart disease Mother    • Hypertension Mother    • Stroke Mother    • Cancer Father         stomach   • Breast cancer Neg Hx    • Ovarian cancer Neg Hx    • Colon cancer Neg Hx    • Endometrial cancer Neg Hx              Rick Qiu III, MD, FACC

## 2018-11-30 ENCOUNTER — PRIOR AUTHORIZATION (OUTPATIENT)
Dept: CARDIOLOGY | Facility: CLINIC | Age: 78
End: 2018-11-30

## 2018-11-30 NOTE — TELEPHONE ENCOUNTER
Received paperwork from [facility] [employer] that patient has been approved for Repatha from 10/29/18 until 11/28/2021. Shipment to be sent on 12/4/18. Patient notified of approval. She stated the Pharmacist went over everything in detail. Instructed her to call if she had any questions. She verbalized understanding. Approval letter sent to medical records to be scanned.

## 2018-12-01 RX ORDER — LEVOTHYROXINE SODIUM 88 UG/1
TABLET ORAL
Qty: 90 TABLET | Refills: 0 | Status: SHIPPED | OUTPATIENT
Start: 2018-12-01 | End: 2019-02-22 | Stop reason: SDUPTHER

## 2018-12-18 RX ORDER — METOPROLOL SUCCINATE 25 MG/1
TABLET, EXTENDED RELEASE ORAL
Qty: 60 TABLET | Refills: 1 | Status: SHIPPED | OUTPATIENT
Start: 2018-12-18 | End: 2019-02-22 | Stop reason: SDUPTHER

## 2019-01-21 RX ORDER — AZILSARTAN KAMEDOXOMIL 40 MG/1
TABLET ORAL
Qty: 30 TABLET | Refills: 6 | Status: SHIPPED | OUTPATIENT
Start: 2019-01-21 | End: 2019-05-20 | Stop reason: SDUPTHER

## 2019-02-22 RX ORDER — LEVOTHYROXINE SODIUM 88 UG/1
TABLET ORAL
Qty: 90 TABLET | Refills: 0 | Status: SHIPPED | OUTPATIENT
Start: 2019-02-22 | End: 2019-06-04 | Stop reason: SDUPTHER

## 2019-02-22 RX ORDER — METOPROLOL SUCCINATE 25 MG/1
TABLET, EXTENDED RELEASE ORAL
Qty: 60 TABLET | Refills: 1 | Status: SHIPPED | OUTPATIENT
Start: 2019-02-22 | End: 2019-04-28 | Stop reason: SDUPTHER

## 2019-02-27 RX ORDER — LEVOTHYROXINE SODIUM 88 UG/1
TABLET ORAL
Qty: 90 TABLET | Refills: 0 | OUTPATIENT
Start: 2019-02-27

## 2019-02-28 ENCOUNTER — OFFICE VISIT (OUTPATIENT)
Dept: INTERNAL MEDICINE | Facility: CLINIC | Age: 79
End: 2019-02-28

## 2019-02-28 VITALS
OXYGEN SATURATION: 98 % | SYSTOLIC BLOOD PRESSURE: 120 MMHG | BODY MASS INDEX: 26.41 KG/M2 | HEART RATE: 64 BPM | DIASTOLIC BLOOD PRESSURE: 80 MMHG | WEIGHT: 144.4 LBS

## 2019-02-28 DIAGNOSIS — R10.9 FLANK PAIN, ACUTE: ICD-10-CM

## 2019-02-28 DIAGNOSIS — I10 ESSENTIAL HYPERTENSION: Primary | ICD-10-CM

## 2019-02-28 DIAGNOSIS — E03.9 ACQUIRED HYPOTHYROIDISM: ICD-10-CM

## 2019-02-28 DIAGNOSIS — I25.10 CORONARY ARTERY DISEASE INVOLVING NATIVE CORONARY ARTERY OF NATIVE HEART WITHOUT ANGINA PECTORIS: ICD-10-CM

## 2019-02-28 DIAGNOSIS — E78.5 HYPERLIPIDEMIA LDL GOAL <100: ICD-10-CM

## 2019-02-28 LAB
BILIRUB BLD-MCNC: NEGATIVE MG/DL
CLARITY, POC: CLEAR
COLOR UR: YELLOW
GLUCOSE UR STRIP-MCNC: NEGATIVE MG/DL
KETONES UR QL: NEGATIVE
LEUKOCYTE EST, POC: NEGATIVE
NITRITE UR-MCNC: NEGATIVE MG/ML
PH UR: 5 [PH] (ref 5–8)
PROT UR STRIP-MCNC: NEGATIVE MG/DL
RBC # UR STRIP: NEGATIVE /UL
SP GR UR: 1.02 (ref 1–1.03)
UROBILINOGEN UR QL: NORMAL

## 2019-02-28 PROCEDURE — 99213 OFFICE O/P EST LOW 20 MIN: CPT | Performed by: INTERNAL MEDICINE

## 2019-02-28 PROCEDURE — 81003 URINALYSIS AUTO W/O SCOPE: CPT | Performed by: INTERNAL MEDICINE

## 2019-03-19 ENCOUNTER — TELEPHONE (OUTPATIENT)
Dept: INTERNAL MEDICINE | Facility: CLINIC | Age: 79
End: 2019-03-19

## 2019-03-19 NOTE — TELEPHONE ENCOUNTER
PT CALLED TO SAY THAT SHE WAS SUPPOSED TO HAVE LABS DRAWN IN [place] THERE IS AN ORDER IN CHART IS THIS ALL SHE NEEDS TO HAVE DRAWN AND SHE WILL BE GOING TO THE LAB IN [place]      PLEASE LET THE PT KNOW  [phone]

## 2019-03-20 ENCOUNTER — TELEPHONE (OUTPATIENT)
Dept: INTERNAL MEDICINE | Facility: CLINIC | Age: 79
End: 2019-03-20

## 2019-03-20 NOTE — TELEPHONE ENCOUNTER
[facility] CALLED AND STATED THAT PT. IS THERE AND IS NEEDING LAB ORDERS BUT NOT SURE WHICH ONES; PLEASE CALL REYES AT [facility] AT [phone]

## 2019-03-21 NOTE — TELEPHONE ENCOUNTER
lvm for pt that I had got the message after she had already been to get labs, just wondering if she got them

## 2019-03-22 ENCOUNTER — TELEPHONE (OUTPATIENT)
Dept: INTERNAL MEDICINE | Facility: CLINIC | Age: 79
End: 2019-03-22

## 2019-03-22 NOTE — TELEPHONE ENCOUNTER
PT CALLED TO GIVE FAX NUMBER FOR THE [facility] IN [place]. SAID YOU'VE BEEN TRYING TO GET LAB ORDERS OVER THERE AND THEY HAVEN'T RECEIVED THEM.    FAX #: [phone]  ATTN: [facility] STAFF    [facility] [place] #:[phone]

## 2019-03-25 LAB
ALBUMIN SERPL-MCNC: 4.4 G/DL (ref 3.5–5)
ALBUMIN/GLOB SERPL: 2 G/DL (ref 1–2)
ALP SERPL-CCNC: 98 U/L (ref 38–126)
ALT SERPL-CCNC: 25 U/L (ref 13–69)
AST SERPL-CCNC: 20 U/L (ref 15–46)
BILIRUB SERPL-MCNC: 0.6 MG/DL (ref 0.2–1.3)
BUN SERPL-MCNC: 14 MG/DL (ref 7–20)
BUN/CREAT SERPL: 17.5 (ref 7.1–23.5)
CALCIUM SERPL-MCNC: 9.3 MG/DL (ref 8.4–10.2)
CHLORIDE SERPL-SCNC: 105 MMOL/L (ref 98–107)
CHOLEST SERPL-MCNC: 190 MG/DL (ref 0–199)
CO2 SERPL-SCNC: 29 MMOL/L (ref 26–30)
CREAT SERPL-MCNC: 0.8 MG/DL (ref 0.6–1.3)
GLOBULIN SER CALC-MCNC: 2.2 GM/DL
GLUCOSE SERPL-MCNC: 88 MG/DL (ref 74–98)
HDLC SERPL-MCNC: 64 MG/DL (ref 40–60)
LDLC SERPL CALC-MCNC: 90 MG/DL (ref 0–99)
POTASSIUM SERPL-SCNC: 4.8 MMOL/L (ref 3.5–5.1)
PROT SERPL-MCNC: 6.6 G/DL (ref 6.3–8.2)
SODIUM SERPL-SCNC: 139 MMOL/L (ref 137–145)
TRIGL SERPL-MCNC: 178 MG/DL
TSH SERPL DL<=0.005 MIU/L-ACNC: 3.08 MIU/ML (ref 0.47–4.68)
VLDLC SERPL CALC-MCNC: 35.6 MG/DL

## 2019-04-28 DIAGNOSIS — I10 ESSENTIAL HYPERTENSION: ICD-10-CM

## 2019-04-29 RX ORDER — METOPROLOL SUCCINATE 25 MG/1
TABLET, EXTENDED RELEASE ORAL
Qty: 60 TABLET | Refills: 3 | Status: SHIPPED | OUTPATIENT
Start: 2019-04-29 | End: 2019-06-05 | Stop reason: SDUPTHER

## 2019-04-29 RX ORDER — NIFEDIPINE 60 MG/1
TABLET, EXTENDED RELEASE ORAL
Qty: 30 TABLET | Refills: 5 | Status: SHIPPED | OUTPATIENT
Start: 2019-04-29 | End: 2019-06-05 | Stop reason: SDUPTHER

## 2019-05-20 ENCOUNTER — OFFICE VISIT (OUTPATIENT)
Dept: CARDIOLOGY | Facility: CLINIC | Age: 79
End: 2019-05-20

## 2019-05-20 VITALS
SYSTOLIC BLOOD PRESSURE: 150 MMHG | HEIGHT: 62 IN | BODY MASS INDEX: 26.68 KG/M2 | OXYGEN SATURATION: 97 % | WEIGHT: 145 LBS | DIASTOLIC BLOOD PRESSURE: 90 MMHG | HEART RATE: 63 BPM

## 2019-05-20 DIAGNOSIS — E78.5 HYPERLIPIDEMIA LDL GOAL <100: ICD-10-CM

## 2019-05-20 DIAGNOSIS — I10 ESSENTIAL HYPERTENSION: ICD-10-CM

## 2019-05-20 DIAGNOSIS — I25.10 CORONARY ARTERY DISEASE INVOLVING NATIVE CORONARY ARTERY OF NATIVE HEART WITHOUT ANGINA PECTORIS: Primary | ICD-10-CM

## 2019-05-20 PROCEDURE — 99214 OFFICE O/P EST MOD 30 MIN: CPT | Performed by: INTERNAL MEDICINE

## 2019-05-20 NOTE — PROGRESS NOTES
[facility] at [facility]  Office visit  Nicolle Chavez  1940  [phone]    VISIT DATE:  5/20/2019      PCP: Christelle Gamez,   [address]  [facility] [address]    CC:  Chief Complaint   Patient presents with   • Coronary Artery Disease       PROBLEM LIST:  1. Coronary artery disease:  a. Field ST-elevation myocardial infarction, 11/25/2015, with associated chest pain, jaw pain, diaphoresis, and dyspnea; inferior-anterior ST elevation by outside telemetry strips.   b. Emergent cardiac catheterization, Dr. Isael Rosenbaum: Status post drug-eluting stent to the proximal right coronary artery using a 2.25 x 23 mm Xience Alpine stent.   c. Ejection fraction 55% to 60%.   2. Uncontrolled Essential HTN.  3. Dyspnea.   4. Mixed Dyslipidemia.   5. Hypothyroidism, on chronic supplementation.   6. 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   7. Lower extremity edema.   8. History of peptic ulcer disease and bleeding ulcer approximately 12 years ago; incomplete database.   9. Family history of coronary disease.   10. Anxiety.   11. Gout.   12. Surgical history:  a. Hysterectomy.   b. Left parathyroidectomy.  c. Ulcer repair.           ASSESSMENT:   Diagnosis Plan   1. Coronary artery disease involving native coronary artery of native heart without angina pectoris     2. Essential hypertension     3. Hyperlipidemia LDL goal <100  Lipid Panel    Comprehensive Metabolic Panel       PLAN:  Coronary artery disease: Stable.  Continue aspirin, beta blockade, statin.    Hyperlipidemia: Significant improvement in overall cholesterol profile after initiation of PCSK9 inhibitor.  Continue current medical therapy.    Hypertension: Stable well-controlled.  Continue current medical therapy, goal less than 140/90.    Subjective  Developed myalgias on atorvastatin.  Reviewed most recent fasting lipid panel, excellent response to PCSK9 inhibition.  Denies chest  "pain.  Blood pressures have been running less than 140/90 mmHg.  denies easy bruising on aspirin therapy..  Blood pressures running less than 130/80 mmHg.  Maintaining an active lifestyle, walks her dog on a regular basis.  Compliant with medical therapy.  Mild increase in bilateral lower extremity edema over the previous 2 months which is not significant affecting quality of life.  Marly is now costing her $88, monthly co-pay went up $10 on Edarbi as well.    PHYSICAL EXAMINATION:  Vitals:    05/20/19 0912   BP: 150/90   BP Location: Right arm   Patient Position: Sitting   Pulse: 63   SpO2: 97%   Weight: 65.8 kg (145 lb)   Height: 157.5 cm (62\")     General Appearance:    Alert, cooperative, no distress, appears stated age   Head:    Normocephalic, without obvious abnormality, atraumatic   Eyes:    conjunctiva/corneas clear   Nose:   Nares normal, septum midline, mucosa normal, no drainage   Throat:   Lips, teeth and gums normal   Neck:   Supple, symmetrical, trachea midline, no carotid    bruit or JVD   Lungs:     Clear to auscultation bilaterally, respirations unlabored   Chest Wall:    No tenderness or deformity    Heart:    Regular rate and rhythm, S1 and S2 normal, no murmur, rub   or gallop, normal carotid impulse bilaterally without bruit.   Abdomen:     Soft, non-tender   Extremities:   Extremities normal, atraumatic, no cyanosis or edema   Pulses:   2+ and symmetric all extremities   Skin:   Skin color, texture, turgor normal, no rashes or lesions       Diagnostic Data:  Procedures  Lab Results   Component Value Date    CHLPL 190 03/25/2019    TRIG 178 (H) 03/25/2019    HDL 64 (H) 03/25/2019     Lab Results   Component Value Date    GLUCOSE 85 10/31/2018    BUN 14 03/25/2019    CREATININE 0.80 03/25/2019     03/25/2019    K 4.8 03/25/2019     03/25/2019    CO2 29.0 03/25/2019     Lab Results   Component Value Date    HGBA1C 5.2 01/21/2016     Lab Results   Component Value Date    WBC 7.04 " 04/20/2018    HGB 12.8 04/20/2018    HCT 38.6 04/20/2018     04/20/2018       Allergies  Allergies   Allergen Reactions   • Crestor [Rosuvastatin Calcium] Myalgia   • Lipitor [Atorvastatin] Myalgia   • Pravachol [Pravastatin Sodium] Myalgia   • Statins    • Zocor [Simvastatin] Myalgia   • Aspirin Rash   • Bactrim [Sulfamethoxazole-Trimethoprim] Rash       Current Medications    Current Outpatient Medications:   •  aspirin 81 MG tablet, Take 1 tablet by mouth daily., Disp: , Rfl:   •  Azilsartan Medoxomil (EDARBI) 40 MG tablet, Take 40 mg by mouth Daily., Disp: 90 tablet, Rfl: 1  •  cinacalcet (SENSIPAR) 60 MG tablet, Take 1 tablet by mouth daily., Disp: , Rfl:   •  Denosumab (PROLIA SC), Inject  under the skin into the appropriate area as directed., Disp: , Rfl:   •  Evolocumab 140 MG/ML solution auto-injector, Inject 1 mL under the skin into the appropriate area as directed Every 14 (Fourteen) Days., Disp: 2 pen, Rfl: 11  •  levothyroxine (SYNTHROID, LEVOTHROID) 88 MCG tablet, TAKE 1 TABLET BY MOUTH ONE TIME A DAY , Disp: 90 tablet, Rfl: 0  •  metoprolol succinate XL (TOPROL-XL) 25 MG 24 hr tablet, TAKE 1 TABLET BY MOUTH TWO TIMES A DAY , Disp: 60 tablet, Rfl: 3  •  Multiple Vitamins-Minerals (PRESERVISION AREDS 2 PO), Take  by mouth 2 (Two) Times a Day., Disp: , Rfl:   •  NIFEdipine XL (PROCARDIA XL) 60 MG 24 hr tablet, TAKE 1 TABLET BY MOUTH ONE TIME A DAY , Disp: 30 tablet, Rfl: 5  •  nitroglycerin (NITROSTAT) 0.4 MG SL tablet, Place 0.4 mg under the tongue Every 5 (Five) Minutes As Needed for Chest Pain. Take no more than 3 doses in 15 minutes., Disp: , Rfl:   •  Oxyquinolone Sulfate (TRIMO-MORTENSEN) 0.025 % gel, Insert  into the vagina., Disp: , Rfl:   •  PREMARIN 0.625 MG/GM vaginal cream, 1 (One) Time Per Week., Disp: , Rfl:           ROS  Review of Systems   Cardiovascular: Positive for chest pain, dyspnea on exertion, leg swelling and palpitations.   Respiratory: Positive for shortness of breath.  Negative for cough, snoring and sputum production.        SOCIAL HX  Social History     Socioeconomic History   • Marital status:      Spouse name: Not on file   • Number of children: Not on file   • Years of education: Not on file   • Highest education level: Not on file   Tobacco Use   • Smoking status: Never Smoker   • Smokeless tobacco: Never Used   Substance and Sexual Activity   • Alcohol use: No   • Drug use: No   • Sexual activity: Defer       FAMILY HX  Family History   Problem Relation Age of Onset   • Anemia Mother    • Arthritis Mother    • Heart disease Mother    • Hypertension Mother    • Stroke Mother    • Cancer Father         stomach   • Breast cancer Neg Hx    • Ovarian cancer Neg Hx    • Colon cancer Neg Hx    • Endometrial cancer Neg Hx              Rick Qiu III, MD, FACC

## 2019-05-28 ENCOUNTER — OFFICE VISIT (OUTPATIENT)
Dept: INTERNAL MEDICINE | Facility: CLINIC | Age: 79
End: 2019-05-28

## 2019-05-28 VITALS
WEIGHT: 143.6 LBS | OXYGEN SATURATION: 98 % | SYSTOLIC BLOOD PRESSURE: 140 MMHG | DIASTOLIC BLOOD PRESSURE: 80 MMHG | HEART RATE: 68 BPM | BODY MASS INDEX: 26.26 KG/M2

## 2019-05-28 DIAGNOSIS — K21.9 GASTROESOPHAGEAL REFLUX DISEASE, ESOPHAGITIS PRESENCE NOT SPECIFIED: ICD-10-CM

## 2019-05-28 DIAGNOSIS — I25.10 CORONARY ARTERY DISEASE INVOLVING NATIVE CORONARY ARTERY OF NATIVE HEART WITHOUT ANGINA PECTORIS: ICD-10-CM

## 2019-05-28 DIAGNOSIS — I10 ESSENTIAL HYPERTENSION: Primary | ICD-10-CM

## 2019-05-28 DIAGNOSIS — E78.5 HYPERLIPIDEMIA LDL GOAL <100: ICD-10-CM

## 2019-05-28 DIAGNOSIS — R42 DIZZINESS: ICD-10-CM

## 2019-05-28 PROCEDURE — 99214 OFFICE O/P EST MOD 30 MIN: CPT | Performed by: INTERNAL MEDICINE

## 2019-05-28 NOTE — PROGRESS NOTES
Subjective   Nicolle Chavez is a 78 y.o. female.   Chief Complaint   Patient presents with   • Coronary Artery Disease   • Hypertension   • Hypothyroidism   • Hyperlipidemia       Hypertension   This is a chronic problem. The current episode started more than 1 year ago. Pertinent negatives include no chest pain, headaches, neck pain, palpitations or shortness of breath. There are no compliance problems.    Hyperlipidemia   This is a chronic problem. The current episode started more than 1 year ago. Exacerbating diseases include hypothyroidism. Pertinent negatives include no chest pain, myalgias or shortness of breath. There are no compliance problems.    Hypothyroidism   This is a chronic problem. Pertinent negatives include no abdominal pain, arthralgias, chest pain, chills, congestion, coughing, diaphoresis, fatigue, fever, headaches, myalgias, nausea, neck pain, numbness, rash, sore throat or vomiting.    CAD that is stable.   Increased reflux over last few months. Dizziness for last few months. Worst with movement. No syncope. No CP. No falls. Intermittent.   The following portions of the patient's history were reviewed and updated as appropriate: allergies, current medications, past family history, past medical history, past social history, past surgical history and problem list.    Review of Systems   Constitutional: Negative for activity change, appetite change, chills, diaphoresis, fatigue, fever and unexpected weight change.   HENT: Negative for congestion, ear discharge, ear pain, mouth sores, nosebleeds, sinus pressure, sneezing and sore throat.    Eyes: Negative for pain, discharge and itching.   Respiratory: Negative for cough, chest tightness, shortness of breath and wheezing.    Cardiovascular: Negative for chest pain, palpitations and leg swelling.   Gastrointestinal: Negative for abdominal pain, constipation, diarrhea, nausea and vomiting.        Increase reflux   Endocrine: Negative for cold  intolerance, heat intolerance, polydipsia and polyphagia.   Genitourinary: Negative for dysuria, flank pain, frequency, hematuria and urgency.   Musculoskeletal: Negative for arthralgias, back pain, gait problem, myalgias, neck pain and neck stiffness.   Skin: Negative for color change, pallor and rash.   Neurological: Positive for dizziness. Negative for seizures, speech difficulty, numbness and headaches.   Psychiatric/Behavioral: Negative for agitation, confusion, decreased concentration and sleep disturbance. The patient is not nervous/anxious.        /80   Pulse 68   Wt 65.1 kg (143 lb 9.6 oz)   SpO2 98%   BMI 26.26 kg/m²     Objective   Physical Exam   Constitutional: She appears well-developed.   HENT:   Head: Normocephalic.   Right Ear: External ear normal.   Left Ear: External ear normal.   Nose: Nose normal.   Mouth/Throat: Oropharynx is clear and moist.   Eyes: Conjunctivae are normal. Pupils are equal, round, and reactive to light.   Neck: No JVD present. No thyromegaly present.   Cardiovascular: Normal rate, regular rhythm and normal heart sounds. Exam reveals no friction rub.   No murmur heard.  Pulmonary/Chest: Effort normal and breath sounds normal. No respiratory distress. She has no wheezes. She has no rales.   Abdominal: Soft. Bowel sounds are normal. She exhibits no distension. There is no tenderness. There is no guarding.   Musculoskeletal: She exhibits no edema or tenderness.   Lymphadenopathy:     She has no cervical adenopathy.   Neurological: She displays normal reflexes. No cranial nerve deficit.   Skin: Skin is warm and dry. No rash noted.   Psychiatric: She has a normal mood and affect. Her behavior is normal.   Nursing note and vitals reviewed.      Assessment/Plan   Nicolle was seen today for hypertension, hyperlipidemia, hypothyroidism and heartburn.    Diagnoses and all orders for this visit:    Essential hypertension  Stable     Other specified  hypothyroidism  stable  Hyperlipidemia  Can't tolerate statins. Now on Repatha.(started two months ago) and cholesterol is improved.    CAD  Stable. Continue Repatha( started two months ago)  Gerd   herbert, Dr. Spears for EGD.     Garrick  Ordered b/l carotids duplex

## 2019-06-04 RX ORDER — LEVOTHYROXINE SODIUM 88 UG/1
TABLET ORAL
Qty: 90 TABLET | Refills: 0 | Status: SHIPPED | OUTPATIENT
Start: 2019-06-04 | End: 2019-06-06 | Stop reason: SDUPTHER

## 2019-06-05 DIAGNOSIS — I10 ESSENTIAL HYPERTENSION: ICD-10-CM

## 2019-06-05 RX ORDER — METOPROLOL SUCCINATE 25 MG/1
25 TABLET, EXTENDED RELEASE ORAL 2 TIMES DAILY
Qty: 180 TABLET | Refills: 0 | Status: SHIPPED | OUTPATIENT
Start: 2019-06-05 | End: 2019-06-07 | Stop reason: SDUPTHER

## 2019-06-05 RX ORDER — NIFEDIPINE 60 MG/1
60 TABLET, EXTENDED RELEASE ORAL DAILY
Qty: 90 TABLET | Refills: 0 | Status: SHIPPED | OUTPATIENT
Start: 2019-06-05 | End: 2019-06-20 | Stop reason: SDUPTHER

## 2019-06-06 ENCOUNTER — HOSPITAL ENCOUNTER (OUTPATIENT)
Dept: CARDIOLOGY | Facility: HOSPITAL | Age: 79
Discharge: HOME | End: 2019-06-06
Admitting: INTERNAL MEDICINE

## 2019-06-06 VITALS — HEIGHT: 62 IN | WEIGHT: 143 LBS | BODY MASS INDEX: 26.31 KG/M2

## 2019-06-06 DIAGNOSIS — E03.9 ACQUIRED HYPOTHYROIDISM: Primary | ICD-10-CM

## 2019-06-06 DIAGNOSIS — R42 DIZZINESS: ICD-10-CM

## 2019-06-06 PROCEDURE — 93880 EXTRACRANIAL BILAT STUDY: CPT

## 2019-06-06 PROCEDURE — 93880 EXTRACRANIAL BILAT STUDY: CPT | Performed by: INTERNAL MEDICINE

## 2019-06-06 RX ORDER — LEVOTHYROXINE SODIUM 88 UG/1
88 TABLET ORAL DAILY
Qty: 90 TABLET | Refills: 1 | Status: SHIPPED | OUTPATIENT
Start: 2019-06-06 | End: 2019-11-27 | Stop reason: SDUPTHER

## 2019-06-07 LAB
BH CV XLRA MEAS LEFT DIST CCA EDV: 20.6 CM/SEC
BH CV XLRA MEAS LEFT DIST CCA PSV: 79.6 CM/SEC
BH CV XLRA MEAS LEFT DIST ICA EDV: 12 CM/SEC
BH CV XLRA MEAS LEFT DIST ICA PSV: 33.4 CM/SEC
BH CV XLRA MEAS LEFT ICA/CCA RATIO: 0.97
BH CV XLRA MEAS LEFT MID CCA EDV: 22.6 CM/SEC
BH CV XLRA MEAS LEFT MID CCA PSV: 85.9 CM/SEC
BH CV XLRA MEAS LEFT MID ICA EDV: 23.6 CM/SEC
BH CV XLRA MEAS LEFT MID ICA PSV: 78.1 CM/SEC
BH CV XLRA MEAS LEFT PROX CCA EDV: 17.1 CM/SEC
BH CV XLRA MEAS LEFT PROX CCA PSV: 94.8 CM/SEC
BH CV XLRA MEAS LEFT PROX ECA EDV: 12.8 CM/SEC
BH CV XLRA MEAS LEFT PROX ECA PSV: 79.1 CM/SEC
BH CV XLRA MEAS LEFT PROX ICA EDV: 24.9 CM/SEC
BH CV XLRA MEAS LEFT PROX ICA PSV: 83.4 CM/SEC
BH CV XLRA MEAS LEFT PROX SCLA EDV: 0.87 CM/SEC
BH CV XLRA MEAS LEFT PROX SCLA PSV: 120.5 CM/SEC
BH CV XLRA MEAS LEFT VERTEBRAL A EDV: 6.3 CM/SEC
BH CV XLRA MEAS LEFT VERTEBRAL A PSV: 45.9 CM/SEC
BH CV XLRA MEAS RIGHT DIST CCA EDV: 12.7 CM/SEC
BH CV XLRA MEAS RIGHT DIST CCA PSV: 57.2 CM/SEC
BH CV XLRA MEAS RIGHT DIST ICA EDV: 24.1 CM/SEC
BH CV XLRA MEAS RIGHT DIST ICA PSV: 77.6 CM/SEC
BH CV XLRA MEAS RIGHT ICA/CCA RATIO: 0.87
BH CV XLRA MEAS RIGHT MID CCA EDV: 17.2 CM/SEC
BH CV XLRA MEAS RIGHT MID CCA PSV: 74.6 CM/SEC
BH CV XLRA MEAS RIGHT MID ICA EDV: 20.5 CM/SEC
BH CV XLRA MEAS RIGHT MID ICA PSV: 63.7 CM/SEC
BH CV XLRA MEAS RIGHT PROX CCA EDV: 17.3 CM/SEC
BH CV XLRA MEAS RIGHT PROX CCA PSV: 105.3 CM/SEC
BH CV XLRA MEAS RIGHT PROX ECA EDV: 9.2 CM/SEC
BH CV XLRA MEAS RIGHT PROX ECA PSV: 73.8 CM/SEC
BH CV XLRA MEAS RIGHT PROX ICA EDV: 18.3 CM/SEC
BH CV XLRA MEAS RIGHT PROX ICA PSV: 64.6 CM/SEC
BH CV XLRA MEAS RIGHT PROX SCLA PSV: 141.4 CM/SEC
BH CV XLRA MEAS RIGHT VERTEBRAL A EDV: 8.8 CM/SEC
BH CV XLRA MEAS RIGHT VERTEBRAL A PSV: 46.8 CM/SEC
LEFT ARM BP: NORMAL MMHG
RIGHT ARM BP: NORMAL MMHG

## 2019-06-07 RX ORDER — METOPROLOL SUCCINATE 25 MG/1
25 TABLET, EXTENDED RELEASE ORAL 2 TIMES DAILY
Qty: 180 TABLET | Refills: 0 | Status: SHIPPED | OUTPATIENT
Start: 2019-06-07 | End: 2019-07-29 | Stop reason: ALTCHOICE

## 2019-06-10 ENCOUNTER — TRANSCRIBE ORDERS (OUTPATIENT)
Dept: ADMINISTRATIVE | Facility: HOSPITAL | Age: 79
End: 2019-06-10

## 2019-06-10 ENCOUNTER — TELEPHONE (OUTPATIENT)
Dept: INTERNAL MEDICINE | Facility: CLINIC | Age: 79
End: 2019-06-10

## 2019-06-10 DIAGNOSIS — Z12.31 VISIT FOR SCREENING MAMMOGRAM: Primary | ICD-10-CM

## 2019-06-10 NOTE — TELEPHONE ENCOUNTER
PT IS CALLING BACK AFTER A MISSED CALL FROM JUANITA MORE THAN LIKELY. PLEASE CONTACT PT BACK @ [phone]

## 2019-06-11 NOTE — TELEPHONE ENCOUNTER
PATIENT IS RETURNING YOUR CALL REGARDING LABS. SHE WILL NOT BE AVAILABLE TILL AFTER 11:00 THIS MORNING. PT CAN BE REACHED [phone].

## 2019-06-17 ENCOUNTER — TELEPHONE (OUTPATIENT)
Dept: CARDIOLOGY | Facility: CLINIC | Age: 79
End: 2019-06-17

## 2019-06-20 DIAGNOSIS — I10 ESSENTIAL HYPERTENSION: ICD-10-CM

## 2019-06-20 RX ORDER — NIFEDIPINE 60 MG/1
60 TABLET, EXTENDED RELEASE ORAL DAILY
Qty: 90 TABLET | Refills: 1 | Status: SHIPPED | OUTPATIENT
Start: 2019-06-20 | End: 2019-07-11 | Stop reason: SINTOL

## 2019-07-11 ENCOUNTER — TELEPHONE (OUTPATIENT)
Dept: CARDIOLOGY | Facility: CLINIC | Age: 79
End: 2019-07-11

## 2019-07-11 NOTE — TELEPHONE ENCOUNTER
Her dentist- Moriah Rose told her Nifedipine is causing her to have gum disease. Gums are swollen and bleed. She wants to know if it can be changed? Also, wants to let you know that her legs and feet are retaining fluid. Please advise.

## 2019-07-11 NOTE — TELEPHONE ENCOUNTER
Stop nifedipine.  Keep home blood pressure log.  Some of her lower extremity edema may improve off nifedipine as well.  Report back to us with symptoms and blood pressures in 2 weeks.

## 2019-07-11 NOTE — TELEPHONE ENCOUNTER
Patient notified to Stop nifedipine.  Keep home blood pressure log.  Some of her lower extremity edema may improve off nifedipine as well.  Report back to us with symptoms and blood pressures in 2 weeks. Verbalized understanding.

## 2019-07-18 ENCOUNTER — HOSPITAL ENCOUNTER (OUTPATIENT)
Dept: MAMMOGRAPHY | Facility: HOSPITAL | Age: 79
Discharge: HOME | End: 2019-07-18
Admitting: INTERNAL MEDICINE

## 2019-07-18 DIAGNOSIS — Z12.31 VISIT FOR SCREENING MAMMOGRAM: ICD-10-CM

## 2019-07-18 PROCEDURE — 77067 SCR MAMMO BI INCL CAD: CPT

## 2019-07-18 PROCEDURE — 77063 BREAST TOMOSYNTHESIS BI: CPT | Performed by: RADIOLOGY

## 2019-07-18 PROCEDURE — 77063 BREAST TOMOSYNTHESIS BI: CPT

## 2019-07-18 PROCEDURE — 77067 SCR MAMMO BI INCL CAD: CPT | Performed by: RADIOLOGY

## 2019-07-29 ENCOUNTER — TELEPHONE (OUTPATIENT)
Dept: CARDIOLOGY | Facility: CLINIC | Age: 79
End: 2019-07-29

## 2019-07-29 RX ORDER — NEBIVOLOL 10 MG/1
10 TABLET ORAL DAILY
Qty: 30 TABLET | Refills: 5 | Status: SHIPPED | OUTPATIENT
Start: 2019-07-29 | End: 2020-01-20 | Stop reason: SDUPTHER

## 2019-07-29 NOTE — TELEPHONE ENCOUNTER
Called patient to report that  reviewed her b/p log that she sent in.  wants her to switch Toprol to Bystolic 10 mg by mouth daily. No answer. Left voice message to call our office

## 2019-08-05 ENCOUNTER — OFFICE VISIT (OUTPATIENT)
Dept: INTERNAL MEDICINE | Facility: CLINIC | Age: 79
End: 2019-08-05

## 2019-08-05 VITALS
WEIGHT: 144.8 LBS | BODY MASS INDEX: 26.48 KG/M2 | SYSTOLIC BLOOD PRESSURE: 130 MMHG | DIASTOLIC BLOOD PRESSURE: 90 MMHG | OXYGEN SATURATION: 98 % | HEART RATE: 64 BPM

## 2019-08-05 DIAGNOSIS — I25.10 CORONARY ARTERY DISEASE INVOLVING NATIVE CORONARY ARTERY OF NATIVE HEART WITHOUT ANGINA PECTORIS: ICD-10-CM

## 2019-08-05 DIAGNOSIS — I10 ESSENTIAL HYPERTENSION: ICD-10-CM

## 2019-08-05 DIAGNOSIS — N18.2 STAGE 2 CHRONIC KIDNEY DISEASE: ICD-10-CM

## 2019-08-05 DIAGNOSIS — Z00.00 MEDICARE ANNUAL WELLNESS VISIT, SUBSEQUENT: Primary | ICD-10-CM

## 2019-08-05 DIAGNOSIS — D12.6 ADENOMATOUS POLYP OF COLON, UNSPECIFIED PART OF COLON: ICD-10-CM

## 2019-08-05 DIAGNOSIS — F41.9 ANXIETY: ICD-10-CM

## 2019-08-05 DIAGNOSIS — N39.3 STRESS INCONTINENCE OF URINE: ICD-10-CM

## 2019-08-05 DIAGNOSIS — E03.9 ACQUIRED HYPOTHYROIDISM: ICD-10-CM

## 2019-08-05 DIAGNOSIS — K21.00 GASTROESOPHAGEAL REFLUX DISEASE WITH ESOPHAGITIS: ICD-10-CM

## 2019-08-05 DIAGNOSIS — E21.5 PARATHYROID DISORDER (HCC): ICD-10-CM

## 2019-08-05 DIAGNOSIS — E78.5 HYPERLIPIDEMIA LDL GOAL <100: ICD-10-CM

## 2019-08-05 PROBLEM — R00.2 PALPITATIONS: Status: RESOLVED | Noted: 2017-09-25 | Resolved: 2019-08-05

## 2019-08-05 LAB
ALBUMIN SERPL-MCNC: 4.5 G/DL (ref 3.5–5.2)
ALBUMIN/GLOB SERPL: 2.3 G/DL
ALP SERPL-CCNC: 65 U/L (ref 39–117)
ALT SERPL W P-5'-P-CCNC: 13 U/L (ref 1–33)
ANION GAP SERPL CALCULATED.3IONS-SCNC: 14.3 MMOL/L (ref 5–15)
AST SERPL-CCNC: 15 U/L (ref 1–32)
BASOPHILS # BLD AUTO: 0.03 10*3/MM3 (ref 0–0.2)
BASOPHILS NFR BLD AUTO: 0.7 % (ref 0–1.5)
BILIRUB SERPL-MCNC: 0.6 MG/DL (ref 0.2–1.2)
BUN BLD-MCNC: 14 MG/DL (ref 8–23)
BUN/CREAT SERPL: 16.7 (ref 7–25)
CALCIUM SPEC-SCNC: 9.1 MG/DL (ref 8.6–10.5)
CHLORIDE SERPL-SCNC: 101 MMOL/L (ref 98–107)
CHOLEST SERPL-MCNC: 149 MG/DL (ref 0–200)
CO2 SERPL-SCNC: 26.7 MMOL/L (ref 22–29)
CREAT BLD-MCNC: 0.84 MG/DL (ref 0.57–1)
DEPRECATED RDW RBC AUTO: 46.1 FL (ref 37–54)
EOSINOPHIL # BLD AUTO: 0.11 10*3/MM3 (ref 0–0.4)
EOSINOPHIL NFR BLD AUTO: 2.4 % (ref 0.3–6.2)
ERYTHROCYTE [DISTWIDTH] IN BLOOD BY AUTOMATED COUNT: 13.2 % (ref 12.3–15.4)
GFR SERPL CREATININE-BSD FRML MDRD: 65 ML/MIN/1.73
GLOBULIN UR ELPH-MCNC: 2 GM/DL
GLUCOSE BLD-MCNC: 72 MG/DL (ref 65–99)
HCT VFR BLD AUTO: 43.2 % (ref 34–46.6)
HDLC SERPL-MCNC: 57 MG/DL (ref 40–60)
HGB BLD-MCNC: 13.5 G/DL (ref 12–15.9)
IMM GRANULOCYTES # BLD AUTO: 0.01 10*3/MM3 (ref 0–0.05)
IMM GRANULOCYTES NFR BLD AUTO: 0.2 % (ref 0–0.5)
LDLC SERPL CALC-MCNC: 62 MG/DL (ref 0–100)
LDLC/HDLC SERPL: 1.08 {RATIO}
LYMPHOCYTES # BLD AUTO: 1.1 10*3/MM3 (ref 0.7–3.1)
LYMPHOCYTES NFR BLD AUTO: 24 % (ref 19.6–45.3)
MCH RBC QN AUTO: 29.7 PG (ref 26.6–33)
MCHC RBC AUTO-ENTMCNC: 31.3 G/DL (ref 31.5–35.7)
MCV RBC AUTO: 94.9 FL (ref 79–97)
MONOCYTES # BLD AUTO: 0.71 10*3/MM3 (ref 0.1–0.9)
MONOCYTES NFR BLD AUTO: 15.5 % (ref 5–12)
NEUTROPHILS # BLD AUTO: 2.63 10*3/MM3 (ref 1.7–7)
NEUTROPHILS NFR BLD AUTO: 57.2 % (ref 42.7–76)
NRBC BLD AUTO-RTO: 0 /100 WBC (ref 0–0.2)
PLATELET # BLD AUTO: 189 10*3/MM3 (ref 140–450)
PMV BLD AUTO: 10.6 FL (ref 6–12)
POTASSIUM BLD-SCNC: 4.2 MMOL/L (ref 3.5–5.2)
PROT SERPL-MCNC: 6.5 G/DL (ref 6–8.5)
RBC # BLD AUTO: 4.55 10*6/MM3 (ref 3.77–5.28)
SODIUM BLD-SCNC: 142 MMOL/L (ref 136–145)
TRIGL SERPL-MCNC: 152 MG/DL (ref 0–150)
TSH SERPL DL<=0.05 MIU/L-ACNC: 0.37 MIU/ML (ref 0.27–4.2)
VLDLC SERPL-MCNC: 30.4 MG/DL (ref 5–40)
WBC NRBC COR # BLD: 4.59 10*3/MM3 (ref 3.4–10.8)

## 2019-08-05 PROCEDURE — 84443 ASSAY THYROID STIM HORMONE: CPT | Performed by: INTERNAL MEDICINE

## 2019-08-05 PROCEDURE — G0439 PPPS, SUBSEQ VISIT: HCPCS | Performed by: INTERNAL MEDICINE

## 2019-08-05 PROCEDURE — 80053 COMPREHEN METABOLIC PANEL: CPT | Performed by: INTERNAL MEDICINE

## 2019-08-05 PROCEDURE — 80061 LIPID PANEL: CPT | Performed by: INTERNAL MEDICINE

## 2019-08-05 PROCEDURE — 85025 COMPLETE CBC W/AUTO DIFF WBC: CPT | Performed by: INTERNAL MEDICINE

## 2019-08-05 NOTE — PROGRESS NOTES
The ABCs of the Annual Wellness Visit  Subsequent Medicare Wellness Visit    Chief Complaint   Patient presents with   • Medicare Wellness-subsequent       Subjective   History of Present Illness:  Nicolle Chavez is a 78 y.o. female who presents for a Subsequent Medicare Wellness Visit.    HEALTH RISK ASSESSMENT    Recent Hospitalizations:  No hospitalization(s) within the last year.    Current Medical Providers:  Patient Care Team:  Christelle Gamez DO as PCP - General  Christelle Gamez DO as PCP - Family Medicine    Smoking Status:  Social History     Tobacco Use   Smoking Status Never Smoker   Smokeless Tobacco Never Used       Alcohol Consumption:  Social History     Substance and Sexual Activity   Alcohol Use No       Depression Screen:   PHQ-2/PHQ-9 Depression Screening 8/5/2019   Little interest or pleasure in doing things 0   Feeling down, depressed, or hopeless 0   Total Score 0       Fall Risk Screen:  MATTHIAS Fall Risk Assessment has not been completed.    Health Habits and Functional and Cognitive Screening:  Functional & Cognitive Status 8/5/2019   Do you have difficulty preparing food and eating? No   Do you have difficulty bathing yourself, getting dressed or grooming yourself? No   Do you have difficulty using the toilet? No   Do you have difficulty moving around from place to place? No   Do you have trouble with steps or getting out of a bed or a chair? No   Current Diet Well Balanced Diet   Dental Exam Up to date   Eye Exam Up to date   Exercise (times per week) 3 times per week   Current Exercise Activities Include Walking   Do you need help using the phone?  No   Are you deaf or do you have serious difficulty hearing?  No   Do you need help with transportation? No   Do you need help shopping? No   Do you need help preparing meals?  No   Do you need help with housework?  No   Do you need help with laundry? No   Do you need help taking your medications? No   Do you need help managing  money? No   Do you ever drive or ride in a car without wearing a seat belt? No   Have you felt unusual stress, anger or loneliness in the last month? Yes   Who do you live with? Spouse   If you need help, do you have trouble finding someone available to you? No   Have you been bothered in the last four weeks by sexual problems? No   Do you have difficulty concentrating, remembering or making decisions? No         Does the patient have evidence of cognitive impairment? No    Asprin use counseling:Taking ASA appropriately as indicated    Age-appropriate Screening Schedule:  Refer to the list below for future screening recommendations based on patient's age, sex and/or medical conditions. Orders for these recommended tests are listed in the plan section. The patient has been provided with a written plan.    Health Maintenance   Topic Date Due   • INFLUENZA VACCINE  08/01/2019   • ZOSTER VACCINE (2 of 2) 02/03/2020 (Originally 9/19/2017)   • LIPID PANEL  03/25/2020   • MAMMOGRAM  07/18/2021   • TDAP/TD VACCINES (2 - Td) 07/25/2027   • PNEUMOCOCCAL VACCINES (65+ LOW/MEDIUM RISK)  Completed          The following portions of the patient's history were reviewed and updated as appropriate: allergies, current medications, past family history, past medical history, past social history, past surgical history and problem list.    Outpatient Medications Prior to Visit   Medication Sig Dispense Refill   • aspirin 81 MG tablet Take 1 tablet by mouth daily.     • Azilsartan Medoxomil (EDARBI) 40 MG tablet Take 40 mg by mouth Daily. 90 tablet 1   • cinacalcet (SENSIPAR) 60 MG tablet Take 1 tablet by mouth daily.     • Denosumab (PROLIA SC) Inject  under the skin into the appropriate area as directed.     • Evolocumab 140 MG/ML solution auto-injector Inject 1 mL under the skin into the appropriate area as directed Every 14 (Fourteen) Days. 6 pen 3   • levothyroxine (SYNTHROID, LEVOTHROID) 88 MCG tablet Take 1 tablet by mouth Daily.  002074 90 tablet 1   • Multiple Vitamins-Minerals (PRESERVISION AREDS 2 PO) Take  by mouth 2 (Two) Times a Day.     • nebivolol (BYSTOLIC) 10 MG tablet Take 1 tablet by mouth Daily. 30 tablet 5   • Oxyquinolone Sulfate (TRIMO-MORTENSEN) 0.025 % gel Insert  into the vagina.     • pantoprazole (PROTONIX) 40 MG EC tablet      • PREMARIN 0.625 MG/GM vaginal cream 1 (One) Time Per Week.     • triamcinolone (KENALOG) 0.1 % cream Apply  topically to the appropriate area as directed 3 (Three) Times a Day As Needed for Irritation or Rash for up to 5 days. 15 g 0   • nitroglycerin (NITROSTAT) 0.4 MG SL tablet Place 0.4 mg under the tongue Every 5 (Five) Minutes As Needed for Chest Pain. Take no more than 3 doses in 15 minutes.       No facility-administered medications prior to visit.        Patient Active Problem List   Diagnosis   • Gastroesophageal reflux disease   • Chronic kidney disease   • Adenomatous polyp of colon   • Hyperlipidemia LDL goal <100   • Essential hypertension   • Acquired hypothyroidism   • Urinary incontinence   • Coronary artery disease involving native coronary artery without angina pectoris   • Parathyroid disorder (CMS/[facility])   • Anxiety       Advanced Care Planning:  Patient has an advance directive - a copy has not been provided. Have asked the patient to send this to us to add to record    Review of Systems   Constitutional: Negative for activity change, appetite change, chills, diaphoresis, fatigue, fever and unexpected weight change.   HENT: Negative for congestion, ear discharge, ear pain, mouth sores, nosebleeds, sinus pressure, sneezing and sore throat.    Eyes: Negative for pain, discharge and itching.   Respiratory: Negative for cough, chest tightness, shortness of breath and wheezing.    Cardiovascular: Negative for chest pain, palpitations and leg swelling.   Gastrointestinal: Negative for abdominal pain, constipation, diarrhea, nausea and vomiting.   Endocrine: Negative for cold intolerance,  heat intolerance, polydipsia and polyphagia.   Genitourinary: Negative for dysuria, flank pain, frequency, hematuria and urgency.   Musculoskeletal: Negative for arthralgias, back pain, gait problem, myalgias, neck pain and neck stiffness.   Skin: Negative for color change, pallor and rash.   Neurological: Negative for seizures, speech difficulty, numbness and headaches.   Psychiatric/Behavioral: Negative for agitation, confusion, decreased concentration and sleep disturbance. The patient is not nervous/anxious.        Compared to one year ago, the patient feels her physical health is the same.  Compared to one year ago, the patient feels her mental health is the same.    Reviewed chart for potential of high risk medication in the elderly: yes  Reviewed chart for potential of harmful drug interactions in the elderly:yes    Objective         Vitals:    08/05/19 0957   BP: 130/90   Pulse: 64   SpO2: 98%   Weight: 65.7 kg (144 lb 12.8 oz)   PainSc: 0-No pain       Body mass index is 26.48 kg/m².  Discussed the patient's BMI with her. The BMI is above average; BMI management plan is completed.    Physical Exam   Constitutional: She appears well-developed.   HENT:   Head: Normocephalic.   Right Ear: External ear normal.   Left Ear: External ear normal.   Nose: Nose normal.   Mouth/Throat: Oropharynx is clear and moist.   Eyes: Conjunctivae are normal. Pupils are equal, round, and reactive to light.   Neck: No JVD present. No thyromegaly present.   Cardiovascular: Normal rate, regular rhythm and normal heart sounds. Exam reveals no friction rub.   No murmur heard.  Pulmonary/Chest: Effort normal and breath sounds normal. No respiratory distress. She has no wheezes. She has no rales.   Abdominal: Soft. Bowel sounds are normal. She exhibits no distension. There is no tenderness. There is no guarding.   Musculoskeletal: She exhibits no edema or tenderness.   Lymphadenopathy:     She has no cervical adenopathy.    Neurological: She displays normal reflexes. No cranial nerve deficit.   Skin: No rash noted.   Psychiatric: Her behavior is normal.   Nursing note and vitals reviewed.            Assessment/Plan   Medicare Risks and Personalized Health Plan  CMS Preventative Services Quick Reference  Obesity/Overweight     The above risks/problems have been discussed with the patient.  Pertinent information has been shared with the patient in the After Visit Summary.  Follow up plans and orders are seen below in the Assessment/Plan Section.    Diagnoses and all orders for this visit:    1. Medicare annual wellness visit, subsequent (Primary)  Done today  2. Coronary artery disease involving native coronary artery of native heart without angina pectoris  stable  3. Essential hypertension  -     CBC & Differential  -     Comprehensive Metabolic Panel  Recent change in BP med by Cardiology  4. Hyperlipidemia LDL goal <100  -     Lipid Panel    5. Adenomatous polyp of colon, unspecified part of colon  Last colonoscopy 2019, next in 5 years  6. Gastroesophageal reflux disease with esophagitis  Stable on PPI  7. Acquired hypothyroidism  -     TSH    8. Parathyroid disorder (CMS/[facility])  Had left parotidectomy  9. Stage 2 chronic kidney disease  stable  10. Anxiety  stable  11. Stress incontinence of urine  stable    Follow Up:  Return in about 1 year (around 8/5/2020) for Medicare Wellness.     An After Visit Summary and PPPS were given to the patient.

## 2019-09-11 ENCOUNTER — OFFICE VISIT (OUTPATIENT)
Dept: CARDIOLOGY | Facility: CLINIC | Age: 79
End: 2019-09-11

## 2019-09-11 VITALS
SYSTOLIC BLOOD PRESSURE: 148 MMHG | BODY MASS INDEX: 26.39 KG/M2 | HEART RATE: 64 BPM | DIASTOLIC BLOOD PRESSURE: 76 MMHG | HEIGHT: 62 IN | WEIGHT: 143.4 LBS

## 2019-09-11 DIAGNOSIS — I25.10 CORONARY ARTERY DISEASE INVOLVING NATIVE CORONARY ARTERY OF NATIVE HEART WITHOUT ANGINA PECTORIS: Primary | ICD-10-CM

## 2019-09-11 DIAGNOSIS — I10 ESSENTIAL HYPERTENSION: ICD-10-CM

## 2019-09-11 DIAGNOSIS — E78.5 HYPERLIPIDEMIA LDL GOAL <100: ICD-10-CM

## 2019-09-11 DIAGNOSIS — R00.2 PALPITATIONS: ICD-10-CM

## 2019-09-11 PROCEDURE — 99214 OFFICE O/P EST MOD 30 MIN: CPT | Performed by: INTERNAL MEDICINE

## 2019-09-11 RX ORDER — CHLORTHALIDONE 25 MG/1
25 TABLET ORAL DAILY
Qty: 30 TABLET | Refills: 5 | Status: SHIPPED | OUTPATIENT
Start: 2019-09-11 | End: 2020-03-20

## 2019-09-11 RX ORDER — GLUCOSAMINE HCL 500 MG
1 TABLET ORAL DAILY
COMMUNITY
End: 2021-04-13

## 2019-09-11 NOTE — PROGRESS NOTES
[facility]  Office visit  Nicolle Chavez  1940  [phone]    VISIT DATE:  9/11/2019      PCP: Christelle Gamez,   [address]  [facility] [address]    CC:  Chief Complaint   Patient presents with   • Coronary Artery Disease   • Hypertension       PROBLEM LIST:  1. Coronary artery disease:  a. Field ST-elevation myocardial infarction, 11/25/2015, with associated chest pain, jaw pain, diaphoresis, and dyspnea; inferior-anterior ST elevation by outside telemetry strips.   b. Emergent cardiac catheterization, Dr. Isael Rosenbaum: Status post drug-eluting stent to the proximal right coronary artery using a 2.25 x 23 mm Xience Alpine stent.   c. Ejection fraction 55% to 60%.   2. Uncontrolled Essential HTN.  3. Dyspnea.   4. Mixed Dyslipidemia.   5. Hypothyroidism, on chronic supplementation.   6. 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   7. Lower extremity edema.   8. History of peptic ulcer disease and bleeding ulcer approximately 12 years ago; incomplete database.   9. Family history of coronary disease.   10. Anxiety.   11. Gout.   12. Surgical history:  a. Hysterectomy.   b. Left parathyroidectomy.  c. Ulcer repair.           ASSESSMENT:   Diagnosis Plan   1. Coronary artery disease involving native coronary artery of native heart without angina pectoris     2. Essential hypertension     3. Hyperlipidemia LDL goal <100         PLAN:  Coronary artery disease: Stable.  Continue aspirin, beta blockade, statin.    Hyperlipidemia: Significant improvement in overall cholesterol profile after initiation of PCSK9 inhibitor.  Continue current medical therapy.    Hypertension: Goal less than 140/90.  Discontinuing metoprolol in the setting of ongoing nebivolol use.  Adding chlorthalidone 25 mg p.o. daily.  BMP in 2 weeks.    Palpitations: 2-week ambulatory ECG monitor pending for symptom to rhythm  "correlation.    Subjective  Developed myalgias on atorvastatin.   Denies chest pain.  Blood pressures have been running less than 140/90 mmHg.  denies easy bruising on aspirin therapy..  Blood pressures running consistently higher than 140/90 mmHg.  Maintaining an active lifestyle, walks her dog on a regular basis.  Compliant with medical therapy.  Intermittent frontal headache which was previously resolved with chiropractic intervention.  Also notices intermittent irregular heartbeat in her right carotid, last for less than 3 to 5 minutes, no associated symptoms, no obvious triggers.  Can occur 2-3 times per day, is noticed over the previous 3 to 4 weeks.    PHYSICAL EXAMINATION:  Vitals:    09/11/19 1335   BP: 148/76   BP Location: Left arm   Patient Position: Sitting   Pulse: 64   Weight: 65 kg (143 lb 6.4 oz)   Height: 157.5 cm (62\")     General Appearance:    Alert, cooperative, no distress, appears stated age   Head:    Normocephalic, without obvious abnormality, atraumatic   Eyes:    conjunctiva/corneas clear   Nose:   Nares normal, septum midline, mucosa normal, no drainage   Throat:   Lips, teeth and gums normal   Neck:   Supple, symmetrical, trachea midline, no carotid    bruit or JVD   Lungs:     Clear to auscultation bilaterally, respirations unlabored   Chest Wall:    No tenderness or deformity    Heart:    Regular rate and rhythm, S1 and S2 normal, no murmur, rub   or gallop, normal carotid impulse bilaterally without bruit.   Abdomen:     Soft, non-tender   Extremities:   Extremities normal, atraumatic, no cyanosis or edema   Pulses:   2+ and symmetric all extremities   Skin:   Skin color, texture, turgor normal, no rashes or lesions       Diagnostic Data:  Procedures  Lab Results   Component Value Date    CHLPL 190 03/25/2019    TRIG 152 (H) 08/05/2019    HDL 57 08/05/2019     Lab Results   Component Value Date    GLUCOSE 72 08/05/2019    BUN 14 08/05/2019    CREATININE 0.84 08/05/2019     " 08/05/2019    K 4.2 08/05/2019     08/05/2019    CO2 26.7 08/05/2019     Lab Results   Component Value Date    HGBA1C 5.2 01/21/2016     Lab Results   Component Value Date    WBC 4.59 08/05/2019    HGB 13.5 08/05/2019    HCT 43.2 08/05/2019     08/05/2019       Allergies  Allergies   Allergen Reactions   • Crestor [Rosuvastatin Calcium] Myalgia   • Lipitor [Atorvastatin] Myalgia   • Pravachol [Pravastatin Sodium] Myalgia   • Statins    • Zocor [Simvastatin] Myalgia   • Aspirin Rash   • Bactrim [Sulfamethoxazole-Trimethoprim] Rash       Current Medications    Current Outpatient Medications:   •  aspirin 81 MG tablet, Take 1 tablet by mouth daily., Disp: , Rfl:   •  Azilsartan Medoxomil (EDARBI) 40 MG tablet, Take 40 mg by mouth Daily., Disp: 90 tablet, Rfl: 1  •  cinacalcet (SENSIPAR) 60 MG tablet, Take 1 tablet by mouth daily., Disp: , Rfl:   •  Coenzyme Q10 100 MG tablet, Take  by mouth Daily., Disp: , Rfl:   •  CRANBERRY PO, Take 650 mg by mouth Daily., Disp: , Rfl:   •  Denosumab (PROLIA SC), Inject  under the skin into the appropriate area as directed., Disp: , Rfl:   •  Evolocumab 140 MG/ML solution auto-injector, Inject 1 mL under the skin into the appropriate area as directed Every 14 (Fourteen) Days., Disp: 6 pen, Rfl: 3  •  KRILL OIL PO, Take  by mouth Daily., Disp: , Rfl:   •  levothyroxine (SYNTHROID, LEVOTHROID) 88 MCG tablet, Take 1 tablet by mouth Daily. 200001, Disp: 90 tablet, Rfl: 1  •  magnesium oxide (MAGOX) 400 (241.3 Mg) MG tablet tablet, Take 400 mg by mouth Daily., Disp: , Rfl:   •  metoprolol tartrate (LOPRESSOR) 25 MG tablet, Take 25 mg by mouth 2 (Two) Times a Day., Disp: , Rfl:   •  Multiple Vitamins-Minerals (PRESERVISION AREDS 2 PO), Take  by mouth 2 (Two) Times a Day., Disp: , Rfl:   •  nebivolol (BYSTOLIC) 10 MG tablet, Take 1 tablet by mouth Daily., Disp: 30 tablet, Rfl: 5  •  nitroglycerin (NITROSTAT) 0.4 MG SL tablet, Place 0.4 mg under the tongue Every 5 (Five) Minutes  As Needed for Chest Pain. Take no more than 3 doses in 15 minutes., Disp: , Rfl:   •  pantoprazole (PROTONIX) 40 MG EC tablet, , Disp: , Rfl:   •  PREMARIN 0.625 MG/GM vaginal cream, 1 (One) Time Per Week., Disp: , Rfl:           ROS  Review of Systems   Cardiovascular: Positive for chest pain, dyspnea on exertion, leg swelling and palpitations.   Respiratory: Positive for shortness of breath and snoring. Negative for cough and sputum production.        SOCIAL HX  Social History     Socioeconomic History   • Marital status:      Spouse name: Not on file   • Number of children: Not on file   • Years of education: Not on file   • Highest education level: Not on file   Tobacco Use   • Smoking status: Never Smoker   • Smokeless tobacco: Never Used   Substance and Sexual Activity   • Alcohol use: No   • Drug use: No   • Sexual activity: Defer       FAMILY HX  Family History   Problem Relation Age of Onset   • Anemia Mother    • Arthritis Mother    • Heart disease Mother    • Hypertension Mother    • Stroke Mother    • Cancer Father         stomach   • Breast cancer Neg Hx    • Ovarian cancer Neg Hx    • Colon cancer Neg Hx    • Endometrial cancer Neg Hx              Rick Qiu III, MD, FACC

## 2019-09-13 RX ORDER — NITROGLYCERIN 0.4 MG/1
0.4 TABLET SUBLINGUAL
Qty: 25 TABLET | Refills: 5 | Status: SHIPPED | OUTPATIENT
Start: 2019-09-13

## 2019-10-11 ENCOUNTER — TELEPHONE (OUTPATIENT)
Dept: CARDIOLOGY | Facility: CLINIC | Age: 79
End: 2019-10-11

## 2019-10-11 NOTE — TELEPHONE ENCOUNTER
Notified patient of Palpitations are likely due to extra heartbeats coming from the top chamber of your heart.  No concerning heart rhythm issues noted on monitor.  Continue current medical therapy. She verbalized understanding.

## 2019-10-11 NOTE — TELEPHONE ENCOUNTER
----- Message from Rick Qiu III, MD sent at 10/11/2019  9:00 AM EDT -----  Palpitations are likely due to extra heartbeats coming from the top chamber of your heart.  No concerning heart rhythm issues noted on monitor.  Continue current medical therapy.

## 2019-10-15 LAB
BUN SERPL-MCNC: 14 MG/DL (ref 8–23)
BUN/CREAT SERPL: 16.1 (ref 7–25)
CALCIUM SERPL-MCNC: 9.7 MG/DL (ref 8.6–10.5)
CHLORIDE SERPL-SCNC: 96 MMOL/L (ref 98–107)
CO2 SERPL-SCNC: 30.4 MMOL/L (ref 22–29)
CREAT SERPL-MCNC: 0.87 MG/DL (ref 0.57–1)
GLUCOSE SERPL-MCNC: 95 MG/DL (ref 65–99)
POTASSIUM SERPL-SCNC: 3.7 MMOL/L (ref 3.5–5.2)
SODIUM SERPL-SCNC: 139 MMOL/L (ref 136–145)

## 2019-10-28 RX ORDER — EVOLOCUMAB 140 MG/ML
INJECTION, SOLUTION SUBCUTANEOUS
Qty: 6 PEN | Refills: 3 | Status: SHIPPED | OUTPATIENT
Start: 2019-10-28 | End: 2020-10-06

## 2019-11-27 DIAGNOSIS — E03.9 ACQUIRED HYPOTHYROIDISM: ICD-10-CM

## 2019-11-27 RX ORDER — LEVOTHYROXINE SODIUM 88 UG/1
TABLET ORAL
Qty: 90 TABLET | Refills: 4 | Status: SHIPPED | OUTPATIENT
Start: 2019-11-27 | End: 2020-10-15

## 2019-11-27 RX ORDER — AZILSARTAN KAMEDOXOMIL 40 MG/1
TABLET ORAL
Qty: 90 TABLET | Refills: 1 | Status: SHIPPED | OUTPATIENT
Start: 2019-11-27 | End: 2020-05-26

## 2019-12-05 ENCOUNTER — OFFICE VISIT (OUTPATIENT)
Dept: INTERNAL MEDICINE | Facility: CLINIC | Age: 79
End: 2019-12-05

## 2019-12-05 VITALS
HEIGHT: 62 IN | RESPIRATION RATE: 16 BRPM | HEART RATE: 62 BPM | WEIGHT: 145 LBS | DIASTOLIC BLOOD PRESSURE: 80 MMHG | SYSTOLIC BLOOD PRESSURE: 130 MMHG | OXYGEN SATURATION: 95 % | BODY MASS INDEX: 26.68 KG/M2

## 2019-12-05 DIAGNOSIS — E78.5 HYPERLIPIDEMIA LDL GOAL <100: ICD-10-CM

## 2019-12-05 DIAGNOSIS — I25.10 CORONARY ARTERY DISEASE INVOLVING NATIVE CORONARY ARTERY OF NATIVE HEART WITHOUT ANGINA PECTORIS: Primary | ICD-10-CM

## 2019-12-05 DIAGNOSIS — I10 ESSENTIAL HYPERTENSION: ICD-10-CM

## 2019-12-05 DIAGNOSIS — E03.9 ACQUIRED HYPOTHYROIDISM: ICD-10-CM

## 2019-12-05 DIAGNOSIS — K21.00 GASTROESOPHAGEAL REFLUX DISEASE WITH ESOPHAGITIS: ICD-10-CM

## 2019-12-05 DIAGNOSIS — F41.9 ANXIETY: ICD-10-CM

## 2019-12-05 PROCEDURE — 99213 OFFICE O/P EST LOW 20 MIN: CPT | Performed by: INTERNAL MEDICINE

## 2019-12-05 NOTE — PROGRESS NOTES
Subjective   Nicolle Chavez is a 79 y.o. female.   Chief Complaint   Patient presents with   • Hypertension   • Hyperlipidemia   • Hypothyroidism   • Anxiety       Hypertension   This is a chronic problem. The current episode started more than 1 year ago. Associated symptoms include anxiety. Pertinent negatives include no chest pain, headaches, neck pain, palpitations or shortness of breath. There are no compliance problems.    Hyperlipidemia   This is a chronic problem. The current episode started more than 1 year ago. Exacerbating diseases include hypothyroidism. Pertinent negatives include no chest pain, myalgias or shortness of breath. There are no compliance problems.    Hypothyroidism   This is a chronic problem. Pertinent negatives include no abdominal pain, arthralgias, chest pain, chills, congestion, coughing, diaphoresis, fatigue, fever, headaches, myalgias, nausea, neck pain, numbness, rash, sore throat or vomiting.   Anxiety   Patient reports no chest pain, confusion, decreased concentration, dizziness, nausea, nervous/anxious behavior, palpitations or shortness of breath.        CAD that is stable.     The following portions of the patient's history were reviewed and updated as appropriate: allergies, current medications, past family history, past medical history, past social history, past surgical history and problem list.    Review of Systems   Constitutional: Negative for activity change, appetite change, chills, diaphoresis, fatigue, fever and unexpected weight change.   HENT: Negative for congestion, ear discharge, ear pain, mouth sores, nosebleeds, sinus pressure, sneezing and sore throat.    Eyes: Negative for pain, discharge and itching.   Respiratory: Negative for cough, chest tightness, shortness of breath and wheezing.    Cardiovascular: Negative for chest pain, palpitations and leg swelling.   Gastrointestinal: Negative for abdominal pain, constipation, diarrhea, nausea and vomiting.  "  Endocrine: Negative for cold intolerance, heat intolerance, polydipsia and polyphagia.   Genitourinary: Negative for dysuria, flank pain, frequency, hematuria and urgency.   Musculoskeletal: Negative for arthralgias, back pain, gait problem, myalgias, neck pain and neck stiffness.   Skin: Negative for color change, pallor and rash.   Neurological: Negative for dizziness, seizures, speech difficulty, numbness and headaches.   Psychiatric/Behavioral: Negative for agitation, confusion, decreased concentration and sleep disturbance. The patient is not nervous/anxious.        Ht 157.5 cm (62\")   Wt 65.8 kg (145 lb)   Breastfeeding? No   BMI 26.52 kg/m²     Objective   Physical Exam   Constitutional: She appears well-developed.   HENT:   Head: Normocephalic.   Right Ear: External ear normal.   Left Ear: External ear normal.   Nose: Nose normal.   Mouth/Throat: Oropharynx is clear and moist.   Eyes: Conjunctivae are normal. Pupils are equal, round, and reactive to light.   Neck: No JVD present. No thyromegaly present.   Cardiovascular: Normal rate, regular rhythm and normal heart sounds. Exam reveals no friction rub.   No murmur heard.  Pulmonary/Chest: Effort normal and breath sounds normal. No respiratory distress. She has no wheezes. She has no rales.   Abdominal: Soft. Bowel sounds are normal. She exhibits no distension. There is no tenderness. There is no guarding.   Musculoskeletal: She exhibits no edema or tenderness.   Lymphadenopathy:     She has no cervical adenopathy.   Neurological: She displays normal reflexes. No cranial nerve deficit.   Skin: Skin is warm and dry. No rash noted.   Psychiatric: She has a normal mood and affect. Her behavior is normal.   Nursing note and vitals reviewed.      Assessment/Plan   Nicolle was seen today for hypertension, hyperlipidemia, hypothyroidism and heartburn.    Diagnoses and all orders for this visit:    Essential hypertension  Stable     Other specified " hypothyroidism  stable  Hyperlipidemia  Does not tolerate statins. On Repatha    CAD  Stable. Continue Repatha( started two months ago)  Gerd   Stable on GERd. Had recent EGD= gastritis

## 2020-01-20 RX ORDER — NEBIVOLOL 10 MG/1
10 TABLET ORAL DAILY
Qty: 30 TABLET | Refills: 5 | Status: SHIPPED | OUTPATIENT
Start: 2020-01-20 | End: 2020-08-24

## 2020-03-20 RX ORDER — CHLORTHALIDONE 25 MG/1
TABLET ORAL
Qty: 30 TABLET | Refills: 0 | Status: SHIPPED | OUTPATIENT
Start: 2020-03-20 | End: 2020-03-30

## 2020-03-24 ENCOUNTER — OFFICE VISIT (OUTPATIENT)
Dept: CARDIOLOGY | Facility: CLINIC | Age: 80
End: 2020-03-24

## 2020-03-24 VITALS
OXYGEN SATURATION: 98 % | HEIGHT: 62 IN | SYSTOLIC BLOOD PRESSURE: 126 MMHG | BODY MASS INDEX: 26.09 KG/M2 | HEART RATE: 54 BPM | DIASTOLIC BLOOD PRESSURE: 70 MMHG | WEIGHT: 141.8 LBS

## 2020-03-24 DIAGNOSIS — I10 ESSENTIAL HYPERTENSION: ICD-10-CM

## 2020-03-24 DIAGNOSIS — I25.10 CORONARY ARTERY DISEASE INVOLVING NATIVE CORONARY ARTERY OF NATIVE HEART WITHOUT ANGINA PECTORIS: Primary | ICD-10-CM

## 2020-03-24 DIAGNOSIS — E78.5 HYPERLIPIDEMIA LDL GOAL <100: ICD-10-CM

## 2020-03-24 PROCEDURE — 99214 OFFICE O/P EST MOD 30 MIN: CPT | Performed by: INTERNAL MEDICINE

## 2020-03-24 NOTE — PROGRESS NOTES
[facility] at [facility]  Office visit  Nicolle Chavez  1940  [phone]    VISIT DATE:  3/24/2020      PCP: Christelle Gamez DO  [address] [place] [id number]    CC:  Chief Complaint   Patient presents with   • Coronary Artery Disease   • Dizziness       PROBLEM LIST:  1. Coronary artery disease:  a. Field ST-elevation myocardial infarction, 11/25/2015, with associated chest pain, jaw pain, diaphoresis, and dyspnea; inferior-anterior ST elevation by outside telemetry strips.   b. Emergent cardiac catheterization, Dr. Isael Rosenbaum: Status post drug-eluting stent to the proximal right coronary artery using a 2.25 x 23 mm Xience Alpine stent.   c. Ejection fraction 55% to 60%.   2. Uncontrolled Essential HTN.  3. Dyspnea.   4. Mixed Dyslipidemia.   5. Hypothyroidism, on chronic supplementation.   6. 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   7. Lower extremity edema.   8. History of peptic ulcer disease and bleeding ulcer approximately 12 years ago; incomplete database.   9. Family history of coronary disease.   10. Anxiety.   11. Gout.   12. Surgical history:  a. Hysterectomy.   b. Left parathyroidectomy.  c. Ulcer repair.       Previous cardiac studies and procedures:  October 2019 2-week ambulatory ECG monitor:  · Rare PACs which are intermittently symptomatic. Episodes of nonsustained atrial tachycardia.    ASSESSMENT:   Diagnosis Plan   1. Coronary artery disease involving native coronary artery of native heart without angina pectoris     2. Essential hypertension     3. Hyperlipidemia LDL goal <100         PLAN:  Coronary artery disease: Stable.  Continue aspirin, beta blockade, statin.    Hyperlipidemia: Significant improvement in overall cholesterol profile after initiation of PCSK9 inhibitor.  Continue current medical therapy.    Hypertension: Goal less than 130/80 mmHg.  Continue chlorthalidone and bisoprolol.  Weaning Edarbi off  "due to intermittent symptomatic hypotension.  She was instructed to take her chlorthalidone in the morning and her bisoprolol in the evening.    Symptomatic PACs: Currently well controlled.  Continue beta-blockade.  Known episodes of asymptomatic nonsustained atrial tachycardia.    Subjective  Developed myalgias on atorvastatin.   Denies chest pain.  Blood pressures have been running less than 140/90 mmHg.  denies easy bruising on aspirin therapy.. Blood pressures are now consistently running less than 120/80 mmHg.  Developing fatigue due to symptomatic hypotension about midmorning with systolic blood pressures in the mid 90s.  Maintaining an active lifestyle, walks her dog on a regular basis.  Compliant with medical therapy.  Palpitations are rare and well controlled.    PHYSICAL EXAMINATION:  Vitals:    03/24/20 1019   BP: 126/70   BP Location: Left arm   Patient Position: Sitting   Pulse: 54   SpO2: 98%   Weight: 64.3 kg (141 lb 12.8 oz)   Height: 157.5 cm (62\")     General Appearance:    Alert, cooperative, no distress, appears stated age   Head:    Normocephalic, without obvious abnormality, atraumatic   Eyes:    conjunctiva/corneas clear   Nose:   Nares normal, septum midline, mucosa normal, no drainage   Throat:   Lips, teeth and gums normal   Neck:   Supple, symmetrical, trachea midline, no carotid    bruit or JVD   Lungs:     Clear to auscultation bilaterally, respirations unlabored   Chest Wall:    No tenderness or deformity    Heart:    Regular rate and rhythm, S1 and S2 normal, no murmur, rub   or gallop, normal carotid impulse bilaterally without bruit.   Abdomen:     Soft, non-tender   Extremities:   Extremities normal, atraumatic, no cyanosis or edema   Pulses:   2+ and symmetric all extremities   Skin:   Skin color, texture, turgor normal, no rashes or lesions       Diagnostic Data:  Procedures  Lab Results   Component Value Date    CHLPL 190 03/25/2019    TRIG 152 (H) 08/05/2019    HDL 57 08/05/2019 "     Lab Results   Component Value Date    GLUCOSE 72 08/05/2019    BUN 14 10/15/2019    CREATININE 0.87 10/15/2019     10/15/2019    K 3.7 10/15/2019    CL 96 (L) 10/15/2019    CO2 30.4 (H) 10/15/2019     Lab Results   Component Value Date    HGBA1C 5.2 01/21/2016     Lab Results   Component Value Date    WBC 4.59 08/05/2019    HGB 13.5 08/05/2019    HCT 43.2 08/05/2019     08/05/2019       Allergies  Allergies   Allergen Reactions   • Pravachol [Pravastatin Sodium] Myalgia   • Statins Myalgia   • Zocor [Simvastatin] Myalgia   • Aspirin Rash   • Bactrim [Sulfamethoxazole-Trimethoprim] Rash   • Crestor [Rosuvastatin Calcium] Myalgia   • Lipitor [Atorvastatin] Myalgia       Current Medications    Current Outpatient Medications:   •  aspirin 81 MG tablet, Take 1 tablet by mouth daily., Disp: , Rfl:   •  chlorthalidone (HYGROTON) 25 MG tablet, TAKE 1 TABLET BY MOUTH ONE TIME A DAY , Disp: 30 tablet, Rfl: 0  •  cinacalcet (SENSIPAR) 60 MG tablet, Take 1 tablet by mouth daily., Disp: , Rfl:   •  Coenzyme Q10 100 MG tablet, Take  by mouth Daily., Disp: , Rfl:   •  Denosumab (PROLIA SC), Inject  under the skin into the appropriate area as directed. Yearly, Disp: , Rfl:   •  EDARBI 40 MG tablet, TAKE 1 TABLET DAILY, Disp: 90 tablet, Rfl: 1  •  levothyroxine (SYNTHROID, LEVOTHROID) 88 MCG tablet, TAKE 1 TABLET DAILY, Disp: 90 tablet, Rfl: 4  •  Multiple Vitamins-Minerals (PRESERVISION AREDS 2 PO), Take  by mouth 2 (Two) Times a Day., Disp: , Rfl:   •  nebivolol (BYSTOLIC) 10 MG tablet, Take 1 tablet by mouth Daily., Disp: 30 tablet, Rfl: 5  •  nitroglycerin (NITROSTAT) 0.4 MG SL tablet, Place 1 tablet under the tongue Every 5 (Five) Minutes As Needed for Chest Pain. Take no more than 3 doses in 15 minutes., Disp: 25 tablet, Rfl: 5  •  pantoprazole (PROTONIX) 40 MG EC tablet, 40 mg Daily., Disp: , Rfl:   •  PREMARIN 0.625 MG/GM vaginal cream, 1 (One) Time Per Week., Disp: , Rfl:   •  REPATHA SURECLICK 140 MG/ML  solution auto-injector, INJECT 140MG(1ML) UNDER THE SKIN EVERY 2 WEEKS, Disp: 6 pen, Rfl: 3  •  Unable to find, 1 each 1 (One) Time. Med Name: Eye injections for Macular Degeneration.     Eylea every 6-8 weeks., Disp: , Rfl:           ROS  Review of Systems   Cardiovascular: Positive for chest pain, dyspnea on exertion, leg swelling and palpitations.   Respiratory: Positive for shortness of breath and snoring. Negative for cough and sputum production.        SOCIAL HX  Social History     Socioeconomic History   • Marital status:      Spouse name: Not on file   • Number of children: Not on file   • Years of education: Not on file   • Highest education level: Not on file   Tobacco Use   • Smoking status: Never Smoker   • Smokeless tobacco: Never Used   Substance and Sexual Activity   • Alcohol use: No   • Drug use: No   • Sexual activity: Defer       FAMILY HX  Family History   Problem Relation Age of Onset   • Anemia Mother    • Arthritis Mother    • Heart disease Mother    • Hypertension Mother    • Stroke Mother    • Cancer Father         stomach   • Breast cancer Neg Hx    • Ovarian cancer Neg Hx    • Colon cancer Neg Hx    • Endometrial cancer Neg Hx              Rick Qiu III, MD, FACC

## 2020-03-26 ENCOUNTER — TELEPHONE (OUTPATIENT)
Dept: INTERNAL MEDICINE | Facility: CLINIC | Age: 80
End: 2020-03-26

## 2020-03-30 RX ORDER — CHLORTHALIDONE 25 MG/1
TABLET ORAL
Qty: 30 TABLET | Refills: 2 | Status: SHIPPED | OUTPATIENT
Start: 2020-03-30 | End: 2020-11-23

## 2020-06-22 ENCOUNTER — TRANSCRIBE ORDERS (OUTPATIENT)
Dept: ADMINISTRATIVE | Facility: HOSPITAL | Age: 80
End: 2020-06-22

## 2020-06-22 DIAGNOSIS — Z12.31 VISIT FOR SCREENING MAMMOGRAM: Primary | ICD-10-CM

## 2020-08-24 RX ORDER — NEBIVOLOL HYDROCHLORIDE 10 MG/1
TABLET ORAL
Qty: 30 TABLET | Refills: 1 | Status: SHIPPED | OUTPATIENT
Start: 2020-08-24 | End: 2020-11-02

## 2020-08-28 ENCOUNTER — HOSPITAL ENCOUNTER (OUTPATIENT)
Dept: MAMMOGRAPHY | Facility: HOSPITAL | Age: 80
Discharge: HOME | End: 2020-08-28
Admitting: INTERNAL MEDICINE

## 2020-08-28 DIAGNOSIS — Z12.31 VISIT FOR SCREENING MAMMOGRAM: ICD-10-CM

## 2020-08-28 PROCEDURE — 77063 BREAST TOMOSYNTHESIS BI: CPT

## 2020-08-28 PROCEDURE — 77067 SCR MAMMO BI INCL CAD: CPT

## 2020-08-28 PROCEDURE — 77063 BREAST TOMOSYNTHESIS BI: CPT | Performed by: RADIOLOGY

## 2020-08-28 PROCEDURE — 77067 SCR MAMMO BI INCL CAD: CPT | Performed by: RADIOLOGY

## 2020-10-06 RX ORDER — EVOLOCUMAB 140 MG/ML
INJECTION, SOLUTION SUBCUTANEOUS
Qty: 6 ML | Refills: 3 | Status: SHIPPED | OUTPATIENT
Start: 2020-10-06 | End: 2021-08-30

## 2020-10-13 ENCOUNTER — OFFICE VISIT (OUTPATIENT)
Dept: CARDIOLOGY | Facility: CLINIC | Age: 80
End: 2020-10-13

## 2020-10-13 VITALS
WEIGHT: 138.9 LBS | BODY MASS INDEX: 25.56 KG/M2 | HEIGHT: 62 IN | SYSTOLIC BLOOD PRESSURE: 134 MMHG | HEART RATE: 63 BPM | OXYGEN SATURATION: 97 % | DIASTOLIC BLOOD PRESSURE: 68 MMHG

## 2020-10-13 DIAGNOSIS — I25.10 CORONARY ARTERY DISEASE INVOLVING NATIVE CORONARY ARTERY OF NATIVE HEART WITHOUT ANGINA PECTORIS: Primary | ICD-10-CM

## 2020-10-13 DIAGNOSIS — I10 ESSENTIAL HYPERTENSION: ICD-10-CM

## 2020-10-13 DIAGNOSIS — E78.5 HYPERLIPIDEMIA LDL GOAL <100: ICD-10-CM

## 2020-10-13 DIAGNOSIS — E03.9 HYPOTHYROIDISM (ACQUIRED): ICD-10-CM

## 2020-10-13 PROCEDURE — 99214 OFFICE O/P EST MOD 30 MIN: CPT | Performed by: INTERNAL MEDICINE

## 2020-10-13 NOTE — PROGRESS NOTES
[facility] at [facility]  Office visit  Nicolle Chavez  1940  [phone]    VISIT DATE:  10/13/2020      PCP: Christelle Gamez DO  [address] [place] [id number]    CC:  Chief Complaint   Patient presents with   • Chest Pain   • Coronary Artery Disease       PROBLEM LIST:  1. Coronary artery disease:  a. Field ST-elevation myocardial infarction, 11/25/2015, with associated chest pain, jaw pain, diaphoresis, and dyspnea; inferior-anterior ST elevation by outside telemetry strips.   b. Emergent cardiac catheterization, Dr. Isael Rosenbaum: Status post drug-eluting stent to the proximal right coronary artery using a 2.25 x 23 mm Xience Alpine stent.   c. Ejection fraction 55% to 60%.   2. Uncontrolled Essential HTN.  3. Dyspnea.   4. Mixed Dyslipidemia.   5. Hypothyroidism, on chronic supplementation.   6. 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   7. Lower extremity edema.   8. History of peptic ulcer disease and bleeding ulcer approximately 12 years ago; incomplete database.   9. Family history of coronary disease.   10. Anxiety.   11. Gout.   12. Surgical history:  a. Hysterectomy.   b. Left parathyroidectomy.  c. Ulcer repair.       Previous cardiac studies and procedures:  October 2019 2-week ambulatory ECG monitor:  · Rare PACs which are intermittently symptomatic. Episodes of nonsustained atrial tachycardia.    ASSESSMENT:   Diagnosis Plan   1. Coronary artery disease involving native coronary artery of native heart without angina pectoris     2. Essential hypertension     3. Hyperlipidemia LDL goal <100         PLAN:  Coronary artery disease: Stable.  Continue aspirin, beta blockade, statin.    Hyperlipidemia: Significant improvement in overall cholesterol profile after initiation of PCSK9 inhibitor.  Continue current medical therapy.  Repeat fasting lipid panel pending.    Hypertension: Goal less than 130/80 mmHg.  Continue current  "medical therapy, she was instructed to decrease her chlorthalidone to 12.5 mg p.o. daily if she has another episode of symptomatic hypotension.    Symptomatic PACs: Currently well controlled.  Continue beta-blockade.  Known episodes of asymptomatic nonsustained atrial tachycardia.    Subjective  Developed myalgias on atorvastatin.   Currently with brief little twinges of sharp localized chest pain which are fleeting.  No obvious triggers other than emotional stress of  undergoing a recent pacemaker upgrade to ICD.  Blood pressures have been running less than 140/90 mmHg.  denies easy bruising on aspirin therapy.. Blood pressures are now consistently running less than 120/80 mmHg.  She had one episode of symptomatic hypotension since her last evaluation which actually occurred this morning.  Maintaining an active lifestyle, walks her dog on a regular basis.  Compliant with medical therapy.  Palpitations are rare and well controlled.    PHYSICAL EXAMINATION:  Vitals:    10/13/20 1051   Weight: 63 kg (138 lb 14.4 oz)   Height: 157.5 cm (62\")     General Appearance:    Alert, cooperative, no distress, appears stated age   Head:    Normocephalic, without obvious abnormality, atraumatic   Eyes:    conjunctiva/corneas clear   Nose:   Nares normal, septum midline, mucosa normal, no drainage   Throat:   Lips, teeth and gums normal   Neck:   Supple, symmetrical, trachea midline, no carotid    bruit or JVD   Lungs:     Clear to auscultation bilaterally, respirations unlabored   Chest Wall:    No tenderness or deformity    Heart:    Regular rate and rhythm, S1 and S2 normal, no murmur, rub   or gallop, normal carotid impulse bilaterally without bruit.   Abdomen:     Soft, non-tender   Extremities:   Extremities normal, atraumatic, no cyanosis or edema   Pulses:   2+ and symmetric all extremities   Skin:   Skin color, texture, turgor normal, no rashes or lesions       Diagnostic Data:  Procedures  Lab Results   Component " Value Date    CHLPL 190 03/25/2019    TRIG 152 (H) 08/05/2019    HDL 57 08/05/2019     Lab Results   Component Value Date    GLUCOSE 72 08/05/2019    BUN 14 10/15/2019    CREATININE 0.87 10/15/2019     10/15/2019    K 3.7 10/15/2019    CL 96 (L) 10/15/2019    CO2 30.4 (H) 10/15/2019     Lab Results   Component Value Date    HGBA1C 5.2 01/21/2016     Lab Results   Component Value Date    WBC 4.59 08/05/2019    HGB 13.5 08/05/2019    HCT 43.2 08/05/2019     08/05/2019       Allergies  Allergies   Allergen Reactions   • Pravachol [Pravastatin Sodium] Myalgia   • Statins Myalgia   • Zocor [Simvastatin] Myalgia   • Aspirin Rash   • Bactrim [Sulfamethoxazole-Trimethoprim] Rash   • Crestor [Rosuvastatin Calcium] Myalgia   • Lipitor [Atorvastatin] Myalgia       Current Medications    Current Outpatient Medications:   •  aspirin 81 MG tablet, Take 1 tablet by mouth daily., Disp: , Rfl:   •  Azilsartan Medoxomil (Edarbi) 40 MG tablet, Take 40 mg by mouth Daily. Need lab work for further refills., Disp: 90 tablet, Rfl: 0  •  BYSTOLIC 10 MG tablet, TAKE 1 TABLET BY MOUTH ONE TIME A DAY , Disp: 30 tablet, Rfl: 1  •  chlorthalidone (HYGROTON) 25 MG tablet, TAKE 1 TABLET BY MOUTH ONE TIME A DAY , Disp: 30 tablet, Rfl: 2  •  cinacalcet (SENSIPAR) 60 MG tablet, Take 1 tablet by mouth daily., Disp: , Rfl:   •  Coenzyme Q10 100 MG tablet, Take 1 tablet by mouth Daily., Disp: , Rfl:   •  Denosumab (PROLIA SC), Inject  under the skin into the appropriate area as directed As Needed., Disp: , Rfl:   •  levothyroxine (SYNTHROID, LEVOTHROID) 88 MCG tablet, TAKE 1 TABLET DAILY, Disp: 90 tablet, Rfl: 4  •  Multiple Vitamins-Minerals (PRESERVISION AREDS 2 PO), Take 1 tablet by mouth 2 (Two) Times a Day., Disp: , Rfl:   •  nitroglycerin (NITROSTAT) 0.4 MG SL tablet, Place 1 tablet under the tongue Every 5 (Five) Minutes As Needed for Chest Pain. Take no more than 3 doses in 15 minutes., Disp: 25 tablet, Rfl: 5  •  pantoprazole  (PROTONIX) 40 MG EC tablet, Take 40 mg by mouth Daily., Disp: , Rfl:   •  PREMARIN 0.625 MG/GM vaginal cream, 1 (One) Time Per Week., Disp: , Rfl:   •  Repatha SureClick 140 MG/ML solution auto-injector, INJECT 140MG(1ML) UNDER THE SKIN EVERY 2 WEEKS, Disp: 6 mL, Rfl: 3  •  Unable to find, 1 each 1 (One) Time. Med Name: Eye injections for Macular Degeneration.     Eylea every 6-8 weeks., Disp: , Rfl:           ROS  Review of Systems   Cardiovascular: Positive for chest pain, dyspnea on exertion, leg swelling and palpitations.   Respiratory: Positive for shortness of breath and snoring. Negative for cough and sputum production.        SOCIAL HX  Social History     Socioeconomic History   • Marital status:      Spouse name: Not on file   • Number of children: Not on file   • Years of education: Not on file   • Highest education level: Not on file   Tobacco Use   • Smoking status: Never Smoker   • Smokeless tobacco: Never Used   Substance and Sexual Activity   • Alcohol use: No   • Drug use: No   • Sexual activity: Defer       FAMILY HX  Family History   Problem Relation Age of Onset   • Anemia Mother    • Arthritis Mother    • Heart disease Mother    • Hypertension Mother    • Stroke Mother    • Cancer Father         stomach   • Breast cancer Neg Hx    • Ovarian cancer Neg Hx    • Colon cancer Neg Hx    • Endometrial cancer Neg Hx              Rick Qiu III, MD, [facility]

## 2020-10-14 LAB
ALBUMIN SERPL-MCNC: 4.6 G/DL (ref 3.5–5.2)
ALBUMIN/GLOB SERPL: 2.9 G/DL
ALP SERPL-CCNC: 88 U/L (ref 39–117)
ALT SERPL-CCNC: 14 U/L (ref 1–33)
AST SERPL-CCNC: 17 U/L (ref 1–32)
BASOPHILS # BLD AUTO: 0.03 10*3/MM3 (ref 0–0.2)
BASOPHILS NFR BLD AUTO: 0.8 % (ref 0–1.5)
BILIRUB SERPL-MCNC: 0.6 MG/DL (ref 0–1.2)
BUN SERPL-MCNC: 16 MG/DL (ref 8–23)
BUN/CREAT SERPL: 16.3 (ref 7–25)
CALCIUM SERPL-MCNC: 9.5 MG/DL (ref 8.6–10.5)
CHLORIDE SERPL-SCNC: 101 MMOL/L (ref 98–107)
CHOLEST SERPL-MCNC: 181 MG/DL (ref 0–200)
CO2 SERPL-SCNC: 28.2 MMOL/L (ref 22–29)
CREAT SERPL-MCNC: 0.98 MG/DL (ref 0.57–1)
EOSINOPHIL # BLD AUTO: 0.16 10*3/MM3 (ref 0–0.4)
EOSINOPHIL NFR BLD AUTO: 4.5 % (ref 0.3–6.2)
ERYTHROCYTE [DISTWIDTH] IN BLOOD BY AUTOMATED COUNT: 13.3 % (ref 12.3–15.4)
GLOBULIN SER CALC-MCNC: 1.6 GM/DL
GLUCOSE SERPL-MCNC: 89 MG/DL (ref 65–99)
HCT VFR BLD AUTO: 40.4 % (ref 34–46.6)
HDLC SERPL-MCNC: 59 MG/DL (ref 40–60)
HGB BLD-MCNC: 13.2 G/DL (ref 12–15.9)
IMM GRANULOCYTES # BLD AUTO: 0.01 10*3/MM3 (ref 0–0.05)
IMM GRANULOCYTES NFR BLD AUTO: 0.3 % (ref 0–0.5)
LDLC SERPL CALC-MCNC: 92 MG/DL (ref 0–100)
LYMPHOCYTES # BLD AUTO: 1.1 10*3/MM3 (ref 0.7–3.1)
LYMPHOCYTES NFR BLD AUTO: 31.2 % (ref 19.6–45.3)
MCH RBC QN AUTO: 28.4 PG (ref 26.6–33)
MCHC RBC AUTO-ENTMCNC: 32.7 G/DL (ref 31.5–35.7)
MCV RBC AUTO: 87.1 FL (ref 79–97)
MONOCYTES # BLD AUTO: 0.64 10*3/MM3 (ref 0.1–0.9)
MONOCYTES NFR BLD AUTO: 18.1 % (ref 5–12)
NEUTROPHILS # BLD AUTO: 1.59 10*3/MM3 (ref 1.7–7)
NEUTROPHILS NFR BLD AUTO: 45.1 % (ref 42.7–76)
NRBC BLD AUTO-RTO: 0 /100 WBC (ref 0–0.2)
PLATELET # BLD AUTO: 180 10*3/MM3 (ref 140–450)
POTASSIUM SERPL-SCNC: 3.9 MMOL/L (ref 3.5–5.2)
PROT SERPL-MCNC: 6.2 G/DL (ref 6–8.5)
RBC # BLD AUTO: 4.64 10*6/MM3 (ref 3.77–5.28)
SODIUM SERPL-SCNC: 139 MMOL/L (ref 136–145)
TRIGL SERPL-MCNC: 177 MG/DL (ref 0–150)
TSH SERPL DL<=0.005 MIU/L-ACNC: 8.74 UIU/ML (ref 0.27–4.2)
VLDLC SERPL CALC-MCNC: 30 MG/DL (ref 5–40)
WBC # BLD AUTO: 3.53 10*3/MM3 (ref 3.4–10.8)

## 2020-10-15 ENCOUNTER — TELEPHONE (OUTPATIENT)
Dept: CARDIOLOGY | Facility: CLINIC | Age: 80
End: 2020-10-15

## 2020-10-15 DIAGNOSIS — E03.9 HYPOTHYROIDISM (ACQUIRED): Primary | ICD-10-CM

## 2020-10-15 RX ORDER — LEVOTHYROXINE SODIUM 0.1 MG/1
100 TABLET ORAL DAILY
Qty: 90 TABLET | Refills: 1 | Status: SHIPPED | OUTPATIENT
Start: 2020-10-15 | End: 2020-10-19 | Stop reason: DRUGHIGH

## 2020-10-15 NOTE — TELEPHONE ENCOUNTER
----- Message from Rick Qiu III, MD sent at 10/15/2020  8:21 AM EDT -----  Thyroid function is a little too low.  Need to increase levothyroxine 200 mcg p.o. daily and repeat thyroid function test in 4 to 6 weeks with follow-up with primary care physician.

## 2020-10-19 ENCOUNTER — OFFICE VISIT (OUTPATIENT)
Dept: INTERNAL MEDICINE | Facility: CLINIC | Age: 80
End: 2020-10-19

## 2020-10-19 VITALS
OXYGEN SATURATION: 97 % | HEART RATE: 60 BPM | WEIGHT: 139.8 LBS | SYSTOLIC BLOOD PRESSURE: 132 MMHG | DIASTOLIC BLOOD PRESSURE: 80 MMHG | BODY MASS INDEX: 25.57 KG/M2

## 2020-10-19 DIAGNOSIS — E03.9 HYPOTHYROIDISM (ACQUIRED): ICD-10-CM

## 2020-10-19 DIAGNOSIS — I10 ESSENTIAL HYPERTENSION: ICD-10-CM

## 2020-10-19 DIAGNOSIS — F41.9 ANXIETY: ICD-10-CM

## 2020-10-19 DIAGNOSIS — K21.00 GASTROESOPHAGEAL REFLUX DISEASE WITH ESOPHAGITIS WITHOUT HEMORRHAGE: ICD-10-CM

## 2020-10-19 DIAGNOSIS — N39.3 STRESS INCONTINENCE OF URINE: ICD-10-CM

## 2020-10-19 DIAGNOSIS — D12.6 ADENOMATOUS POLYP OF COLON, UNSPECIFIED PART OF COLON: ICD-10-CM

## 2020-10-19 DIAGNOSIS — N18.2 STAGE 2 CHRONIC KIDNEY DISEASE: ICD-10-CM

## 2020-10-19 DIAGNOSIS — I25.10 CORONARY ARTERY DISEASE INVOLVING NATIVE CORONARY ARTERY OF NATIVE HEART WITHOUT ANGINA PECTORIS: ICD-10-CM

## 2020-10-19 DIAGNOSIS — Z23 NEED FOR INFLUENZA VACCINATION: ICD-10-CM

## 2020-10-19 DIAGNOSIS — Z00.00 MEDICARE ANNUAL WELLNESS VISIT, SUBSEQUENT: ICD-10-CM

## 2020-10-19 DIAGNOSIS — E78.5 HYPERLIPIDEMIA LDL GOAL <100: ICD-10-CM

## 2020-10-19 PROCEDURE — 90694 VACC AIIV4 NO PRSRV 0.5ML IM: CPT | Performed by: INTERNAL MEDICINE

## 2020-10-19 PROCEDURE — G0439 PPPS, SUBSEQ VISIT: HCPCS | Performed by: INTERNAL MEDICINE

## 2020-10-19 PROCEDURE — G0008 ADMIN INFLUENZA VIRUS VAC: HCPCS | Performed by: INTERNAL MEDICINE

## 2020-10-19 RX ORDER — LEVOTHYROXINE SODIUM 112 UG/1
112 TABLET ORAL DAILY
Qty: 90 TABLET | Refills: 1 | Status: SHIPPED | OUTPATIENT
Start: 2020-10-19 | End: 2021-03-30

## 2020-10-19 NOTE — PROGRESS NOTES
The ABCs of the Annual Wellness Visit  Subsequent Medicare Wellness Visit    Chief Complaint   Patient presents with   • Medicare Wellness-subsequent       Subjective   History of Present Illness:  Nicolle Chavez is a 80 y.o. female who presents for a Subsequent Medicare Wellness Visit.    HEALTH RISK ASSESSMENT    Recent Hospitalizations:  No hospitalization(s) within the last year.    Current Medical Providers:  Patient Care Team:  Christelle Gamez DO as PCP - General  Christelle Gamez DO as PCP - Family Medicine    Smoking Status:  Social History     Tobacco Use   Smoking Status Never Smoker   Smokeless Tobacco Never Used       Alcohol Consumption:  Social History     Substance and Sexual Activity   Alcohol Use No       Depression Screen:   PHQ-2/PHQ-9 Depression Screening 10/19/2020   Little interest or pleasure in doing things 0   Feeling down, depressed, or hopeless 0   Total Score 0       Fall Risk Screen:  MATTHIAS Fall Risk Assessment was completed, and patient is at LOW risk for falls.Assessment completed on:10/19/2020    Health Habits and Functional and Cognitive Screening:  Functional & Cognitive Status 10/19/2020   Do you have difficulty preparing food and eating? No   Do you have difficulty bathing yourself, getting dressed or grooming yourself? No   Do you have difficulty using the toilet? No   Do you have difficulty moving around from place to place? No   Do you have trouble with steps or getting out of a bed or a chair? No   Current Diet Well Balanced Diet   Dental Exam Up to date   Eye Exam Up to date   Exercise (times per week) 7 times per week   Current Exercise Activities Include Housecleaning   Do you need help using the phone?  No   Are you deaf or do you have serious difficulty hearing?  No   Do you need help with transportation? No   Do you need help shopping? No   Do you need help preparing meals?  No   Do you need help with housework?  No   Do you need help with laundry? No   Do  you need help taking your medications? No   Do you need help managing money? No   Do you ever drive or ride in a car without wearing a seat belt? No   Have you felt unusual stress, anger or loneliness in the last month? Yes   Who do you live with? Spouse   If you need help, do you have trouble finding someone available to you? No   Have you been bothered in the last four weeks by sexual problems? No   Do you have difficulty concentrating, remembering or making decisions? -         Does the patient have evidence of cognitive impairment? No    Asprin use counseling:Taking ASA appropriately as indicated    Age-appropriate Screening Schedule:  Refer to the list below for future screening recommendations based on patient's age, sex and/or medical conditions. Orders for these recommended tests are listed in the plan section. The patient has been provided with a written plan.    Health Maintenance   Topic Date Due   • ZOSTER VACCINE (2 of 2) 09/19/2017   • INFLUENZA VACCINE  08/01/2020   • LIPID PANEL  10/14/2021   • MAMMOGRAM  08/28/2022   • TDAP/TD VACCINES (2 - Td) 07/25/2027          The following portions of the patient's history were reviewed and updated as appropriate: allergies, current medications, past family history, past medical history, past social history, past surgical history and problem list.    Outpatient Medications Prior to Visit   Medication Sig Dispense Refill   • aspirin 81 MG tablet Take 1 tablet by mouth daily.     • Azilsartan Medoxomil (Edarbi) 40 MG tablet Take 40 mg by mouth Daily. Need lab work for further refills. 90 tablet 0   • BYSTOLIC 10 MG tablet TAKE 1 TABLET BY MOUTH ONE TIME A DAY  30 tablet 1   • chlorthalidone (HYGROTON) 25 MG tablet TAKE 1 TABLET BY MOUTH ONE TIME A DAY  30 tablet 2   • cinacalcet (SENSIPAR) 60 MG tablet Take 1 tablet by mouth daily.     • Coenzyme Q10 100 MG tablet Take 1 tablet by mouth Daily.     • Multiple Vitamins-Minerals (PRESERVISION AREDS 2 PO) Take 1  tablet by mouth 2 (Two) Times a Day.     • nitroglycerin (NITROSTAT) 0.4 MG SL tablet Place 1 tablet under the tongue Every 5 (Five) Minutes As Needed for Chest Pain. Take no more than 3 doses in 15 minutes. 25 tablet 5   • pantoprazole (PROTONIX) 40 MG EC tablet Take 40 mg by mouth Daily.     • PREMARIN 0.625 MG/GM vaginal cream 1 (One) Time Per Week.     • Repatha SureClick 140 MG/ML solution auto-injector INJECT 140MG(1ML) UNDER THE SKIN EVERY 2 WEEKS 6 mL 3   • Unable to find 1 each 1 (One) Time. Med Name: Eye injections for Macular Degeneration.     Eylea every 6-8 weeks.     • levothyroxine (Synthroid) 100 MCG tablet Take 1 tablet by mouth Daily. (Patient taking differently: Take 200 mcg by mouth Daily.) 90 tablet 1   • Denosumab (PROLIA SC) Inject  under the skin into the appropriate area as directed As Needed.       No facility-administered medications prior to visit.        Patient Active Problem List   Diagnosis   • Gastroesophageal reflux disease   • Chronic kidney disease   • Adenomatous polyp of colon   • Hyperlipidemia LDL goal <100   • Essential hypertension   • Acquired hypothyroidism   • Urinary incontinence   • Coronary artery disease involving native coronary artery without angina pectoris   • Parathyroid disorder (CMS/HCC)   • Anxiety       Advanced Care Planning:  ACP discussion was held with the patient during this visit. Patient has an advance directive (not in EMR), copy requested.    Review of Systems   Constitutional: Negative for activity change, appetite change, chills, diaphoresis, fatigue, fever and unexpected weight change.   HENT: Negative for congestion, ear discharge, ear pain, mouth sores, nosebleeds, sinus pressure, sneezing and sore throat.    Eyes: Negative for pain, discharge and itching.   Respiratory: Negative for cough, chest tightness, shortness of breath and wheezing.    Cardiovascular: Negative for chest pain, palpitations and leg swelling.   Gastrointestinal: Negative  for abdominal pain, constipation, diarrhea, nausea and vomiting.   Endocrine: Negative for cold intolerance, heat intolerance, polydipsia and polyphagia.   Genitourinary: Negative for dysuria, flank pain, frequency, hematuria and urgency.   Musculoskeletal: Negative for arthralgias, back pain, gait problem, myalgias, neck pain and neck stiffness.   Skin: Negative for color change, pallor and rash.   Neurological: Negative for seizures, speech difficulty, numbness and headaches.   Psychiatric/Behavioral: Negative for agitation, confusion, decreased concentration and sleep disturbance. The patient is not nervous/anxious.        Compared to one year ago, the patient feels her physical health is the same.  Compared to one year ago, the patient feels her mental health is the same.    Reviewed chart for potential of high risk medication in the elderly: yes  Reviewed chart for potential of harmful drug interactions in the elderly:yes    Objective         Vitals:    10/19/20 1345   BP: 132/80   Pulse: 60   SpO2: 97%   Weight: 63.4 kg (139 lb 12.8 oz)   PainSc: 0-No pain       Body mass index is 25.57 kg/m².  Discussed the patient's BMI with her. The BMI is above average; BMI management plan is completed.    Physical Exam  Vitals signs and nursing note reviewed.   Constitutional:       Appearance: Normal appearance. She is well-developed.   HENT:      Head: Normocephalic.      Right Ear: External ear normal.      Left Ear: External ear normal.      Nose: Nose normal.      Mouth/Throat:      Pharynx: No oropharyngeal exudate or posterior oropharyngeal erythema.   Eyes:      Conjunctiva/sclera: Conjunctivae normal.      Pupils: Pupils are equal, round, and reactive to light.   Neck:      Thyroid: No thyromegaly.      Vascular: No JVD.   Cardiovascular:      Rate and Rhythm: Normal rate and regular rhythm.      Heart sounds: Normal heart sounds. No murmur. No friction rub.   Pulmonary:      Effort: No respiratory distress.       Breath sounds: No wheezing or rales.   Abdominal:      General: Bowel sounds are normal. There is no distension.      Palpations: Abdomen is soft.      Tenderness: There is no abdominal tenderness.   Musculoskeletal:         General: No tenderness.   Lymphadenopathy:      Cervical: No cervical adenopathy.   Skin:     Findings: No rash.   Neurological:      General: No focal deficit present.      Mental Status: She is alert and oriented to person, place, and time.      Cranial Nerves: No cranial nerve deficit.      Deep Tendon Reflexes: Reflexes normal.   Psychiatric:         Behavior: Behavior normal.         Lab Results   Component Value Date    GLU 89 10/14/2020    CHLPL 181 10/14/2020    TRIG 177 (H) 10/14/2020    HDL 59 10/14/2020    LDL 92 10/14/2020    VLDL 30 10/14/2020        Assessment/Plan   Medicare Risks and Personalized Health Plan  CMS Preventative Services Quick Reference  Obesity/Overweight     The above risks/problems have been discussed with the patient.  Pertinent information has been shared with the patient in the After Visit Summary.  Follow up plans and orders are seen below in the Assessment/Plan Section.    Diagnoses and all orders for this visit:    1. Medicare annual wellness visit, subsequent  Done today  2. Hypothyroidism (acquired)  -     levothyroxine (Synthroid) 112 MCG tablet; Take 1 tablet by mouth Daily.  Dispense: 90 tablet; Refill: 1  Dc synthroid 200mcg sent in by Cardiology. Her current dose had been 100 mcg and tsh was 8.740, so I increased it to 112 mcg po qd and recheck 6 weeks.   3. Need for influenza vaccination  -     Fluad Quad >65 years    4. Coronary artery disease involving native coronary artery of native heart without angina pectoris  Stable on ASA and Repatha  5. Essential hypertension  Stable on Bystolic  6. Hyperlipidemia LDL goal <100  Stable on Repatha  7. Adenomatous polyp of colon, unspecified part of colon  2017 last colonoscopy with Dr. Deluca. Next 2022  8.  Gastroesophageal reflux disease with esophagitis without hemorrhage  Stable on Protonix  9. Stress incontinence of urine  stable  10. Anxiety  Stable with out meds  11. Stage 2 chronic kidney disease  stable    Follow Up:  Return in about 6 months (around 4/19/2021) for Recheck.     An After Visit Summary and PPPS were given to the patient.

## 2020-11-02 RX ORDER — NEBIVOLOL HYDROCHLORIDE 10 MG/1
TABLET ORAL
Qty: 30 TABLET | Refills: 5 | Status: SHIPPED | OUTPATIENT
Start: 2020-11-02 | End: 2021-04-30

## 2020-11-23 RX ORDER — CHLORTHALIDONE 25 MG/1
TABLET ORAL
Qty: 30 TABLET | Refills: 5 | Status: SHIPPED | OUTPATIENT
Start: 2020-11-23 | End: 2021-04-13

## 2020-11-30 RX ORDER — AZILSARTAN KAMEDOXOMIL 40 MG/1
1 TABLET ORAL DAILY
Qty: 90 TABLET | Refills: 3 | Status: SHIPPED | OUTPATIENT
Start: 2020-11-30 | End: 2022-01-25

## 2020-12-04 ENCOUNTER — LAB (OUTPATIENT)
Dept: LAB | Facility: HOSPITAL | Age: 80
End: 2020-12-04

## 2020-12-04 DIAGNOSIS — E03.9 HYPOTHYROIDISM (ACQUIRED): ICD-10-CM

## 2020-12-04 LAB
ALBUMIN SERPL-MCNC: 4.3 G/DL (ref 3.5–5.2)
ALBUMIN/GLOB SERPL: 1.9 G/DL
ALP SERPL-CCNC: 75 U/L (ref 39–117)
ALT SERPL W P-5'-P-CCNC: 13 U/L (ref 1–33)
ANION GAP SERPL CALCULATED.3IONS-SCNC: 10.4 MMOL/L (ref 5–15)
AST SERPL-CCNC: 17 U/L (ref 1–32)
BASOPHILS # BLD AUTO: 0.02 10*3/MM3 (ref 0–0.2)
BASOPHILS NFR BLD AUTO: 0.6 % (ref 0–1.5)
BILIRUB SERPL-MCNC: 0.3 MG/DL (ref 0–1.2)
BUN SERPL-MCNC: 14 MG/DL (ref 8–23)
BUN/CREAT SERPL: 17.1 (ref 7–25)
CALCIUM SPEC-SCNC: 9.9 MG/DL (ref 8.6–10.5)
CHLORIDE SERPL-SCNC: 98 MMOL/L (ref 98–107)
CHOLEST SERPL-MCNC: 149 MG/DL (ref 0–200)
CO2 SERPL-SCNC: 29.6 MMOL/L (ref 22–29)
CREAT SERPL-MCNC: 0.82 MG/DL (ref 0.57–1)
DEPRECATED RDW RBC AUTO: 40.5 FL (ref 37–54)
EOSINOPHIL # BLD AUTO: 0.16 10*3/MM3 (ref 0–0.4)
EOSINOPHIL NFR BLD AUTO: 4.8 % (ref 0.3–6.2)
ERYTHROCYTE [DISTWIDTH] IN BLOOD BY AUTOMATED COUNT: 13.1 % (ref 12.3–15.4)
GFR SERPL CREATININE-BSD FRML MDRD: 67 ML/MIN/1.73
GLOBULIN UR ELPH-MCNC: 2.3 GM/DL
GLUCOSE SERPL-MCNC: 88 MG/DL (ref 65–99)
HCT VFR BLD AUTO: 37.2 % (ref 34–46.6)
HDLC SERPL-MCNC: 45 MG/DL (ref 40–60)
HGB BLD-MCNC: 12.4 G/DL (ref 12–15.9)
IMM GRANULOCYTES # BLD AUTO: 0.01 10*3/MM3 (ref 0–0.05)
IMM GRANULOCYTES NFR BLD AUTO: 0.3 % (ref 0–0.5)
LDLC SERPL CALC-MCNC: 71 MG/DL (ref 0–100)
LDLC/HDLC SERPL: 1.42 {RATIO}
LYMPHOCYTES # BLD AUTO: 1.33 10*3/MM3 (ref 0.7–3.1)
LYMPHOCYTES NFR BLD AUTO: 39.8 % (ref 19.6–45.3)
MCH RBC QN AUTO: 28.8 PG (ref 26.6–33)
MCHC RBC AUTO-ENTMCNC: 33.3 G/DL (ref 31.5–35.7)
MCV RBC AUTO: 86.5 FL (ref 79–97)
MONOCYTES # BLD AUTO: 0.58 10*3/MM3 (ref 0.1–0.9)
MONOCYTES NFR BLD AUTO: 17.4 % (ref 5–12)
NEUTROPHILS NFR BLD AUTO: 1.24 10*3/MM3 (ref 1.7–7)
NEUTROPHILS NFR BLD AUTO: 37.1 % (ref 42.7–76)
NRBC BLD AUTO-RTO: 0 /100 WBC (ref 0–0.2)
PLATELET # BLD AUTO: 198 10*3/MM3 (ref 140–450)
PMV BLD AUTO: 10.4 FL (ref 6–12)
POTASSIUM SERPL-SCNC: 4 MMOL/L (ref 3.5–5.2)
PROT SERPL-MCNC: 6.6 G/DL (ref 6–8.5)
RBC # BLD AUTO: 4.3 10*6/MM3 (ref 3.77–5.28)
SODIUM SERPL-SCNC: 138 MMOL/L (ref 136–145)
TRIGL SERPL-MCNC: 200 MG/DL (ref 0–150)
TSH SERPL DL<=0.05 MIU/L-ACNC: 1.17 UIU/ML (ref 0.27–4.2)
VLDLC SERPL-MCNC: 33 MG/DL (ref 5–40)
WBC # BLD AUTO: 3.34 10*3/MM3 (ref 3.4–10.8)

## 2020-12-04 PROCEDURE — 36415 COLL VENOUS BLD VENIPUNCTURE: CPT | Performed by: INTERNAL MEDICINE

## 2020-12-04 PROCEDURE — 80053 COMPREHEN METABOLIC PANEL: CPT | Performed by: INTERNAL MEDICINE

## 2020-12-04 PROCEDURE — 85025 COMPLETE CBC W/AUTO DIFF WBC: CPT | Performed by: INTERNAL MEDICINE

## 2020-12-04 PROCEDURE — 84443 ASSAY THYROID STIM HORMONE: CPT | Performed by: INTERNAL MEDICINE

## 2020-12-04 PROCEDURE — 80061 LIPID PANEL: CPT | Performed by: INTERNAL MEDICINE

## 2020-12-07 ENCOUNTER — TELEPHONE (OUTPATIENT)
Dept: CARDIOLOGY | Facility: CLINIC | Age: 80
End: 2020-12-07

## 2020-12-07 NOTE — TELEPHONE ENCOUNTER
----- Message from Rick Qiu III, MD sent at 12/7/2020  9:23 AM EST -----  Labs look good, continue current medications.

## 2021-03-30 DIAGNOSIS — E03.9 HYPOTHYROIDISM (ACQUIRED): ICD-10-CM

## 2021-03-30 RX ORDER — LEVOTHYROXINE SODIUM 112 UG/1
TABLET ORAL
Qty: 90 TABLET | Refills: 3 | Status: SHIPPED | OUTPATIENT
Start: 2021-03-30 | End: 2021-04-19 | Stop reason: DRUGHIGH

## 2021-03-30 NOTE — TELEPHONE ENCOUNTER
Last Office Visit: 10/19/20  Next Office Visit:04/19/21    Labs completed in past 6 months? yes  Labs completed in past year? yes    Last Refill Date: 10/19/20  Quantity:90  Refills:1    Pharmacy:

## 2021-04-13 ENCOUNTER — OFFICE VISIT (OUTPATIENT)
Dept: CARDIOLOGY | Facility: CLINIC | Age: 81
End: 2021-04-13

## 2021-04-13 VITALS
WEIGHT: 133 LBS | TEMPERATURE: 97.5 F | DIASTOLIC BLOOD PRESSURE: 60 MMHG | OXYGEN SATURATION: 99 % | HEIGHT: 62 IN | HEART RATE: 62 BPM | BODY MASS INDEX: 24.48 KG/M2 | SYSTOLIC BLOOD PRESSURE: 118 MMHG

## 2021-04-13 DIAGNOSIS — E78.5 HYPERLIPIDEMIA LDL GOAL <100: ICD-10-CM

## 2021-04-13 DIAGNOSIS — I25.10 CORONARY ARTERY DISEASE INVOLVING NATIVE CORONARY ARTERY OF NATIVE HEART WITHOUT ANGINA PECTORIS: Primary | ICD-10-CM

## 2021-04-13 DIAGNOSIS — I10 ESSENTIAL HYPERTENSION: ICD-10-CM

## 2021-04-13 PROCEDURE — 99214 OFFICE O/P EST MOD 30 MIN: CPT | Performed by: INTERNAL MEDICINE

## 2021-04-13 RX ORDER — HYDROCHLOROTHIAZIDE 12.5 MG/1
12.5 CAPSULE, GELATIN COATED ORAL DAILY
Qty: 90 CAPSULE | Refills: 3 | Status: SHIPPED | OUTPATIENT
Start: 2021-04-13 | End: 2021-10-14

## 2021-04-13 NOTE — PROGRESS NOTES
[facility] at [facility]  Office visit  Nicolle Chavez  1940  [phone]    VISIT DATE:  4/13/2021      PCP: Christelle Gamez DO  [id number] [facility] [id number]    CC:  Chief Complaint   Patient presents with   • Coronary Artery Disease       PROBLEM LIST:  1. Coronary artery disease:  a. Field ST-elevation myocardial infarction, 11/25/2015, with associated chest pain, jaw pain, diaphoresis, and dyspnea; inferior-anterior ST elevation by outside telemetry strips.   b. Emergent cardiac catheterization, Dr. Isael Rosenbaum: Status post drug-eluting stent to the proximal right coronary artery using a 2.25 x 23 mm Xience Alpine stent.   c. Ejection fraction 55% to 60%.   2. Uncontrolled Essential HTN.  3. Dyspnea.   4. Mixed Dyslipidemia.   5. Hypothyroidism, on chronic supplementation.   6. 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   7. Lower extremity edema.   8. History of peptic ulcer disease and bleeding ulcer approximately 12 years ago; incomplete database.   9. Family history of coronary disease.   10. Anxiety.   11. Gout.   12. Surgical history:  a. Hysterectomy.   b. Left parathyroidectomy.  c. Ulcer repair.       Previous cardiac studies and procedures:  October 2019 2-week ambulatory ECG monitor:  · Rare PACs which are intermittently symptomatic. Episodes of nonsustained atrial tachycardia.    ASSESSMENT:   Diagnosis Plan   1. Coronary artery disease involving native coronary artery of native heart without angina pectoris     2. Essential hypertension     3. Hyperlipidemia LDL goal <100         PLAN:  Coronary artery disease: Stable.  Continue aspirin, beta blockade, statin.    Hyperlipidemia: Significant improvement in overall cholesterol profile after initiation of PCSK9 inhibitor.  Continue current medical therapy.  Repeat fasting lipid panel pending.    Hypertension: Goal less than 130/80 mmHg. Intermittent orthostasis due to  "symptomatic hypotension. Discontinue chlorthalidone, switching to hydrochlorothiazide 12.5 mg p.o. daily. She was instructed to discontinue thiazide diuretic altogether if she has recurrent episodes of lightheadedness in the future.    Symptomatic PACs: Currently well controlled.  Continue beta-blockade.  Known episodes of asymptomatic nonsustained atrial tachycardia.    Subjective  Developed myalgias on atorvastatin. Denies palpitations. Blood pressures predominately running less than 125/80 mmHg. Has intermittent episodes of lightheadedness with systolic blood pressures less than 100 mmHg. Maintaining active lifestyle. Reviewed recent CMP, lipid profile, and CBC.    PHYSICAL EXAMINATION:  Vitals:    04/13/21 0933   BP: 118/60   BP Location: Right arm   Patient Position: Sitting   Pulse: 62   Temp: 97.5 °F (36.4 °C)   SpO2: 99%   Weight: 60.3 kg (133 lb)   Height: 157.5 cm (62\")     General Appearance:    Alert, cooperative, no distress, appears stated age   Head:    Normocephalic, without obvious abnormality, atraumatic   Eyes:    conjunctiva/corneas clear   Nose:   Nares normal, septum midline, mucosa normal, no drainage   Throat:   Lips, teeth and gums normal   Neck:   Supple, symmetrical, trachea midline, no carotid    bruit or JVD   Lungs:     Clear to auscultation bilaterally, respirations unlabored   Chest Wall:    No tenderness or deformity    Heart:    Regular rate and rhythm, S1 and S2 normal, no murmur, rub   or gallop, normal carotid impulse bilaterally without bruit.   Abdomen:     Soft, non-tender   Extremities:   Extremities normal, atraumatic, no cyanosis or edema   Pulses:   2+ and symmetric all extremities   Skin:   Skin color, texture, turgor normal, no rashes or lesions       Diagnostic Data:  Procedures  Lab Results   Component Value Date    CHLPL 181 10/14/2020    TRIG 200 (H) 12/04/2020    HDL 45 12/04/2020     Lab Results   Component Value Date    GLUCOSE 88 12/04/2020    BUN 14 12/04/2020 "    CREATININE 0.82 12/04/2020     12/04/2020    K 4.0 12/04/2020    CL 98 12/04/2020    CO2 29.6 (H) 12/04/2020     Lab Results   Component Value Date    HGBA1C 5.2 01/21/2016     Lab Results   Component Value Date    WBC 3.34 (L) 12/04/2020    HGB 12.4 12/04/2020    HCT 37.2 12/04/2020     12/04/2020       Allergies  Allergies   Allergen Reactions   • Pravachol [Pravastatin Sodium] Myalgia   • Statins Myalgia   • Zocor [Simvastatin] Myalgia   • Aspirin Rash   • Bactrim [Sulfamethoxazole-Trimethoprim] Rash   • Crestor [Rosuvastatin Calcium] Myalgia   • Lipitor [Atorvastatin] Myalgia       Current Medications    Current Outpatient Medications:   •  aspirin 81 MG tablet, Take 1 tablet by mouth daily., Disp: , Rfl:   •  Azilsartan Medoxomil (Edarbi) 40 MG tablet, Take 40 mg by mouth Daily., Disp: 90 tablet, Rfl: 3  •  Bystolic 10 MG tablet, TAKE 1 TABLET BY MOUTH ONE TIME A DAY , Disp: 30 tablet, Rfl: 5  •  cinacalcet (SENSIPAR) 60 MG tablet, Take 1 tablet by mouth daily., Disp: , Rfl:   •  levothyroxine (SYNTHROID, LEVOTHROID) 112 MCG tablet, TAKE 1 TABLET DAILY (DOSE CHANGE) (Patient taking differently: 112 mcg Daily.), Disp: 90 tablet, Rfl: 3  •  Multiple Vitamins-Minerals (PRESERVISION AREDS 2 PO), Take 1 tablet by mouth 2 (Two) Times a Day., Disp: , Rfl:   •  nitroglycerin (NITROSTAT) 0.4 MG SL tablet, Place 1 tablet under the tongue Every 5 (Five) Minutes As Needed for Chest Pain. Take no more than 3 doses in 15 minutes., Disp: 25 tablet, Rfl: 5  •  pantoprazole (PROTONIX) 40 MG EC tablet, Take 40 mg by mouth Daily., Disp: , Rfl:   •  PREMARIN 0.625 MG/GM vaginal cream, 1 (One) Time Per Week., Disp: , Rfl:   •  Repatha SureClick 140 MG/ML solution auto-injector, INJECT 140MG(1ML) UNDER THE SKIN EVERY 2 WEEKS, Disp: 6 mL, Rfl: 3  •  Unable to find, 1 each 1 (One) Time. Med Name: Eye injections for Macular Degeneration.     Eylea every 6-8 weeks., Disp: , Rfl:   •  hydroCHLOROthiazide (MICROZIDE) 12.5  MG capsule, Take 1 capsule by mouth Daily., Disp: 90 capsule, Rfl: 3          ROS  Review of Systems   Cardiovascular: Positive for chest pain, dyspnea on exertion, leg swelling and palpitations.   Respiratory: Positive for shortness of breath and snoring. Negative for cough and sputum production.        SOCIAL HX  Social History     Socioeconomic History   • Marital status:      Spouse name: Not on file   • Number of children: Not on file   • Years of education: Not on file   • Highest education level: Not on file   Tobacco Use   • Smoking status: Never Smoker   • Smokeless tobacco: Never Used   Substance and Sexual Activity   • Alcohol use: No   • Drug use: No   • Sexual activity: Defer       FAMILY HX  Family History   Problem Relation Age of Onset   • Anemia Mother    • Arthritis Mother    • Heart disease Mother    • Hypertension Mother    • Stroke Mother    • Cancer Father         stomach   • Breast cancer Neg Hx    • Ovarian cancer Neg Hx    • Colon cancer Neg Hx    • Endometrial cancer Neg Hx              Rick Qiu III, MD, FACC

## 2021-04-19 ENCOUNTER — OFFICE VISIT (OUTPATIENT)
Dept: INTERNAL MEDICINE | Facility: CLINIC | Age: 81
End: 2021-04-19

## 2021-04-19 ENCOUNTER — HOSPITAL ENCOUNTER (OUTPATIENT)
Dept: GENERAL RADIOLOGY | Facility: HOSPITAL | Age: 81
Discharge: HOME | End: 2021-04-19

## 2021-04-19 ENCOUNTER — LAB (OUTPATIENT)
Dept: LAB | Facility: HOSPITAL | Age: 81
End: 2021-04-19

## 2021-04-19 VITALS
OXYGEN SATURATION: 97 % | TEMPERATURE: 97.8 F | BODY MASS INDEX: 24.29 KG/M2 | HEART RATE: 75 BPM | HEIGHT: 62 IN | DIASTOLIC BLOOD PRESSURE: 72 MMHG | WEIGHT: 132 LBS | SYSTOLIC BLOOD PRESSURE: 128 MMHG

## 2021-04-19 DIAGNOSIS — I10 ESSENTIAL HYPERTENSION: ICD-10-CM

## 2021-04-19 DIAGNOSIS — I25.10 CORONARY ARTERY DISEASE INVOLVING NATIVE CORONARY ARTERY OF NATIVE HEART WITHOUT ANGINA PECTORIS: Primary | ICD-10-CM

## 2021-04-19 DIAGNOSIS — K21.00 GASTROESOPHAGEAL REFLUX DISEASE WITH ESOPHAGITIS WITHOUT HEMORRHAGE: ICD-10-CM

## 2021-04-19 DIAGNOSIS — G89.29 CHRONIC LEFT SHOULDER PAIN: ICD-10-CM

## 2021-04-19 DIAGNOSIS — E78.5 HYPERLIPIDEMIA LDL GOAL <100: ICD-10-CM

## 2021-04-19 DIAGNOSIS — E03.9 ACQUIRED HYPOTHYROIDISM: ICD-10-CM

## 2021-04-19 DIAGNOSIS — M25.512 CHRONIC LEFT SHOULDER PAIN: ICD-10-CM

## 2021-04-19 LAB
ALBUMIN SERPL-MCNC: 4.4 G/DL (ref 3.5–5.2)
ALBUMIN/GLOB SERPL: 2 G/DL
ALP SERPL-CCNC: 71 U/L (ref 39–117)
ALT SERPL W P-5'-P-CCNC: 12 U/L (ref 1–33)
ANION GAP SERPL CALCULATED.3IONS-SCNC: 9.9 MMOL/L (ref 5–15)
AST SERPL-CCNC: 19 U/L (ref 1–32)
BASOPHILS # BLD AUTO: 0.02 10*3/MM3 (ref 0–0.2)
BASOPHILS NFR BLD AUTO: 0.4 % (ref 0–1.5)
BILIRUB SERPL-MCNC: 0.5 MG/DL (ref 0–1.2)
BUN SERPL-MCNC: 27 MG/DL (ref 8–23)
BUN/CREAT SERPL: 27.8 (ref 7–25)
CALCIUM SPEC-SCNC: 9.6 MG/DL (ref 8.6–10.5)
CHLORIDE SERPL-SCNC: 98 MMOL/L (ref 98–107)
CHOLEST SERPL-MCNC: 175 MG/DL (ref 0–200)
CO2 SERPL-SCNC: 30.1 MMOL/L (ref 22–29)
CREAT SERPL-MCNC: 0.97 MG/DL (ref 0.57–1)
DEPRECATED RDW RBC AUTO: 42.4 FL (ref 37–54)
EOSINOPHIL # BLD AUTO: 0.08 10*3/MM3 (ref 0–0.4)
EOSINOPHIL NFR BLD AUTO: 1.5 % (ref 0.3–6.2)
ERYTHROCYTE [DISTWIDTH] IN BLOOD BY AUTOMATED COUNT: 13 % (ref 12.3–15.4)
GFR SERPL CREATININE-BSD FRML MDRD: 55 ML/MIN/1.73
GLOBULIN UR ELPH-MCNC: 2.2 GM/DL
GLUCOSE SERPL-MCNC: 102 MG/DL (ref 65–99)
HCT VFR BLD AUTO: 37.8 % (ref 34–46.6)
HDLC SERPL-MCNC: 53 MG/DL (ref 40–60)
HGB BLD-MCNC: 12.4 G/DL (ref 12–15.9)
IMM GRANULOCYTES # BLD AUTO: 0.01 10*3/MM3 (ref 0–0.05)
IMM GRANULOCYTES NFR BLD AUTO: 0.2 % (ref 0–0.5)
LDLC SERPL CALC-MCNC: 97 MG/DL (ref 0–100)
LDLC/HDLC SERPL: 1.77 {RATIO}
LYMPHOCYTES # BLD AUTO: 1.25 10*3/MM3 (ref 0.7–3.1)
LYMPHOCYTES NFR BLD AUTO: 23.6 % (ref 19.6–45.3)
MCH RBC QN AUTO: 29.5 PG (ref 26.6–33)
MCHC RBC AUTO-ENTMCNC: 32.8 G/DL (ref 31.5–35.7)
MCV RBC AUTO: 89.8 FL (ref 79–97)
MONOCYTES # BLD AUTO: 0.92 10*3/MM3 (ref 0.1–0.9)
MONOCYTES NFR BLD AUTO: 17.4 % (ref 5–12)
NEUTROPHILS NFR BLD AUTO: 3.02 10*3/MM3 (ref 1.7–7)
NEUTROPHILS NFR BLD AUTO: 56.9 % (ref 42.7–76)
NRBC BLD AUTO-RTO: 0 /100 WBC (ref 0–0.2)
PLATELET # BLD AUTO: 174 10*3/MM3 (ref 140–450)
PMV BLD AUTO: 10.4 FL (ref 6–12)
POTASSIUM SERPL-SCNC: 3.5 MMOL/L (ref 3.5–5.2)
PROT SERPL-MCNC: 6.6 G/DL (ref 6–8.5)
RBC # BLD AUTO: 4.21 10*6/MM3 (ref 3.77–5.28)
SODIUM SERPL-SCNC: 138 MMOL/L (ref 136–145)
TRIGL SERPL-MCNC: 140 MG/DL (ref 0–150)
TSH SERPL DL<=0.05 MIU/L-ACNC: 0.62 UIU/ML (ref 0.27–4.2)
VLDLC SERPL-MCNC: 25 MG/DL (ref 5–40)
WBC # BLD AUTO: 5.3 10*3/MM3 (ref 3.4–10.8)

## 2021-04-19 PROCEDURE — 84443 ASSAY THYROID STIM HORMONE: CPT

## 2021-04-19 PROCEDURE — 85025 COMPLETE CBC W/AUTO DIFF WBC: CPT

## 2021-04-19 PROCEDURE — 80061 LIPID PANEL: CPT

## 2021-04-19 PROCEDURE — 73030 X-RAY EXAM OF SHOULDER: CPT

## 2021-04-19 PROCEDURE — 99214 OFFICE O/P EST MOD 30 MIN: CPT | Performed by: INTERNAL MEDICINE

## 2021-04-19 PROCEDURE — 80053 COMPREHEN METABOLIC PANEL: CPT

## 2021-04-19 RX ORDER — LEVOTHYROXINE SODIUM 0.1 MG/1
100 TABLET ORAL DAILY
Qty: 90 TABLET | Refills: 1
Start: 2021-04-19 | End: 2021-05-26 | Stop reason: SDUPTHER

## 2021-04-19 NOTE — PROGRESS NOTES
Subjective   Nicolle Chavez is a 80 y.o. female.   Chief Complaint   Patient presents with   • Hyperlipidemia   • Hypertension   • Shoulder Pain     Left shoulder pain and arm.  Limited motion X 2 months   • Hypothyroidism   • Heartburn   • Coronary Artery Disease       History of Present Illness   Here for f/u on chronic problems: htn, hlp, hypothyroid, heartburn, and CAD. HTN is controlled with  Edarbi, Bystolic, and HCTZ. No CP or SOA. No HA. No heart palpitations.  HLP controlled with Repatha. Hypothyroid controlled with synthroid. No fatigue.  Heartburn controlled with Protonix.  CAD controlled with Repatha and ASA.     Left shoulder pain x 3 months.  Hurts more with movement. Mainly reach behind back.  Fell about 3 months ago and landed with arms stretched out. No swelling.   The following portions of the patient's history were reviewed and updated as appropriate: allergies, current medications, past family history, past medical history, past social history, past surgical history and problem list.  Past Medical History:   Diagnosis Date   • Abdominal pain     Last Impression: 26 May 2015  Improving. The patient states she had a history of lower   abdominal pain after taking antibiotics for UTI, but this has improved.  Sanjiv Mak (Gastroenterology)   • Abnormal weight gain    • Anxiety 12/12/2016   • Cataract    • Cataract 2014   • Cataracts, bilateral    • Coronary artery disease 12/12/2016    Field ST-elevation myocardial infarction, 11/25/2015, with associated chest pain, jaw pain, diaphoresis, and dyspnea; inferior-anterior ST elevation by outside telemetry strips.  Emergent cardiac catheterization, Dr. Isael Rosenbaum:  Status post drug-eluting stent to the proximal right coronary artery using a 2.25 x 23 mm Xience Alpine stent.  Ejection fraction 55% to 60%.    • Dysphagia 8/15/2016    Last Impression: 26 May 2015  History of intermittent dysphagia symptoms. Differentials     include  esophageal dysmotility, Schatzki's ring.  Sanjiv Mak (Gastroenterology)     • Gout     2012   • Gout 2012   • H/O bone density study    • Headache    • Headache    • High cholesterol     1995   • Hyperparathyroidism (CMS/[facility])     2009   • Hypertension    • Hypothyroidism    • Myalgia and myositis 8/15/2016   • Myocardial infarction (CMS/[facility])    • Palpitations 9/25/2017   • Parathyroid disorder (CMS/[facility]) 12/12/2016    with a history of a left parathyroid parathyroidectomy.    • Peptic ulcer disease     and bleeding ulcer approximately 12 years ago; incomplete database.    • Sleep apnea      Past Surgical History:   Procedure Laterality Date   • APPENDECTOMY     • BREAST BIOPSY Right 1995    Ultrasound guided needle biopsy   • COLONOSCOPY     • HYSTERECTOMY Bilateral 1978   • OOPHORECTOMY     • OTHER SURGICAL HISTORY      Ulcer repair   • PARATHYROIDECTOMY Left      Family History   Problem Relation Age of Onset   • Anemia Mother    • Arthritis Mother    • Heart disease Mother    • Hypertension Mother    • Stroke Mother    • Cancer Father         stomach   • Breast cancer Neg Hx    • Ovarian cancer Neg Hx    • Colon cancer Neg Hx    • Endometrial cancer Neg Hx      Social History     Socioeconomic History   • Marital status:      Spouse name: Not on file   • Number of children: Not on file   • Years of education: Not on file   • Highest education level: Not on file   Tobacco Use   • Smoking status: Never Smoker   • Smokeless tobacco: Never Used   Substance and Sexual Activity   • Alcohol use: No   • Drug use: No   • Sexual activity: Defer     Current Outpatient Medications on File Prior to Visit   Medication Sig Dispense Refill   • aspirin 81 MG tablet Take 1 tablet by mouth daily.     • Azilsartan Medoxomil (Edarbi) 40 MG tablet Take 40 mg by mouth Daily. 90 tablet 3   • Bystolic 10 MG tablet TAKE 1 TABLET BY MOUTH ONE TIME A DAY  30 tablet 5   • cinacalcet (SENSIPAR) 60 MG tablet Take 1 tablet by mouth  daily.     • hydroCHLOROthiazide (MICROZIDE) 12.5 MG capsule Take 1 capsule by mouth Daily. 90 capsule 3   • Multiple Vitamins-Minerals (PRESERVISION AREDS 2 PO) Take 1 tablet by mouth 2 (Two) Times a Day.     • nitroglycerin (NITROSTAT) 0.4 MG SL tablet Place 1 tablet under the tongue Every 5 (Five) Minutes As Needed for Chest Pain. Take no more than 3 doses in 15 minutes. 25 tablet 5   • pantoprazole (PROTONIX) 40 MG EC tablet Take 40 mg by mouth Daily.     • PREMARIN 0.625 MG/GM vaginal cream 1 (One) Time Per Week.     • Repatha SureClick 140 MG/ML solution auto-injector INJECT 140MG(1ML) UNDER THE SKIN EVERY 2 WEEKS 6 mL 3   • Unable to find 1 each 1 (One) Time. Med Name: Eye injections for Macular Degeneration.     Eylea every 6-8 weeks.     • [DISCONTINUED] levothyroxine (SYNTHROID, LEVOTHROID) 112 MCG tablet TAKE 1 TABLET DAILY (DOSE CHANGE) (Patient taking differently: 100 mcg Daily.) 90 tablet 3     No current facility-administered medications on file prior to visit.       Review of Systems   Constitutional: Negative for activity change, appetite change, chills, diaphoresis, fatigue, fever and unexpected weight change.   HENT: Negative for congestion, ear discharge, ear pain, mouth sores, nosebleeds, sinus pressure, sneezing and sore throat.    Eyes: Negative for pain, discharge and itching.   Respiratory: Negative for cough, chest tightness, shortness of breath and wheezing.    Cardiovascular: Negative for chest pain, palpitations and leg swelling.   Gastrointestinal: Negative for abdominal pain, constipation, diarrhea, nausea and vomiting.   Endocrine: Negative for cold intolerance, heat intolerance, polydipsia and polyphagia.   Genitourinary: Negative for dysuria, flank pain, frequency, hematuria and urgency.   Musculoskeletal: Negative for arthralgias, back pain, gait problem, myalgias, neck pain and neck stiffness.        Shoulder pain   Skin: Negative for color change, pallor and rash.   Neurological:  "Negative for seizures, speech difficulty, numbness and headaches.   Psychiatric/Behavioral: Negative for agitation, confusion, decreased concentration and sleep disturbance. The patient is not nervous/anxious.      /72   Pulse 75   Temp 97.8 °F (36.6 °C)   Ht 157.5 cm (62\")   Wt 59.9 kg (132 lb)   SpO2 97%   BMI 24.14 kg/m²     Objective   Physical Exam  Vitals and nursing note reviewed.   Constitutional:       Appearance: She is well-developed.   HENT:      Head: Normocephalic.      Right Ear: External ear normal.      Left Ear: External ear normal.      Nose: Nose normal.   Eyes:      General: No scleral icterus.  Neck:      Thyroid: No thyromegaly.      Vascular: No JVD.   Cardiovascular:      Rate and Rhythm: Normal rate and regular rhythm.      Heart sounds: Normal heart sounds. No murmur heard.   No friction rub.   Pulmonary:      Effort: No respiratory distress.      Breath sounds: No wheezing or rales.   Abdominal:      General: There is no distension.      Tenderness: There is no abdominal tenderness.   Musculoskeletal:         General: No tenderness.      Right lower leg: No edema.      Left lower leg: No edema.   Skin:     Findings: No rash.   Neurological:      General: No focal deficit present.      Mental Status: She is alert and oriented to person, place, and time.      Cranial Nerves: No cranial nerve deficit.      Deep Tendon Reflexes: Reflexes normal.   Psychiatric:         Mood and Affect: Mood normal.         Behavior: Behavior normal.         Assessment/Plan   Diagnoses and all orders for this visit:    1. Coronary artery disease involving native coronary artery of native heart without angina pectoris (Primary)  Continue ASA 81 mg po qd and Repatha injection every 2 weeks  2. Essential hypertension  -     CBC & Differential; Future  -     Comprehensive Metabolic Panel; Future  Continue Edarbi 40 mg po qd., bystolic 10 mg po qd, and HCTZ 12.5 mg po qd.   3. Hyperlipidemia LDL goal " <100  -     Lipid Panel; Future  Continue Repatha injection every 2 weeks  4. Acquired hypothyroidism  -     TSH; Future   continue synthroid 100 mcg po qd  5. Gastroesophageal reflux disease with esophagitis without hemorrhage  Continue Protonix 40 mg po qd.   6. Chronic left shoulder pain  -     XR Shoulder 1 View Left; Future    She has her DEXA scan done outside [facility] and will call us with her date of her last one.

## 2021-04-20 DIAGNOSIS — M25.512 CHRONIC PAIN IN LEFT SHOULDER: Primary | ICD-10-CM

## 2021-04-20 DIAGNOSIS — R79.89 ABNORMAL CBC: Primary | ICD-10-CM

## 2021-04-20 DIAGNOSIS — G89.29 CHRONIC PAIN IN LEFT SHOULDER: Primary | ICD-10-CM

## 2021-04-22 ENCOUNTER — LAB (OUTPATIENT)
Dept: LAB | Facility: HOSPITAL | Age: 81
End: 2021-04-22

## 2021-04-22 DIAGNOSIS — R79.89 ABNORMAL CBC: ICD-10-CM

## 2021-04-23 LAB
LAB AP CASE REPORT: NORMAL
PATH REPORT.FINAL DX SPEC: NORMAL
PATHOLOGY REVIEW: YES

## 2021-04-27 ENCOUNTER — OFFICE VISIT (OUTPATIENT)
Dept: ORTHOPEDIC SURGERY | Facility: CLINIC | Age: 81
End: 2021-04-27

## 2021-04-27 VITALS
WEIGHT: 132.06 LBS | HEIGHT: 62 IN | HEART RATE: 69 BPM | BODY MASS INDEX: 24.3 KG/M2 | SYSTOLIC BLOOD PRESSURE: 145 MMHG | DIASTOLIC BLOOD PRESSURE: 80 MMHG

## 2021-04-27 DIAGNOSIS — M75.02 ADHESIVE CAPSULITIS OF LEFT SHOULDER: Primary | ICD-10-CM

## 2021-04-27 DIAGNOSIS — M75.42 IMPINGEMENT SYNDROME OF LEFT SHOULDER: ICD-10-CM

## 2021-04-27 DIAGNOSIS — M75.52 BURSITIS OF LEFT SHOULDER: ICD-10-CM

## 2021-04-27 PROCEDURE — 99204 OFFICE O/P NEW MOD 45 MIN: CPT | Performed by: ORTHOPAEDIC SURGERY

## 2021-04-27 PROCEDURE — 20610 DRAIN/INJ JOINT/BURSA W/O US: CPT | Performed by: ORTHOPAEDIC SURGERY

## 2021-04-27 RX ORDER — LIDOCAINE HYDROCHLORIDE 10 MG/ML
5 INJECTION, SOLUTION EPIDURAL; INFILTRATION; INTRACAUDAL; PERINEURAL
Status: COMPLETED | OUTPATIENT
Start: 2021-04-27 | End: 2021-04-27

## 2021-04-27 RX ORDER — METHYLPREDNISOLONE ACETATE 80 MG/ML
80 INJECTION, SUSPENSION INTRA-ARTICULAR; INTRALESIONAL; INTRAMUSCULAR; SOFT TISSUE
Status: COMPLETED | OUTPATIENT
Start: 2021-04-27 | End: 2021-04-27

## 2021-04-27 RX ADMIN — METHYLPREDNISOLONE ACETATE 80 MG: 80 INJECTION, SUSPENSION INTRA-ARTICULAR; INTRALESIONAL; INTRAMUSCULAR; SOFT TISSUE at 14:01

## 2021-04-27 RX ADMIN — LIDOCAINE HYDROCHLORIDE 5 ML: 10 INJECTION, SOLUTION EPIDURAL; INFILTRATION; INTRACAUDAL; PERINEURAL at 14:01

## 2021-04-27 NOTE — PROGRESS NOTES
[facility] [place], [place] Orthopaedic Surgery Office Visit - Gildardo Harris MD    Office Visit       Patient Name: Nicolle Chavez    Chief Complaint:   Chief Complaint   Patient presents with   • Left Shoulder - Pain       Referring Physician: Christelle Gamez DO    History of Present Illness:   Nicolle Chavez is a 80 y.o. female who presents with left body part: shoulder Reason: pain.  Onset:Onset: atraumatic and gradual in nature. The issue has been ongoing for 4 month(s). Pain is a 8/10 on the pain scale. Pain is described as Pain Characterization: throbbing and stabbing. Associated symptoms include Symptoms: pain and grinding. The pain is worse with sleeping, working and leisure; heat improve the pain. Previous treatments have included: lidocaine patches.  I have reviewed the patient's history of present illness as noted/entered above.    I have reviewed the patient's past medical history, surgical history, social history, family history, medications, and allergies as noted in the electronic medical record and as noted/entered.  I have reviewed the patient's review of systems as noted/enter and updated as noted in the patient's HPI.      Left shoulder pain and stiffness  Worsening pain since January, possible fall history around [employer] trees    Retired: education, college prof, principal, [employer]    [place]    Enjoys: [place]  's health declining unfortunately; she a lot of land and a large home -remains very active    Subjective   Subjective      Review of Systems   Constitutional: Negative.  Negative for chills, fatigue and fever.   HENT: Negative.  Negative for congestion and dental problem.    Eyes: Negative.  Negative for blurred vision.   Respiratory: Negative.  Negative for shortness of breath.    Cardiovascular: Negative.  Negative for leg swelling.   Gastrointestinal: Negative.  Negative for abdominal pain.    Endocrine: Negative.  Negative for polyuria.   Genitourinary: Negative.  Negative for difficulty urinating.   Musculoskeletal: Positive for arthralgias.   Skin: Negative.    Allergic/Immunologic: Negative.    Neurological: Negative.    Hematological: Negative.  Negative for adenopathy.   Psychiatric/Behavioral: Negative.  Negative for behavioral problems.        Past Medical History:   Past Medical History:   Diagnosis Date   • Abdominal pain     Last Impression: 26 May 2015  Improving. The patient states she had a history of lower   abdominal pain after taking antibiotics for UTI, but this has improved.  Sanjiv Mak (Gastroenterology)   • Abnormal weight gain    • Anxiety 12/12/2016   • Cataract    • Cataract 2014   • Cataracts, bilateral    • Coronary artery disease 12/12/2016    Field ST-elevation myocardial infarction, 11/25/2015, with associated chest pain, jaw pain, diaphoresis, and dyspnea; inferior-anterior ST elevation by outside telemetry strips.  Emergent cardiac catheterization, Dr. Isael Rosenbaum:  Status post drug-eluting stent to the proximal right coronary artery using a 2.25 x 23 mm Xience Alpine stent.  Ejection fraction 55% to 60%.    • Dysphagia 8/15/2016    Last Impression: 26 May 2015  History of intermittent dysphagia symptoms. Differentials     include esophageal dysmotility, Schatzki's ring.  Sanjiv Mak (Gastroenterology)     • Gout     2012   • Gout 2012   • H/O bone density study    • Headache    • Headache    • High cholesterol     1995   • Hyperparathyroidism (CMS/[facility])     2009   • Hypertension    • Hypothyroidism    • Myalgia and myositis 8/15/2016   • Myocardial infarction (CMS/[facility])    • Palpitations 9/25/2017   • Parathyroid disorder (CMS/[facility]) 12/12/2016    with a history of a left parathyroid parathyroidectomy.    • Peptic ulcer disease     and bleeding ulcer approximately 12 years ago; incomplete database.    • Sleep apnea        Past Surgical History:   Past  Surgical History:   Procedure Laterality Date   • APPENDECTOMY     • BREAST BIOPSY Right 1995    Ultrasound guided needle biopsy   • COLONOSCOPY     • HYSTERECTOMY Bilateral 1978   • OOPHORECTOMY     • OTHER SURGICAL HISTORY      Ulcer repair   • PARATHYROIDECTOMY Left        Family History:   Family History   Problem Relation Age of Onset   • Anemia Mother    • Arthritis Mother    • Heart disease Mother    • Hypertension Mother    • Stroke Mother    • Cancer Father         stomach   • Breast cancer Neg Hx    • Ovarian cancer Neg Hx    • Colon cancer Neg Hx    • Endometrial cancer Neg Hx        Social History:   Social History     Socioeconomic History   • Marital status:      Spouse name: Not on file   • Number of children: Not on file   • Years of education: Not on file   • Highest education level: Not on file   Tobacco Use   • Smoking status: Never Smoker   • Smokeless tobacco: Never Used   Substance and Sexual Activity   • Alcohol use: No   • Drug use: No   • Sexual activity: Defer       Medications:   Current Outpatient Medications:   •  aspirin 81 MG tablet, Take 1 tablet by mouth daily., Disp: , Rfl:   •  Azilsartan Medoxomil (Edarbi) 40 MG tablet, Take 40 mg by mouth Daily., Disp: 90 tablet, Rfl: 3  •  Bystolic 10 MG tablet, TAKE 1 TABLET BY MOUTH ONE TIME A DAY , Disp: 30 tablet, Rfl: 5  •  cinacalcet (SENSIPAR) 60 MG tablet, Take 1 tablet by mouth daily., Disp: , Rfl:   •  hydroCHLOROthiazide (MICROZIDE) 12.5 MG capsule, Take 1 capsule by mouth Daily., Disp: 90 capsule, Rfl: 3  •  levothyroxine (Synthroid) 100 MCG tablet, Take 1 tablet by mouth Daily., Disp: 90 tablet, Rfl: 1  •  Multiple Vitamins-Minerals (PRESERVISION AREDS 2 PO), Take 1 tablet by mouth 2 (Two) Times a Day., Disp: , Rfl:   •  nitroglycerin (NITROSTAT) 0.4 MG SL tablet, Place 1 tablet under the tongue Every 5 (Five) Minutes As Needed for Chest Pain. Take no more than 3 doses in 15 minutes., Disp: 25 tablet, Rfl: 5  •  pantoprazole  "(PROTONIX) 40 MG EC tablet, Take 40 mg by mouth Daily., Disp: , Rfl:   •  PREMARIN 0.625 MG/GM vaginal cream, 1 (One) Time Per Week., Disp: , Rfl:   •  Repatha SureClick 140 MG/ML solution auto-injector, INJECT 140MG(1ML) UNDER THE SKIN EVERY 2 WEEKS, Disp: 6 mL, Rfl: 3  •  Unable to find, 1 each 1 (One) Time. Med Name: Eye injections for Macular Degeneration.     Eylea every 6-8 weeks., Disp: , Rfl:     Allergies:   Allergies   Allergen Reactions   • Pravachol [Pravastatin Sodium] Myalgia   • Statins Myalgia   • Zocor [Simvastatin] Myalgia   • Aspirin Rash   • Bactrim [Sulfamethoxazole-Trimethoprim] Rash   • Crestor [Rosuvastatin Calcium] Myalgia   • Lipitor [Atorvastatin] Myalgia       The following portions of the patient's history were reviewed and updated as appropriate: allergies, current medications, past family history, past medical history, past social history, past surgical history and problem list.        Objective    Objective      Vital Signs:   Vitals:    04/27/21 1309   BP: 145/80   Pulse: 69   Weight: 59.9 kg (132 lb 0.9 oz)   Height: 157.5 cm (62.01\")       Ortho Exam:  Left frozen shoulder symptoms good rotator cuff strength, positive impingement syndrome no skin changes SLT intact diminished internal rotation slight diminished forward flexion and abduction painful arc greater than 90 degrees    Results Review:   Imaging Results (Last 24 Hours)     ** No results found for the last 24 hours. **        XR Shoulder 2+ View Left    Result Date: 4/19/2021  No acute bony abnormality.  D:  04/19/2021 E:  04/19/2021  This report was finalized on 4/19/2021 5:44 PM by Dr. Danielle Robledo MD.        I personally reviewed the left shoulder x-rays 4/19/2021 [place]ast[facility] no acute bony findings noted no fracture no superior migration glenohumeral joint space well preserved.    Procedures     Left posterior glenohumeral joint injection - SHOULDER GLENOHUMERAL JOINT INJECTION:  Risks and benefits of a shoulder " glenohumeral joint injection were discussed and the patient desired to proceed. Verbal consent was obtained. The patient understood the risk of infection, potential skin changes, bump in blood glucose especially with diabetes, nerve injury, possibility of increased pain in the short term, and possible incomplete pain relief. Using sterile technique, the glenohumeral joint was injected with 1mL of 80mg/mL Depo Medrol and 4cc of lidocaine with aspiration prior to injection. The patient tolerated the procedure without difficulty.  CPT CODE 17058 for major joint aspiration/injection          Assessment / Plan      Assessment/Plan:   Problem List Items Addressed This Visit        Musculoskeletal and Injuries    Adhesive capsulitis of left shoulder - Primary    Bursitis of left shoulder    Impingement syndrome of left shoulder        Left shoulder  Selective rest/activity modifications recommended while the shoulder recovers, although stretching and physical therapy are encouraged.    History of GERD counseled caution with regards to NSAIDs    Physical therapy prescription provided and stretching program discussed    Steroid injection - a steroid injection can be beneficial and was offered.  Risks and benefits were discussed including a bump in blood glucose in the case of Diabetes.    Follow-up - the patient can schedule an appointment for 2 months pending progress with the nonoperative treatment program    Patient counseling was performed with a discussion of the following:  What is a frozen shoulder?  Frozen Shoulder or Idiopathic Adhesive Capsulitis - the patient has a diagnosis of idiopathic adhesive capsulitis or “frozen shoulder.”  We discussed that this condition can have variable “freezing” and “thawing” phases that can be very painful.  Fortunately, this condition rarely requires operative intervention and responds to conservative measures gradually over time.  Unfortunately, I did  that the symptoms can  last up to 6-12 months in some patients and frozen shoulder can return to the same shoulder or impact the other shoulder in the future.    When will I see the patient back?  I will see the patient back in 2 months to follow-up their progress pending progress or sooner if needed.  If the patient is improved then they can call to cancel the appointment.  If the patient has improved range of motion, but continued pain, then we will look for other potential causes of shoulder pain at the follow-up appointment.  The melissa now is to improve the range of motion and gradually decrease the pain they are experiencing.    Is an MRI needed at this time?   I counseled the patient that an MRI will not diagnose a frozen shoulder, but an MRI is indicated in the patient is refractory to our nonoperative treatment program for frozen shoulder.  An MRI can help to look for other potential causes of shoulder pain pending response to nonoperative treatment.      Follow Up: 2 months        Gildardo Harris MD, FAAOS  Orthopedic Surgeon  Fellowship Trained Shoulder and Elbow Surgeon  [facility]  Orthopedics and Sports Medicine  [address] [place] [address], [address]  [place] [address]    04/27/21  14:02 EDT    Please note that portions of this note may have been completed with a voice recognition program. Efforts were made to edit the dictations, but occasionally words are mistranscribed.

## 2021-04-27 NOTE — PROGRESS NOTES
Procedure   Large Joint Arthrocentesis: L glenohumeral  Date/Time: 4/27/2021 2:01 PM  Consent given by: patient  Site marked: site marked  Timeout: Immediately prior to procedure a time out was called to verify the correct patient, procedure, equipment, support staff and site/side marked as required   Supporting Documentation  Indications: pain   Procedure Details  Location: shoulder - L glenohumeral  Preparation: Patient was prepped and draped in the usual sterile fashion  Needle size: 23 G  Approach: posterior  Medications administered: 5 mL lidocaine PF 1% 1 %; 80 mg methylPREDNISolone acetate 80 MG/ML  Patient tolerance: patient tolerated the procedure well with no immediate complications

## 2021-04-30 RX ORDER — NEBIVOLOL HYDROCHLORIDE 10 MG/1
TABLET ORAL
Qty: 30 TABLET | Refills: 5 | Status: SHIPPED | OUTPATIENT
Start: 2021-04-30 | End: 2021-10-14

## 2021-05-06 ENCOUNTER — TREATMENT (OUTPATIENT)
Dept: PHYSICAL THERAPY | Facility: CLINIC | Age: 81
End: 2021-05-06

## 2021-05-06 DIAGNOSIS — M75.52 BURSITIS OF LEFT SHOULDER: ICD-10-CM

## 2021-05-06 DIAGNOSIS — M75.42 IMPINGEMENT SYNDROME OF LEFT SHOULDER: ICD-10-CM

## 2021-05-06 DIAGNOSIS — M75.02 ADHESIVE CAPSULITIS OF LEFT SHOULDER: Primary | ICD-10-CM

## 2021-05-06 PROCEDURE — 97161 PT EVAL LOW COMPLEX 20 MIN: CPT | Performed by: PHYSICAL THERAPIST

## 2021-05-06 PROCEDURE — 97140 MANUAL THERAPY 1/> REGIONS: CPT | Performed by: PHYSICAL THERAPIST

## 2021-05-06 NOTE — PROGRESS NOTES
Physical Therapy Initial Evaluation and Plan of Care      Patient: Nicolle Chavez   : 1940  Diagnosis/ICD-10 Code:  Adhesive capsulitis of left shoulder [M75.02]  Referring practitioner: Gildardo Harris MD    Subjective Evaluation    History of Present Illness  Mechanism of injury: Patient reports that her L shoulder began hurting approx. 4 months ago. 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 She states that the pain gradually worsened. She states that she had a cortisone injection in her L shoulder on  and it has helped a lot with the pain. She states that she has noticed a decreased ROM in her L shoulder and is having difficulty getting dressed, reaching up in cabinets and walking her dog.     Patient reports that when her shoulder is really irritated she has a difficult time sleeping. She states that driving is also really limited currently by her shoulder pain.         Patient Occupation: Retired Quality of life: good    Pain  Current pain ratin  At best pain ratin  At worst pain ratin  Quality: sharp  Relieving factors: heat and rest  Progression: improved    Social Support  Lives with: spouse    Hand dominance: right    Diagnostic Tests  X-ray: normal    Treatments  Previous treatment: injection treatment  Patient Goals  Patient goals for therapy: decreased pain, increased motion, increased strength, independence with ADLs/IADLs and return to sport/leisure activities             Objective          Postural Observations  Seated posture: fair  Standing posture: fair    Additional Postural Observation Details  Moderated thoracic kyphosis noted.     Palpation   Left   Tenderness of the anterior deltoid, middle deltoid, pectoralis major, supraspinatus, triceps and upper trapezius.     Tenderness     Left Shoulder   Tenderness in the bicipital groove and supraspinatus tendon.     Cervical/Thoracic Screen   Cervical range of motion within normal  limits  Cervical range of motion within normal limits with the following exceptions: Patient had reports of pain in L SCM with cervical rotation to R.   Thoracic range of motion within normal limits    Active Range of Motion   Left Shoulder   Flexion: 98 degrees with pain  Abduction: 86 degrees with pain    Right Shoulder   Flexion: 140 degrees   Abduction: 155 degrees     Passive Range of Motion   Left Shoulder   Flexion: 112 degrees   Abduction: 92 degrees     Scapular Mobility   Left Shoulder   Scapular mobility: fair    Right Shoulder   Scapular mobility: fair    Joint Play   Left Shoulder  Joints within functional limits are the anterior capsule, posterior capsule and inferior capsule.     Right Shoulder  Joints within functional limits are the anterior capsule, posterior capsule and inferior capsule.     Strength/Myotome Testing     Left Shoulder     Planes of Motion   Flexion: 4-   Extension: 4   Abduction: 3+   Adduction: 4   External rotation at 0°: 4-   Internal rotation at 0°: 4     Right Shoulder     Planes of Motion   Flexion: 4+   Extension: 4+   Abduction: 4+   Adduction: 4+   External rotation at 0°: 4+   Internal rotation at 0°: 4+     Additional Strength Details  Strength tested with shoulder at 0 degrees with elbow flexed to 90 deg.    Tests     Left Shoulder   Positive Hawkin's and Neer's.      General Comments     Shoulder Comments   Patient tolerated HEP well with no complaints of shoulder pain.     Patient was educated on the use of ice to assist with management of symptoms at home.          Assessment & Plan     Assessment  Impairments: abnormal or restricted ROM, activity intolerance, impaired physical strength, lacks appropriate home exercise program and pain with function  Assessment details: Patient is a 80 year old female who comes to physical therapy with complaints of L shoulder pain. Signs and symptoms are consistent with impingement of L supraspinatus. The patient currently has pain,  decreased ROM, decreased strength, and inability to perform all essential functional activities. Pt will benefit from skilled PT services to address the above issues.     Functional Limitations: carrying objects, lifting, sleeping, pulling, pushing, uncomfortable because of pain, reaching behind back and reaching overhead  Goals  Plan Goals: SHORT TERM GOALS:     2 weeks  1. Pt I w/ HEP  2. Pt to demonstrate PROM of the left shoulder to WFL to improve ability to perform ADL's  3. Pt to demonstrate ability to perform 30 minutes continuous activity in the clinic without increase in pain     LONG TERM GOALS:   6 weeks  1. Pt to demonstrate AROM of the left shoulder to WNL to allow ability to perform all necessary functional activities  2. Pt to demonstrate ability to lift 5# OH with the left arm without increase in pain  3. Pt to report being able to work in home without increase in pain in the shoulder  4. Pt to tolerate 60 minutes continuous activity in the clinic without increase in pain         Plan  Therapy options: will be seen for skilled physical therapy services  Planned modality interventions: cryotherapy  Planned therapy interventions: flexibility, functional ROM exercises, home exercise program, joint mobilization, therapeutic activities, stretching, strengthening, soft tissue mobilization, neuromuscular re-education and manual therapy  Frequency: 1x week  Duration in weeks: 6  Treatment plan discussed with: patient        Manual Therapy:    9     mins  59740;  Therapeutic Exercise:    5     mins  63937;     Neuromuscular Jhonny:        mins  86173;    Therapeutic Activity:          mins  86028;     Gait Training:           mins  27460;     Ultrasound:          mins  63870;    Electrical Stimulation:         mins  41178 ( );  Dry Needling          mins self-pay    Timed Treatment:   14   mins   Total Treatment:     42   mins    PT SIGNATURE: Tracey Roy PT   DATE TREATMENT INITIATED:  5/6/2021    Initial Certification  Certification Period: 8/4/2021  I certify that the therapy services are furnished while this patient is under my care.  The services outlined above are required by this patient, and will be reviewed every 90 days.     PHYSICIAN: Gildardo Harris MD      DATE:     Please sign and return via fax to [phone].. Thank you, [facility].

## 2021-05-19 ENCOUNTER — TREATMENT (OUTPATIENT)
Dept: PHYSICAL THERAPY | Facility: CLINIC | Age: 81
End: 2021-05-19

## 2021-05-19 DIAGNOSIS — M75.52 BURSITIS OF LEFT SHOULDER: ICD-10-CM

## 2021-05-19 DIAGNOSIS — M75.02 ADHESIVE CAPSULITIS OF LEFT SHOULDER: Primary | ICD-10-CM

## 2021-05-19 DIAGNOSIS — M75.42 IMPINGEMENT SYNDROME OF LEFT SHOULDER: ICD-10-CM

## 2021-05-19 PROCEDURE — 97110 THERAPEUTIC EXERCISES: CPT | Performed by: PHYSICAL THERAPIST

## 2021-05-19 PROCEDURE — 97140 MANUAL THERAPY 1/> REGIONS: CPT | Performed by: PHYSICAL THERAPIST

## 2021-05-19 PROCEDURE — 97530 THERAPEUTIC ACTIVITIES: CPT | Performed by: PHYSICAL THERAPIST

## 2021-05-19 NOTE — PROGRESS NOTES
Faxed Health info release form to [address] in [place] was received Physical Therapy Daily Progress Note    Patient Information  Nicolle Chavez  1940      Visit # : 2    Nicolle Chavez reports 3/10 pain today at rest.  Patient reports that her pain has been better and states that she has not been getting the sharp, shooting pain that she has had in the past.         Objective Pt presents to PT today with no distress noted.     L shoulder PROM  Flexion: 119  Abduction: 98      See Exercise, Manual, and Modality Logs for complete treatment.     Assessment/Plan  Patient tolerated session well with no reports of pain with exercise. Patient tolerated manual therapy well with only mild reports of discomfort near end ranges. Patient had reports of reduced pain but continues to use L UE to do all ADL's. Patient was encouraged to continue HEP. HEP was progressed during today's session.       Progress per Plan of Care  PT will continue to monitor patients signs and symptoms and progress patient as tolerated.     Visit Diagnoses:    ICD-10-CM ICD-9-CM   1. Adhesive capsulitis of left shoulder  M75.02 726.0   2. Bursitis of left shoulder  M75.52 726.10   3. Impingement syndrome of left shoulder  M75.42 726.2            Manual Therapy:    12     mins  15855;  Therapeutic Exercise:    19     mins  09304;     Neuromuscular Jhonny:        mins  52369;    Therapeutic Activity:     8     mins  78633;     Gait Training:           mins  56452;     Ultrasound:          mins  97978;    Electrical Stimulation:         mins  84005 ( );  Dry Needling          mins self-pay    Timed Treatment:   39   mins   Total Treatment:     54   mins          Tracey Roy, PT  Physical Therapist

## 2021-05-25 DIAGNOSIS — E03.9 ACQUIRED HYPOTHYROIDISM: ICD-10-CM

## 2021-05-25 NOTE — TELEPHONE ENCOUNTER
Caller: Nicolle Chavez    Relationship: Self    Best call back number:     [phone]     Medication needed:   Requested Prescriptions     Pending Prescriptions Disp Refills   • levothyroxine (Synthroid) 100 MCG tablet 90 tablet 1     Sig: Take 1 tablet by mouth Daily.     When do you need the refill by:     ASAP    What additional details did the patient provide when requesting the medication:    PATIENT STATED SHE IS OUT OF THE MEDICATION    PATIENT ALSO STATED THAT [employer] WILL BE SENDING A REQUEST FOR PRESCRIPTION ORDER FOR THE ABOVE MENTIONED MEDICATION    Does the patient have less than a 3 day supply:  [x] Yes  [] No    What is the patient's preferred pharmacy:      [facility] - [facility], [place]    TELEPHONE CONTACT:    [phone]    FAX:    [phone]    DR ASIF DO

## 2021-05-26 RX ORDER — LEVOTHYROXINE SODIUM 0.1 MG/1
100 TABLET ORAL DAILY
Qty: 90 TABLET | Refills: 1
Start: 2021-05-26

## 2021-05-26 RX ORDER — LEVOTHYROXINE SODIUM 0.1 MG/1
100 TABLET ORAL DAILY
Qty: 90 TABLET | Refills: 1 | Status: SHIPPED | OUTPATIENT
Start: 2021-05-26 | End: 2022-02-22

## 2021-05-26 NOTE — TELEPHONE ENCOUNTER
Last Office Visit:  04/19/21  Next Office Visit: 08/20/21    Labs completed in past 6 months? yes  Labs completed in past year? yes    Last Refill Date: 04/19/21  Quantity:90  Refills:1    Too early for refill.    Pharmacy:     Please review pended refill request for any changes needed on refills or quantities. Thank you!

## 2021-05-26 NOTE — TELEPHONE ENCOUNTER
Patient called to see why her medication was denied. [facility] and stated they don't have any rx for her on file.

## 2021-06-02 ENCOUNTER — TREATMENT (OUTPATIENT)
Dept: PHYSICAL THERAPY | Facility: CLINIC | Age: 81
End: 2021-06-02

## 2021-06-02 DIAGNOSIS — M75.02 ADHESIVE CAPSULITIS OF LEFT SHOULDER: Primary | ICD-10-CM

## 2021-06-02 DIAGNOSIS — M75.52 BURSITIS OF LEFT SHOULDER: ICD-10-CM

## 2021-06-02 DIAGNOSIS — M75.42 IMPINGEMENT SYNDROME OF LEFT SHOULDER: ICD-10-CM

## 2021-06-02 PROCEDURE — 97110 THERAPEUTIC EXERCISES: CPT | Performed by: PHYSICAL THERAPIST

## 2021-06-02 PROCEDURE — 97530 THERAPEUTIC ACTIVITIES: CPT | Performed by: PHYSICAL THERAPIST

## 2021-06-02 PROCEDURE — 97140 MANUAL THERAPY 1/> REGIONS: CPT | Performed by: PHYSICAL THERAPIST

## 2021-06-02 NOTE — PROGRESS NOTES
Physical Therapy Daily Progress Note    Patient Information  Nicolle Chavez  1940      Visit # : 3    Nicolle Chavez reports 0/10 pain today at rest.  Patient reports that her shoulder was really painful yesterday but she states that she used her arm a lot. Patient reports that she is able to reach behind her back now and overhead now.        Objective Pt presents to PT today with no distress noted.     L shoulder PROM   Flexion: 124  Abduction: 96      See Exercise, Manual, and Modality Logs for complete treatment.     Assessment/Plan  Patient tolerated session well with no reports of pain with exercises. Patient tolerated manual therapy well with mild reports of discomfort. Patient continues to be limited in L shoulder ROM but is improving. Patient also has reports of increased L shoulder pain with increased activity. Patient was encouraged to continue HEP and utilize ice as it is needed.       Progress per Plan of Care  PT will continue to monitor patients signs and symptoms and progress patient as tolerated.     Visit Diagnoses:    ICD-10-CM ICD-9-CM   1. Adhesive capsulitis of left shoulder  M75.02 726.0   2. Bursitis of left shoulder  M75.52 726.10   3. Impingement syndrome of left shoulder  M75.42 726.2            Manual Therapy:    14     mins  53612;  Therapeutic Exercise:    20     mins  03713;     Neuromuscular Jhonny:        mins  54524;    Therapeutic Activity:     11     mins  00134;     Gait Training:           mins  13655;     Ultrasound:          mins  98506;    Electrical Stimulation:         mins  76234 ( );  Dry Needling          mins self-pay    Timed Treatment:   45   mins   Total Treatment:     61   mins          Tracey Roy PT  Physical Therapist

## 2021-06-09 ENCOUNTER — TREATMENT (OUTPATIENT)
Dept: PHYSICAL THERAPY | Facility: CLINIC | Age: 81
End: 2021-06-09

## 2021-06-09 DIAGNOSIS — M75.02 ADHESIVE CAPSULITIS OF LEFT SHOULDER: Primary | ICD-10-CM

## 2021-06-09 DIAGNOSIS — M75.52 BURSITIS OF LEFT SHOULDER: ICD-10-CM

## 2021-06-09 DIAGNOSIS — M75.42 IMPINGEMENT SYNDROME OF LEFT SHOULDER: ICD-10-CM

## 2021-06-09 PROCEDURE — 97530 THERAPEUTIC ACTIVITIES: CPT | Performed by: PHYSICAL THERAPIST

## 2021-06-09 PROCEDURE — 97110 THERAPEUTIC EXERCISES: CPT | Performed by: PHYSICAL THERAPIST

## 2021-06-09 PROCEDURE — 97140 MANUAL THERAPY 1/> REGIONS: CPT | Performed by: PHYSICAL THERAPIST

## 2021-06-09 NOTE — PROGRESS NOTES
Physical Therapy Daily Progress Note    Patient Information  Nicolle Chavez  1940      Visit # : 4    Nicolle Chavez reports 0/10 pain today at rest.  Patient reports that her shoulder doesn't bother her unless she reaches for things awkwardly or lifts something heavy. Patient reports that her shoulder is achy at night and it makes it difficult to sleep. Patient reports that she has been doing her exercises but has not been icing her shoulder at home.         Objective Pt presents to PT today with no distress noted.     L shoulder PROM  Flexion: 126  Abduction: 96      See Exercise, Manual, and Modality Logs for complete treatment.     Assessment/Plan  Patient tolerated session well with no reports of pain with exercises. Patient tolerated manual therapy well but continues to be pain limited with L shoulder motion. Patient was encouraged to continue her HEP to assist with return of functional ROM of L shoulder. Patient was also educated on the importance of utilization of ice to assist with symptom management.       Progress per Plan of Care  PT will continue to monitor patients signs and symptoms and progress patient as tolerated.     Visit Diagnoses:    ICD-10-CM ICD-9-CM   1. Adhesive capsulitis of left shoulder  M75.02 726.0   2. Bursitis of left shoulder  M75.52 726.10   3. Impingement syndrome of left shoulder  M75.42 726.2            Manual Therapy:    14     mins  34774;  Therapeutic Exercise:    18     mins  69874;     Neuromuscular Jhonny:        mins  03188;    Therapeutic Activity:     9     mins  36317;     Gait Training:           mins  08741;     Ultrasound:          mins  86340;    Electrical Stimulation:         mins  38068 ( );  Dry Needling          mins self-pay    Timed Treatment:   41   mins   Total Treatment:     59   mins          Tracey Roy PT  Physical Therapist

## 2021-06-16 ENCOUNTER — TREATMENT (OUTPATIENT)
Dept: PHYSICAL THERAPY | Facility: CLINIC | Age: 81
End: 2021-06-16

## 2021-06-16 DIAGNOSIS — M75.42 IMPINGEMENT SYNDROME OF LEFT SHOULDER: ICD-10-CM

## 2021-06-16 DIAGNOSIS — M75.52 BURSITIS OF LEFT SHOULDER: ICD-10-CM

## 2021-06-16 DIAGNOSIS — M75.02 ADHESIVE CAPSULITIS OF LEFT SHOULDER: Primary | ICD-10-CM

## 2021-06-16 PROCEDURE — 97110 THERAPEUTIC EXERCISES: CPT | Performed by: PHYSICAL THERAPIST

## 2021-06-16 PROCEDURE — 97140 MANUAL THERAPY 1/> REGIONS: CPT | Performed by: PHYSICAL THERAPIST

## 2021-06-16 PROCEDURE — 97530 THERAPEUTIC ACTIVITIES: CPT | Performed by: PHYSICAL THERAPIST

## 2021-06-16 NOTE — PROGRESS NOTES
Physical Therapy Daily Progress Note    Patient Information  Nicolle Chavez  1940      Visit # : 5    Nicolle Chavez reports 8/10 pain today at rest.  Patient reports that she has been cleaning all their cars. She states that this has really irritated her shoulder. She states that she feels like she really done too much.         Objective Pt presents to PT today with no distress noted.     L shoulder PROM   Flexion: 132  Abduction: 94      See Exercise, Manual, and Modality Logs for complete treatment.     Assessment/Plan  Patient tolerated session well with no reports of pain with exercises. She continues to tolerate manual therapy well with reports of mild pain at end range. Patient has tenderness in L upper trap. Following manual therapy patient had reports of reduced stiffness in L shoulder. Patient was educated on the importance of avoiding activities that irritate shoulder symptoms.      Progress per Plan of Care  PT will continue to monitor patients signs and symptoms and progress patient as tolerated.     Visit Diagnoses:    ICD-10-CM ICD-9-CM   1. Adhesive capsulitis of left shoulder  M75.02 726.0   2. Bursitis of left shoulder  M75.52 726.10   3. Impingement syndrome of left shoulder  M75.42 726.2            Manual Therapy:    12     mins  89138;  Therapeutic Exercise:    19     mins  91275;     Neuromuscular Jhonny:        mins  77799;    Therapeutic Activity:     11     mins  48795;     Gait Training:           mins  32252;     Ultrasound:          mins  68713;    Electrical Stimulation:         mins  45186 ( );  Dry Needling          mins self-pay    Timed Treatment:   42   mins   Total Treatment:     52   mins          Tracey Roy PT  Physical Therapist

## 2021-06-22 ENCOUNTER — TREATMENT (OUTPATIENT)
Dept: PHYSICAL THERAPY | Facility: CLINIC | Age: 81
End: 2021-06-22

## 2021-06-22 DIAGNOSIS — M75.02 ADHESIVE CAPSULITIS OF LEFT SHOULDER: Primary | ICD-10-CM

## 2021-06-22 DIAGNOSIS — M75.42 IMPINGEMENT SYNDROME OF LEFT SHOULDER: ICD-10-CM

## 2021-06-22 DIAGNOSIS — M75.52 BURSITIS OF LEFT SHOULDER: ICD-10-CM

## 2021-06-22 PROCEDURE — 97110 THERAPEUTIC EXERCISES: CPT | Performed by: PHYSICAL THERAPIST

## 2021-06-22 PROCEDURE — 97140 MANUAL THERAPY 1/> REGIONS: CPT | Performed by: PHYSICAL THERAPIST

## 2021-06-22 PROCEDURE — 97530 THERAPEUTIC ACTIVITIES: CPT | Performed by: PHYSICAL THERAPIST

## 2021-06-22 NOTE — PROGRESS NOTES
Physical Therapy Daily Progress Note    Patient Information  Nicolle Chavez  1940      Visit # : 6    Nicolle Chavez reports 0/10 pain today at rest.  Patient reports that she has been having more shoulder pain than usual. She states that she was in a rush to get groceries in the other day during the rain and feels like this could be what set the pain off. Patient reports prior to this her pain was much better and she was able to do most of her normal activities pain free.         Objective Pt presents to PT today with no distress noted.     L shoulder PROM  Flexion: 126  Abduction: 90       See Exercise, Manual, and Modality Logs for complete treatment.     Assessment/Plan  Patient tolerated session well with no reports of pain with exercises. Patient tolerated manual therapy well with reports of reduced stiffness in L shoulder. Patient with mild decreases in L shoulder passive ROM. Patient was educated on the importance of continuing HEP as well as using ice throughout the day to reduce symptoms.       Progress per Plan of Care  PT will continue to monitor patients signs and symptoms and progress patient as tolerated.     Visit Diagnoses:    ICD-10-CM ICD-9-CM   1. Adhesive capsulitis of left shoulder  M75.02 726.0   2. Bursitis of left shoulder  M75.52 726.10   3. Impingement syndrome of left shoulder  M75.42 726.2            Manual Therapy:    15     mins  64978;  Therapeutic Exercise:    18     mins  97152;     Neuromuscular Jhonny:        mins  07044;    Therapeutic Activity:     11     mins  01207;     Gait Training:           mins  92190;     Ultrasound:          mins  13203;    Electrical Stimulation:         mins  81580 ( );  Dry Needling          mins self-pay    Timed Treatment:   44   mins   Total Treatment:     59   mins          Tracey Roy PT  Physical Therapist

## 2021-06-29 ENCOUNTER — OFFICE VISIT (OUTPATIENT)
Dept: ORTHOPEDIC SURGERY | Facility: CLINIC | Age: 81
End: 2021-06-29

## 2021-06-29 VITALS
BODY MASS INDEX: 24.22 KG/M2 | WEIGHT: 131.6 LBS | DIASTOLIC BLOOD PRESSURE: 88 MMHG | HEART RATE: 67 BPM | SYSTOLIC BLOOD PRESSURE: 171 MMHG | HEIGHT: 62 IN

## 2021-06-29 DIAGNOSIS — M75.52 BURSITIS OF LEFT SHOULDER: ICD-10-CM

## 2021-06-29 DIAGNOSIS — M75.02 ADHESIVE CAPSULITIS OF LEFT SHOULDER: Primary | ICD-10-CM

## 2021-06-29 DIAGNOSIS — M75.42 IMPINGEMENT SYNDROME OF LEFT SHOULDER: ICD-10-CM

## 2021-06-29 PROCEDURE — 99212 OFFICE O/P EST SF 10 MIN: CPT | Performed by: ORTHOPAEDIC SURGERY

## 2021-06-29 NOTE — PROGRESS NOTES
Answers for HPI/ROS submitted by the patient on 6/27/2021  Please describe your symptoms.: Left shoulder pain, therapy used, follow up to sessions  Have you had these symptoms before?: Yes  How long have you been having these symptoms?: Greater than 2 weeks  What is the primary reason for your visit?: Other                                                                    [facility] Orthopaedic Surgery Office Follow Up       Office Follow Up Visit       Patient Name: Nicolle Chavez    Chief Complaint:   Chief Complaint   Patient presents with   • Follow-up     2 month f/u--Adhesive capsulitis of left shoulder        Referring Physician: No ref. provider found    History of Present Illness:   It has been 2  month(s) since Nicolle Chavez's last visit. Nicolle Chavez returns to clinic today for F/U: follow-up of leftBody Part: shoulderReason: pain. The issue has been ongoing for 2 month(s). Nicolle Chavez rates HIS/HER: herpain at 7/10 on the pain scale. Previous/current treatments: physical therapy. Current symptoms:Symptoms: pain and stiffness. The pain is worse with working, leisure and lying on affected side; ice improves the pain. Overall, he/she: sheis doing better. I have reviewed the patient's history of present illness as noted/entered above.    I have reviewed the patient's past medical history, surgical history, social history, family history, medications, and allergies as noted in the electronic medical record and as noted/entered.  I have reviewed the patient's review of systems as noted/enter and updated as noted in the patient's HPI.      LEFT SHOULDER  6/29/2021  Overall improvements noted but worse today after yard work 2 days ago, 7 acres  PT helping  's health in decline    Doing great with Tracey Roy at [facility][1] PT      PRIOR:  Left shoulder pain and stiffness  Worsening pain since January, possible fall history around [place], [employer]     Retired:  education, college prof, principal, Dept of [employer]     Enjoys: aspen  's health declining unfortunately; she a lot of land and a large home -remains very active      Subjective   Subjective      Review of Systems     Past Medical History:   Past Medical History:   Diagnosis Date   • Abdominal pain     Last Impression: 26 May 2015  Improving. The patient states she had a history of lower   abdominal pain after taking antibiotics for UTI, but this has improved.  Sanjiv Mak (Gastroenterology)   • Abnormal weight gain    • Anxiety 12/12/2016   • Cataract    • Cataract 2014   • Cataracts, bilateral    • Coronary artery disease 12/12/2016    Field ST-elevation myocardial infarction, 11/25/2015, with associated chest pain, jaw pain, diaphoresis, and dyspnea; inferior-anterior ST elevation by outside telemetry strips.  Emergent cardiac catheterization, Dr. Isael Rosenbaum:  Status post drug-eluting stent to the proximal right coronary artery using a 2.25 x 23 mm Xience Alpine stent.  Ejection fraction 55% to 60%.    • Dysphagia 8/15/2016    Last Impression: 26 May 2015  History of intermittent dysphagia symptoms. Differentials     include esophageal dysmotility, Schatzki's ring.  Sanjiv Mak (Gastroenterology)     • Gout     2012   • Gout 2012   • H/O bone density study    • Headache    • Headache    • High cholesterol     1995   • Hyperparathyroidism (CMS/HCC)     2009   • Hypertension    • Hypothyroidism    • Myalgia and myositis 8/15/2016   • Myocardial infarction (CMS/[facility])    • Palpitations 9/25/2017   • Parathyroid disorder (CMS/[facility]) 12/12/2016    with a history of a left parathyroid parathyroidectomy.    • Peptic ulcer disease     and bleeding ulcer approximately 12 years ago; incomplete database.    • Sleep apnea        Past Surgical History:   Past Surgical History:   Procedure Laterality Date   • APPENDECTOMY     • BREAST BIOPSY Right 1995    Ultrasound guided needle biopsy   •  COLONOSCOPY     • HYSTERECTOMY Bilateral 1978   • OOPHORECTOMY     • OTHER SURGICAL HISTORY      Ulcer repair   • PARATHYROIDECTOMY Left        Family History:   Family History   Problem Relation Age of Onset   • Anemia Mother    • Arthritis Mother    • Heart disease Mother    • Hypertension Mother    • Stroke Mother    • Cancer Father         stomach   • Breast cancer Neg Hx    • Ovarian cancer Neg Hx    • Colon cancer Neg Hx    • Endometrial cancer Neg Hx        Social History:   Social History     Socioeconomic History   • Marital status:      Spouse name: Not on file   • Number of children: Not on file   • Years of education: Not on file   • Highest education level: Not on file   Tobacco Use   • Smoking status: Never Smoker   • Smokeless tobacco: Never Used   Substance and Sexual Activity   • Alcohol use: No   • Drug use: No   • Sexual activity: Defer       Medications:   Current Outpatient Medications:   •  aspirin 81 MG tablet, Take 1 tablet by mouth daily., Disp: , Rfl:   •  Azilsartan Medoxomil (Edarbi) 40 MG tablet, Take 40 mg by mouth Daily., Disp: 90 tablet, Rfl: 3  •  Bystolic 10 MG tablet, TAKE 1 TABLET BY MOUTH EVERY DAY, Disp: 30 tablet, Rfl: 5  •  cinacalcet (SENSIPAR) 60 MG tablet, Take 1 tablet by mouth daily., Disp: , Rfl:   •  hydroCHLOROthiazide (MICROZIDE) 12.5 MG capsule, Take 1 capsule by mouth Daily., Disp: 90 capsule, Rfl: 3  •  levothyroxine (Synthroid) 100 MCG tablet, Take 1 tablet by mouth Daily., Disp: 90 tablet, Rfl: 1  •  Multiple Vitamins-Minerals (PRESERVISION AREDS 2 PO), Take 1 tablet by mouth 2 (Two) Times a Day., Disp: , Rfl:   •  nitroglycerin (NITROSTAT) 0.4 MG SL tablet, Place 1 tablet under the tongue Every 5 (Five) Minutes As Needed for Chest Pain. Take no more than 3 doses in 15 minutes., Disp: 25 tablet, Rfl: 5  •  pantoprazole (PROTONIX) 40 MG EC tablet, Take 40 mg by mouth Daily., Disp: , Rfl:   •  PREMARIN 0.625 MG/GM vaginal cream, 1 (One) Time Per Week., Disp:  ", Rfl:   •  Repatha SureClick 140 MG/ML solution auto-injector, INJECT 140MG(1ML) UNDER THE SKIN EVERY 2 WEEKS, Disp: 6 mL, Rfl: 3  •  Unable to find, 1 each 1 (One) Time. Med Name: Eye injections for Macular Degeneration.     Eylea every 6-8 weeks., Disp: , Rfl:     Allergies:   Allergies   Allergen Reactions   • Pravachol [Pravastatin Sodium] Myalgia   • Statins Myalgia   • Zocor [Simvastatin] Myalgia   • Aspirin Rash   • Bactrim [Sulfamethoxazole-Trimethoprim] Rash   • Crestor [Rosuvastatin Calcium] Myalgia   • Lipitor [Atorvastatin] Myalgia       The following portions of the patient's history were reviewed and updated as appropriate: allergies, current medications, past family history, past medical history, past social history, past surgical history and problem list.        Objective    Objective      Vital Signs:   Vitals:    06/29/21 1047   BP: 171/88   Pulse: 67   Weight: 59.7 kg (131 lb 9.6 oz)   Height: 157.5 cm (62.01\")       Ortho Exam:  LEFT SHOULDER ROM improved  Some pain with IR  slt intact  Good strength   Well perfused    Results Review:  Imaging Results (Last 24 Hours)     ** No results found for the last 24 hours. **          Procedures          Assessment / Plan      Assessment/Plan:   Problem List Items Addressed This Visit        Musculoskeletal and Injuries    Adhesive capsulitis of left shoulder - Primary    Relevant Orders    Ambulatory Referral to Physical Therapy Evaluate and treat    Bursitis of left shoulder    Relevant Orders    Ambulatory Referral to Physical Therapy Evaluate and treat    Impingement syndrome of left shoulder    Relevant Orders    Ambulatory Referral to Physical Therapy Evaluate and treat          LEFT SHOULDER  Doing better  Continue with PT -- PT Rx    Follow Up: 2-3 months if not improving      Gildardo Harris MD, FAAOS  Orthopedic Surgeon  Fellowship Trained Shoulder and Elbow Surgeon  [facility]  Orthopedics and Sports Medicine  [facility] [place] " [facility], [address] [place] [address]. [phone]    06/29/21  11:12 EDT    Please note that portions of this note may have been completed with a voice recognition program. Efforts were made to edit the dictations, but occasionally words are mistranscribed.

## 2021-06-30 ENCOUNTER — TREATMENT (OUTPATIENT)
Dept: PHYSICAL THERAPY | Facility: CLINIC | Age: 81
End: 2021-06-30

## 2021-06-30 DIAGNOSIS — M75.02 ADHESIVE CAPSULITIS OF LEFT SHOULDER: Primary | ICD-10-CM

## 2021-06-30 PROCEDURE — 97110 THERAPEUTIC EXERCISES: CPT | Performed by: PHYSICAL THERAPIST

## 2021-06-30 PROCEDURE — 97140 MANUAL THERAPY 1/> REGIONS: CPT | Performed by: PHYSICAL THERAPIST

## 2021-06-30 PROCEDURE — 97530 THERAPEUTIC ACTIVITIES: CPT | Performed by: PHYSICAL THERAPIST

## 2021-06-30 NOTE — PROGRESS NOTES
Physical Therapy Daily Progress Note    Patient Information  Nicolle Chavez  1940      Visit # : 7    Nicolle Chavez reports 1/10 pain today at rest.  Patient reports that her shoulder is a little sore today but she has been very active. Patient reports that her pain comes and goes but states that her ROM does seem to be improving.         Objective Pt presents to PT today with no distress noted.     L shoulder PROM   Flexion: 133  Abduction: 94      See Exercise, Manual, and Modality Logs for complete treatment.     Assessment/Plan  Patient tolerated session well with no reports of pain with exercises. Patient was lightly progressed today with mild fatigue noted. Patient demonstrated small improvements in L shoulder PROM today. Patient was encouraged to continue HEP and to use L UE in day to day life. Patient encouraged to avoid heavy lifting with L UE to assist with symptom reduction.       Progress per Plan of Care  PT will continue to monitor patients signs and symptoms and progress patient as tolerated.    Visit Diagnoses:    ICD-10-CM ICD-9-CM   1. Adhesive capsulitis of left shoulder  M75.02 726.0            Manual Therapy:    18     mins  56264;  Therapeutic Exercise:    19     mins  28647;     Neuromuscular Jhonny:        mins  84063;    Therapeutic Activity:     11     mins  39167;     Gait Training:           mins  85998;     Ultrasound:          mins  90456;    Electrical Stimulation:         mins  42438 ( );  Dry Needling          mins self-pay    Timed Treatment:   48   mins   Total Treatment:     63   mins          Tracey Roy PT  Physical Therapist

## 2021-07-08 ENCOUNTER — TREATMENT (OUTPATIENT)
Dept: PHYSICAL THERAPY | Facility: CLINIC | Age: 81
End: 2021-07-08

## 2021-07-08 DIAGNOSIS — M75.52 BURSITIS OF LEFT SHOULDER: ICD-10-CM

## 2021-07-08 DIAGNOSIS — M75.42 IMPINGEMENT SYNDROME OF LEFT SHOULDER: ICD-10-CM

## 2021-07-08 DIAGNOSIS — M75.02 ADHESIVE CAPSULITIS OF LEFT SHOULDER: Primary | ICD-10-CM

## 2021-07-08 PROCEDURE — 97530 THERAPEUTIC ACTIVITIES: CPT | Performed by: PHYSICAL THERAPIST

## 2021-07-08 PROCEDURE — 97140 MANUAL THERAPY 1/> REGIONS: CPT | Performed by: PHYSICAL THERAPIST

## 2021-07-08 PROCEDURE — 97110 THERAPEUTIC EXERCISES: CPT | Performed by: PHYSICAL THERAPIST

## 2021-07-08 NOTE — PROGRESS NOTES
Physical Therapy Daily Progress Note    Patient Information  Nicolle Chavez  1940      Visit # : 8    Nicolle Chavez reports 5/10 pain today at rest.  Patient reports that she has been having good and bad days. She reports that she started having bad days with the shoulder after she had been using a . She states that she has not used the  for a few days and states that her shoulder is feeling better. She states that she has also been helping her  put a new mailbox up. She states that using ice at home seems to really help when her shoulder gets irritated.         Objective Pt presents to PT today with no distress noted.    L shoulder PROM   Flexion: 135  Abduction: 94      See Exercise, Manual, and Modality Logs for complete treatment.     Assessment/Plan  Patient tolerated session well with mild increases in pain with exercises. Patient tolerated manual therapy well with mild reports of pain at end range. Patient had mild discomfort in R bicep with passive shoulder abduction. Patient has moderate tenderness to palpation in R bicep today that seems to be soreness from work she has been completing at home. Patient was encouraged to continue HEP and utilize ice as needed to assist with symptom management.      Progress per Plan of Care  PT will continue to monitor patients signs and symptoms and progress patient as tolerated    Visit Diagnoses:    ICD-10-CM ICD-9-CM   1. Adhesive capsulitis of left shoulder  M75.02 726.0   2. Bursitis of left shoulder  M75.52 726.10   3. Impingement syndrome of left shoulder  M75.42 726.2            Manual Therapy:    19     mins  14004;  Therapeutic Exercise:    18     mins  44451;     Neuromuscular Jhonny:        mins  16444;    Therapeutic Activity:     11     mins  27555;     Gait Training:           mins  03797;     Ultrasound:          mins  07450;    Electrical Stimulation:         mins  45875 ( );  Dry Needling          mins  self-pay    Timed Treatment:   48   mins   Total Treatment:     58   mins          Tracey Roy, PT  Physical Therapist

## 2021-07-13 ENCOUNTER — TREATMENT (OUTPATIENT)
Dept: PHYSICAL THERAPY | Facility: CLINIC | Age: 81
End: 2021-07-13

## 2021-07-13 DIAGNOSIS — M75.02 ADHESIVE CAPSULITIS OF LEFT SHOULDER: Primary | ICD-10-CM

## 2021-07-13 DIAGNOSIS — M75.42 IMPINGEMENT SYNDROME OF LEFT SHOULDER: ICD-10-CM

## 2021-07-13 DIAGNOSIS — M75.52 BURSITIS OF LEFT SHOULDER: ICD-10-CM

## 2021-07-13 PROCEDURE — 97140 MANUAL THERAPY 1/> REGIONS: CPT | Performed by: PHYSICAL THERAPIST

## 2021-07-13 PROCEDURE — 97530 THERAPEUTIC ACTIVITIES: CPT | Performed by: PHYSICAL THERAPIST

## 2021-07-13 PROCEDURE — 97110 THERAPEUTIC EXERCISES: CPT | Performed by: PHYSICAL THERAPIST

## 2021-07-13 NOTE — PROGRESS NOTES
Physical Therapy Daily Progress Note    Patient Information  Nicolle Chavez  1940      Visit # : 9    Nicolle Chavez reports 4/10 pain today at rest.  Patient reports that her shoulder pain fluctuates. She states she knows she is doing too much at home but she has some things that she needs to do. Patient reports all her pain has been manageable and the sharp, shooting pain has not returned.        Objective Pt presents to PT today with no distress noted.     L shoulder PROM   Flexion: 136  Abduction: 96      See Exercise, Manual, and Modality Logs for complete treatment.     Assessment/Plan  Patient tolerated session well with no reports of pain with exercises. Patient tolerated manual therapy well with reports of increased pain at end range. Patient has mild tenderness in L bicep musculature today. Patient continues to demonstrate mild improvements in L shoulder PROM. Patient encouraged to limit activities that irritate L shoulder and continue HEP as prescribed.       Progress per Plan of Care  PT will contine    Visit Diagnoses:    ICD-10-CM ICD-9-CM   1. Adhesive capsulitis of left shoulder  M75.02 726.0   2. Bursitis of left shoulder  M75.52 726.10   3. Impingement syndrome of left shoulder  M75.42 726.2            Manual Therapy:    18     mins  79906;  Therapeutic Exercise:    19     mins  14098;     Neuromuscular Jhonny:        mins  72970;    Therapeutic Activity:     12     mins  70080;     Gait Training:           mins  12003;     Ultrasound:          mins  78690;    Electrical Stimulation:         mins  46670 ( );  Dry Needling          mins self-pay    Timed Treatment:   49   mins   Total Treatment:     58   mins          Tracey Roy PT  Physical Therapist

## 2021-07-19 ENCOUNTER — TREATMENT (OUTPATIENT)
Dept: PHYSICAL THERAPY | Facility: CLINIC | Age: 81
End: 2021-07-19

## 2021-07-19 DIAGNOSIS — M75.42 IMPINGEMENT SYNDROME OF LEFT SHOULDER: ICD-10-CM

## 2021-07-19 DIAGNOSIS — M75.52 BURSITIS OF LEFT SHOULDER: ICD-10-CM

## 2021-07-19 DIAGNOSIS — M75.02 ADHESIVE CAPSULITIS OF LEFT SHOULDER: Primary | ICD-10-CM

## 2021-07-19 PROCEDURE — 97140 MANUAL THERAPY 1/> REGIONS: CPT | Performed by: PHYSICAL THERAPIST

## 2021-07-19 PROCEDURE — 97110 THERAPEUTIC EXERCISES: CPT | Performed by: PHYSICAL THERAPIST

## 2021-07-19 PROCEDURE — 97530 THERAPEUTIC ACTIVITIES: CPT | Performed by: PHYSICAL THERAPIST

## 2021-07-19 NOTE — PROGRESS NOTES
Physical Therapy Daily Progress Note    Patient Information  Nicolle Chavez  1940      Visit # : 10    Nicolle Chavez reports 2/10 pain today at rest.  Patient reports that her shoulder continues to be a little sore into her bicep but she has continued to work outside at home. She states that she has been trying to allow her shoulder rest breaks to assist with some of the soreness. Patient reports that she is able to do more with less shoulder pain but feels like she is over doing her work at home.         Objective Pt presents to PT today with no distress noted.     L shoulder PROM   Flexion: 138  Abduction: 96      See Exercise, Manual, and Modality Logs for complete treatment.     Assessment/Plan  Patient tolerated session well with mild increases in shoulder pain with exercises. Patient continues to tolerate manual therapy well. She complains of moderate muscle soreness in L bicep that is likely due to activity level at home. Patient encouraged to continue HEP and utilize ice as needed to assist with symptom management. She was also educated on the importance of allowing L shoulder to rest and avoiding activities that irritate symptoms.       Progress per Plan of Care  PT will continue to monitor patients signs and symptoms and progress patient as tolerated.     Visit Diagnoses:    ICD-10-CM ICD-9-CM   1. Adhesive capsulitis of left shoulder  M75.02 726.0   2. Bursitis of left shoulder  M75.52 726.10   3. Impingement syndrome of left shoulder  M75.42 726.2            Manual Therapy:    16     mins  35605;  Therapeutic Exercise:    20     mins  58674;     Neuromuscular Jhonny:        mins  15882;    Therapeutic Activity:     11     mins  46773;     Gait Training:           mins  12497;     Ultrasound:          mins  41111;    Electrical Stimulation:         mins  63816 ( );  Dry Needling          mins self-pay    Timed Treatment:   47   mins   Total Treatment:     60   mins          Tracey  Clonce, PT  Physical Therapist

## 2021-07-26 ENCOUNTER — TREATMENT (OUTPATIENT)
Dept: PHYSICAL THERAPY | Facility: CLINIC | Age: 81
End: 2021-07-26

## 2021-07-26 DIAGNOSIS — M75.52 BURSITIS OF LEFT SHOULDER: ICD-10-CM

## 2021-07-26 DIAGNOSIS — M75.02 ADHESIVE CAPSULITIS OF LEFT SHOULDER: Primary | ICD-10-CM

## 2021-07-26 DIAGNOSIS — M75.42 IMPINGEMENT SYNDROME OF LEFT SHOULDER: ICD-10-CM

## 2021-07-26 PROCEDURE — 97530 THERAPEUTIC ACTIVITIES: CPT | Performed by: PHYSICAL THERAPIST

## 2021-07-26 PROCEDURE — 97140 MANUAL THERAPY 1/> REGIONS: CPT | Performed by: PHYSICAL THERAPIST

## 2021-07-26 PROCEDURE — 97110 THERAPEUTIC EXERCISES: CPT | Performed by: PHYSICAL THERAPIST

## 2021-07-26 NOTE — PROGRESS NOTES
Physical Therapy Daily Progress Note    Patient Information  Niclole Chavez  1940      Visit # : 11    Nicolle Chavez reports 1/10 pain today at rest.  Patient reports that she had a rough week with her shoulder. Patient reports that following last session her shoulder became sore and has only improved the last couple days. She states that she has been trying to relax the shoulder as much as possible. She states that before she was doing a lot of yard work and states she may have slept on the shoulder wrong. Although patient reports increased shoulder pain she states that the really bad pain that occurs in her L shoulder blade into her back has not been present in a very long time.        Objective Pt presents to PT today with no distress noted.     L shoulder PROM   Flexion: 142  Abduction: 98      See Exercise, Manual, and Modality Logs for complete treatment.     Assessment/Plan  Patient tolerated session well with no increases in pain with exercises. Patient tolerated manual therapy well and only had complaints of increased pain at end range of passive abduction. Patient was educated on the importance of avoiding activities that irritate symptoms. Patient encouraged to continue HEP and utilize ice frequently to assist with reduction of symptoms.       Progress per Plan of Care  PT will continue to monitor patients signs and symptoms and progress patient as tolerated.    Visit Diagnoses:    ICD-10-CM ICD-9-CM   1. Adhesive capsulitis of left shoulder  M75.02 726.0   2. Bursitis of left shoulder  M75.52 726.10   3. Impingement syndrome of left shoulder  M75.42 726.2            Manual Therapy:    14     mins  57524;  Therapeutic Exercise:    21     mins  96229;     Neuromuscular Jhonny:        mins  61865;    Therapeutic Activity:     13     mins  33433;     Gait Training:           mins  09893;     Ultrasound:          mins  70673;    Electrical Stimulation:         mins  84746 ( );  Dry  Needling          mins self-pay    Timed Treatment:   48   mins   Total Treatment:     58   mins          Tracey Roy, PT  Physical Therapist

## 2021-08-11 ENCOUNTER — TREATMENT (OUTPATIENT)
Dept: PHYSICAL THERAPY | Facility: CLINIC | Age: 81
End: 2021-08-11

## 2021-08-11 DIAGNOSIS — M75.52 BURSITIS OF LEFT SHOULDER: ICD-10-CM

## 2021-08-11 DIAGNOSIS — M75.02 ADHESIVE CAPSULITIS OF LEFT SHOULDER: Primary | ICD-10-CM

## 2021-08-11 DIAGNOSIS — M75.42 IMPINGEMENT SYNDROME OF LEFT SHOULDER: ICD-10-CM

## 2021-08-11 PROCEDURE — 97140 MANUAL THERAPY 1/> REGIONS: CPT | Performed by: PHYSICAL THERAPIST

## 2021-08-11 PROCEDURE — 97110 THERAPEUTIC EXERCISES: CPT | Performed by: PHYSICAL THERAPIST

## 2021-08-11 PROCEDURE — 97530 THERAPEUTIC ACTIVITIES: CPT | Performed by: PHYSICAL THERAPIST

## 2021-08-11 NOTE — PROGRESS NOTES
Physical Therapy Daily Progress Note    Patient Information  Nicolle Chavez  1940      Visit # : 12    Nicolle Chavez reports 5/10 pain today at rest.  Patient reports that her  fell last week and she tried to help him up which hurt her shoulder some. She also reports that she has been doing more yard work. She states that she stopped the yard work when she noticed increased pain but states that pain has lingered in the shoulder. Patient reports that she has not had any sharp pain that goes into her back.         Objective Pt presents to PT today with no distress noted.     L shoulder PROM   Flexion: 138  Abduction: 90      See Exercise, Manual, and Modality Logs for complete treatment.     Assessment/Plan  Patient tolerated session well with no reports of pain with exercises. Patient did have reports of increased pain with manual therapy today. She also demonstrated a reduction in L shoulder PROM. Pain improved as manual therapy progressed but was not abolished. Patient was encouraged to continue HEP and utilize ice as needed at home. Patient educated on the importance of avoiding activities that irritate L shoulder symptoms.       Progress per Plan of Care  PT will continue to monitor patients signs and symptoms and progress patient as tolerated.     Visit Diagnoses:    ICD-10-CM ICD-9-CM   1. Adhesive capsulitis of left shoulder  M75.02 726.0   2. Bursitis of left shoulder  M75.52 726.10   3. Impingement syndrome of left shoulder  M75.42 726.2            Manual Therapy:    12     mins  53768;  Therapeutic Exercise:    19     mins  43830;     Neuromuscular Jhonny:        mins  00847;    Therapeutic Activity:     14     mins  06649;     Gait Training:           mins  60842;     Ultrasound:          mins  62274;    Electrical Stimulation:         mins  31277 ( );  Dry Needling          mins self-pay    Timed Treatment:   45   mins   Total Treatment:     55   mins          Tracey Roy  PT  Physical Therapist

## 2021-08-17 ENCOUNTER — TREATMENT (OUTPATIENT)
Dept: PHYSICAL THERAPY | Facility: CLINIC | Age: 81
End: 2021-08-17

## 2021-08-17 DIAGNOSIS — M75.02 ADHESIVE CAPSULITIS OF LEFT SHOULDER: Primary | ICD-10-CM

## 2021-08-17 DIAGNOSIS — M75.52 BURSITIS OF LEFT SHOULDER: ICD-10-CM

## 2021-08-17 DIAGNOSIS — M75.42 IMPINGEMENT SYNDROME OF LEFT SHOULDER: ICD-10-CM

## 2021-08-17 PROCEDURE — 97110 THERAPEUTIC EXERCISES: CPT | Performed by: PHYSICAL THERAPIST

## 2021-08-17 PROCEDURE — 97140 MANUAL THERAPY 1/> REGIONS: CPT | Performed by: PHYSICAL THERAPIST

## 2021-08-17 NOTE — PROGRESS NOTES
Physical Therapy Daily Progress Note    Patient Information  Nicolle Chavez  1940      Visit # : 13    Nicolle Chavez reports 2/10 pain today at rest.  Patient reports that her shoulder has had good days and bad days. She states that she has been doing less house and yard work but it seems to still be hurting sometimes. Patient reports that she has noticed pain putting on her seatbelt with the L shoulder.         Objective Pt presents to PT today with no distress noted.     L shoulder PROM   Flexion: 136  Abduction: 90      See Exercise, Manual, and Modality Logs for complete treatment.     Assessment/Plan  Patient tolerated session well with no increases in shoulder pain with exercises. Patient tolerated manual therapy fair as she had increased pain at end range with all PROM of L shoulder. Patient is also demonstrating reduced  ROM in L shoulder. Patient encouraged to return to stretching exercises in HEP to assist with increased tightness and pain in the L shoulder. Patient encouraged to utilize ice to assist with symptom management.      Progress per Plan of Care  PT will continue to monitor patients signs and symptoms and progress patient as tolerated.     Visit Diagnoses:    ICD-10-CM ICD-9-CM   1. Adhesive capsulitis of left shoulder  M75.02 726.0   2. Bursitis of left shoulder  M75.52 726.10   3. Impingement syndrome of left shoulder  M75.42 726.2            Manual Therapy:    12     mins  10926;  Therapeutic Exercise:    27     mins  54987;     Neuromuscular Jhonny:        mins  54047;    Therapeutic Activity:          mins  90147;     Gait Training:           mins  93460;     Ultrasound:          mins  62097;    Electrical Stimulation:         mins  66490 ( );  Dry Needling          mins self-pay    Timed Treatment:   39   mins   Total Treatment:     49   mins          Tracey Roy PT  Physical Therapist

## 2021-08-20 ENCOUNTER — OFFICE VISIT (OUTPATIENT)
Dept: INTERNAL MEDICINE | Facility: CLINIC | Age: 81
End: 2021-08-20

## 2021-08-20 VITALS
OXYGEN SATURATION: 96 % | HEART RATE: 57 BPM | HEIGHT: 62 IN | DIASTOLIC BLOOD PRESSURE: 80 MMHG | SYSTOLIC BLOOD PRESSURE: 130 MMHG | BODY MASS INDEX: 25.03 KG/M2 | WEIGHT: 136 LBS

## 2021-08-20 DIAGNOSIS — E03.9 ACQUIRED HYPOTHYROIDISM: ICD-10-CM

## 2021-08-20 DIAGNOSIS — I25.10 CORONARY ARTERY DISEASE INVOLVING NATIVE CORONARY ARTERY OF NATIVE HEART WITHOUT ANGINA PECTORIS: ICD-10-CM

## 2021-08-20 DIAGNOSIS — K21.00 GASTROESOPHAGEAL REFLUX DISEASE WITH ESOPHAGITIS WITHOUT HEMORRHAGE: ICD-10-CM

## 2021-08-20 DIAGNOSIS — I10 ESSENTIAL HYPERTENSION: Primary | ICD-10-CM

## 2021-08-20 DIAGNOSIS — E78.5 HYPERLIPIDEMIA LDL GOAL <100: ICD-10-CM

## 2021-08-20 PROCEDURE — 99214 OFFICE O/P EST MOD 30 MIN: CPT | Performed by: INTERNAL MEDICINE

## 2021-08-20 NOTE — PROGRESS NOTES
Subjective   Nicolle Chavez is a 80 y.o. female.   Chief Complaint   Patient presents with   • Coronary Artery Disease   • Heartburn   • Hypertension   • Hyperlipidemia   • Hypothyroidism       History of Present Illness   Here for f/u on chronic conditions: HTN, HLP, CAD, hypothyroid, and GERD. HTN controlled with Edarbi, bystolic, and hctz. HLP controlled with Repatha. CAD controlled with ASA and Repatha. GERD controlled with Protonix. Hypothyroid controlled with synthroid. No CP or SOA. No HA. No heart palpitations. No fatigue.   The following portions of the patient's history were reviewed and updated as appropriate: allergies, current medications, past family history, past medical history, past social history, past surgical history and problem list.  Past Medical History:   Diagnosis Date   • Abdominal pain     Last Impression: 26 May 2015  Improving. The patient states she had a history of lower   abdominal pain after taking antibiotics for UTI, but this has improved.  Sanjiv Mak (Gastroenterology)   • Abnormal weight gain    • Anxiety 12/12/2016   • Cataract    • Cataract 2014   • Cataracts, bilateral    • Coronary artery disease 12/12/2016    Field ST-elevation myocardial infarction, 11/25/2015, with associated chest pain, jaw pain, diaphoresis, and dyspnea; inferior-anterior ST elevation by outside telemetry strips.  Emergent cardiac catheterization, Dr. Isael Rosenbaum:  Status post drug-eluting stent to the proximal right coronary artery using a 2.25 x 23 mm Xience Alpine stent.  Ejection fraction 55% to 60%.    • Dysphagia 8/15/2016    Last Impression: 26 May 2015  History of intermittent dysphagia symptoms. Differentials     include esophageal dysmotility, Schatzki's ring.  Sanjiv Mak (Gastroenterology)     • Gout     2012   • Gout 2012   • H/O bone density study    • Headache    • Headache    • High cholesterol     1995   • Hyperparathyroidism (CMS/HCC)     2009   • Hypertension     • Hypothyroidism    • Myalgia and myositis 8/15/2016   • Myocardial infarction (CMS/formerly [facility])    • Palpitations 9/25/2017   • Parathyroid disorder (CMS/HCC) 12/12/2016    with a history of a left parathyroid parathyroidectomy.    • Peptic ulcer disease     and bleeding ulcer approximately 12 years ago; incomplete database.    • Sleep apnea      Past Surgical History:   Procedure Laterality Date   • APPENDECTOMY     • BREAST BIOPSY Right 1995    Ultrasound guided needle biopsy   • COLONOSCOPY     • HYSTERECTOMY Bilateral 1978   • OOPHORECTOMY     • OTHER SURGICAL HISTORY      Ulcer repair   • PARATHYROIDECTOMY Left      Family History   Problem Relation Age of Onset   • Anemia Mother    • Arthritis Mother    • Heart disease Mother    • Hypertension Mother    • Stroke Mother    • Cancer Father         stomach   • Breast cancer Neg Hx    • Ovarian cancer Neg Hx    • Colon cancer Neg Hx    • Endometrial cancer Neg Hx      Social History     Socioeconomic History   • Marital status:      Spouse name: Not on file   • Number of children: Not on file   • Years of education: Not on file   • Highest education level: Not on file   Tobacco Use   • Smoking status: Never Smoker   • Smokeless tobacco: Never Used   Substance and Sexual Activity   • Alcohol use: No   • Drug use: No   • Sexual activity: Defer     Current Outpatient Medications on File Prior to Visit   Medication Sig Dispense Refill   • aspirin 81 MG tablet Take 1 tablet by mouth daily.     • Azilsartan Medoxomil (Edarbi) 40 MG tablet Take 40 mg by mouth Daily. 90 tablet 3   • Bystolic 10 MG tablet TAKE 1 TABLET BY MOUTH EVERY DAY 30 tablet 5   • cinacalcet (SENSIPAR) 60 MG tablet Take 1 tablet by mouth daily.     • hydroCHLOROthiazide (MICROZIDE) 12.5 MG capsule Take 1 capsule by mouth Daily. 90 capsule 3   • levothyroxine (Synthroid) 100 MCG tablet Take 1 tablet by mouth Daily. 90 tablet 1   • nitroglycerin (NITROSTAT) 0.4 MG SL tablet Place 1 tablet under the  "tongue Every 5 (Five) Minutes As Needed for Chest Pain. Take no more than 3 doses in 15 minutes. 25 tablet 5   • pantoprazole (PROTONIX) 40 MG EC tablet Take 40 mg by mouth Daily.     • PREMARIN 0.625 MG/GM vaginal cream 1 (One) Time Per Week.     • Repatha SureClick 140 MG/ML solution auto-injector INJECT 140MG(1ML) UNDER THE SKIN EVERY 2 WEEKS 6 mL 3   • Unable to find 1 each 1 (One) Time. Med Name: Eye injections for Macular Degeneration.     Eylea every 6-8 weeks.     • [DISCONTINUED] Multiple Vitamins-Minerals (PRESERVISION AREDS 2 PO) Take 1 tablet by mouth 2 (Two) Times a Day.       No current facility-administered medications on file prior to visit.       Review of Systems   Constitutional: Negative for activity change, appetite change, chills, diaphoresis, fatigue, fever and unexpected weight change.   HENT: Negative for congestion, ear discharge, ear pain, sinus pressure, sneezing and sore throat.    Respiratory: Negative for cough, chest tightness, shortness of breath and wheezing.    Cardiovascular: Negative for chest pain, palpitations and leg swelling.   Gastrointestinal: Negative for abdominal pain, constipation, diarrhea, nausea and vomiting.   Endocrine: Negative for cold intolerance, heat intolerance, polydipsia and polyphagia.   Genitourinary: Negative for dysuria, flank pain, frequency, hematuria and urgency.   Skin: Negative for color change, pallor and rash.   Neurological: Negative for seizures, speech difficulty, numbness and headaches.   Psychiatric/Behavioral: Negative for agitation, confusion, decreased concentration and sleep disturbance. The patient is not nervous/anxious.      /80   Pulse 57   Ht 157.5 cm (62\")   Wt 61.7 kg (136 lb)   SpO2 96%   BMI 24.87 kg/m²     Objective   Physical Exam  Vitals and nursing note reviewed.   Constitutional:       Appearance: She is well-developed.   HENT:      Head: Normocephalic.      Right Ear: External ear normal.      Left Ear: External " ear normal.      Nose: Nose normal.   Eyes:      Conjunctiva/sclera: Conjunctivae normal.      Pupils: Pupils are equal, round, and reactive to light.   Neck:      Thyroid: No thyromegaly.      Vascular: No JVD.   Cardiovascular:      Rate and Rhythm: Normal rate and regular rhythm.      Heart sounds: Normal heart sounds. No murmur heard.   No friction rub.   Pulmonary:      Effort: No respiratory distress.      Breath sounds: Normal breath sounds. No wheezing or rales.   Abdominal:      General: There is no distension.      Tenderness: There is no abdominal tenderness.   Musculoskeletal:         General: No tenderness.   Lymphadenopathy:      Cervical: No cervical adenopathy.   Skin:     Findings: No rash.   Neurological:      General: No focal deficit present.      Mental Status: She is alert and oriented to person, place, and time.      Cranial Nerves: No cranial nerve deficit.      Deep Tendon Reflexes: Reflexes normal.   Psychiatric:         Mood and Affect: Mood normal.         Behavior: Behavior normal.         Assessment/Plan   Diagnoses and all orders for this visit:    1. Essential hypertension (Primary)  Continue Edarbi 40 mg po qd, bystolis 10 mg po qd, and HCTZ 12.5 mg po qd  2. Hyperlipidemia LDL goal <100  Continue Repatha injection q 2 weeks  3. Coronary artery disease involving native coronary artery of native heart without angina pectoris  Continue ASA 81 mg po qd and Repatha injection q 2 weeks  4. Acquired hypothyroidism  Continue synthroid 100 mcg po qd  5. Gastroesophageal reflux disease with esophagitis without hemorrhage  Continue Protonix 40 mg po qd

## 2021-08-25 ENCOUNTER — TELEPHONE (OUTPATIENT)
Dept: PHYSICAL THERAPY | Facility: CLINIC | Age: 81
End: 2021-08-25

## 2021-08-30 RX ORDER — EVOLOCUMAB 140 MG/ML
INJECTION, SOLUTION SUBCUTANEOUS
Qty: 6 ML | Refills: 3 | Status: SHIPPED | OUTPATIENT
Start: 2021-08-30 | End: 2022-09-21

## 2021-08-31 ENCOUNTER — TREATMENT (OUTPATIENT)
Dept: PHYSICAL THERAPY | Facility: CLINIC | Age: 81
End: 2021-08-31

## 2021-08-31 DIAGNOSIS — M75.42 IMPINGEMENT SYNDROME OF LEFT SHOULDER: ICD-10-CM

## 2021-08-31 DIAGNOSIS — M75.02 ADHESIVE CAPSULITIS OF LEFT SHOULDER: Primary | ICD-10-CM

## 2021-08-31 DIAGNOSIS — M75.52 BURSITIS OF LEFT SHOULDER: ICD-10-CM

## 2021-08-31 PROCEDURE — 97110 THERAPEUTIC EXERCISES: CPT | Performed by: PHYSICAL THERAPIST

## 2021-08-31 PROCEDURE — 97140 MANUAL THERAPY 1/> REGIONS: CPT | Performed by: PHYSICAL THERAPIST

## 2021-08-31 NOTE — PROGRESS NOTES
Physical Therapy Daily Progress Note    Patient Information  Nicolle Chavez  1940      Visit # : 14    Nicolle Chavez reports 5/10 pain today at rest.  Patient reports that her  has been having some health issues and she is having to help him with a lot of things. She states that this extra work has caused a little more pain in the shoulder than she has been having. Patient reports that she has tried to keep up with her HEP.         Objective Pt presents to PT today with no distress noted.        L shoulder PROM   Flexion: 138  Abduction: 95      See Exercise, Manual, and Modality Logs for complete treatment.     Assessment/Plan  Patient tolerated session well with no increases in shoulder pain. She had mild reports of fatigue during exercises today. Patient continues to have mild increases in shoulder pain at end range of motion during PROM. Patient has reports of reduced stiffness in the shoulder at completion of session. Patient encouraged to continue HEP and to utilize ice frequently.       Progress per Plan of Care  PT will continue to monitor patients signs and symptoms and progress patient as tolerated.     Visit Diagnoses:    ICD-10-CM ICD-9-CM   1. Adhesive capsulitis of left shoulder  M75.02 726.0   2. Bursitis of left shoulder  M75.52 726.10   3. Impingement syndrome of left shoulder  M75.42 726.2            Manual Therapy:    15     mins  67202;  Therapeutic Exercise:    26     mins  53080;     Neuromuscular Jhonny:        mins  28470;    Therapeutic Activity:          mins  09804;     Gait Training:           mins  61281;     Ultrasound:          mins  80009;    Electrical Stimulation:         mins  43049 ( );  Dry Needling          mins self-pay    Timed Treatment:   41   mins   Total Treatment:     51   mins          Traecy Roy PT  Physical Therapist

## 2021-09-08 ENCOUNTER — TREATMENT (OUTPATIENT)
Dept: PHYSICAL THERAPY | Facility: CLINIC | Age: 81
End: 2021-09-08

## 2021-09-08 DIAGNOSIS — M75.42 IMPINGEMENT SYNDROME OF LEFT SHOULDER: ICD-10-CM

## 2021-09-08 DIAGNOSIS — M75.52 BURSITIS OF LEFT SHOULDER: ICD-10-CM

## 2021-09-08 DIAGNOSIS — M75.02 ADHESIVE CAPSULITIS OF LEFT SHOULDER: Primary | ICD-10-CM

## 2021-09-08 PROCEDURE — 97110 THERAPEUTIC EXERCISES: CPT | Performed by: PHYSICAL THERAPIST

## 2021-09-08 PROCEDURE — 97140 MANUAL THERAPY 1/> REGIONS: CPT | Performed by: PHYSICAL THERAPIST

## 2021-09-08 NOTE — PROGRESS NOTES
Physical Therapy Daily Progress Note    Patient Information  Nicolle Chavez  1940      Visit # : 15    Nicolle Chavez reports 0/10 pain today at rest.  Patient reports that her shoulder really only bothers her when she uses it too much. She states that she has been using her shoulder for as much as she can. She states that she is still having to help her  but states her shoulder is not bothering her like it was when she was here last.         Objective Pt presents to PT today with no distress noted.     L shoulder PROM   Flexion: 142  Abduction: 104       See Exercise, Manual, and Modality Logs for complete treatment.     Assessment/Plan  Patient tolerated session well with no pain in L shoulder with exercises. Patient tolerated manual therapy well and demonstrated improved L shoulder PROM today. Patient encouraged to continue avoiding activities that irritate L shoulder when possible and complete HEP as prescribed. Patient with mild tenderness to palpation in L middle deltoid today.       Progress per Plan of Care  PT will continue to monitor patients signs and symptoms and progress patient as tolerated.    Visit Diagnoses:    ICD-10-CM ICD-9-CM   1. Adhesive capsulitis of left shoulder  M75.02 726.0   2. Bursitis of left shoulder  M75.52 726.10   3. Impingement syndrome of left shoulder  M75.42 726.2            Manual Therapy:    14     mins  95571;  Therapeutic Exercise:    27     mins  33958;     Neuromuscular Jhonny:        mins  17009;    Therapeutic Activity:          mins  59388;     Gait Training:           mins  30762;     Ultrasound:          mins  32579;    Electrical Stimulation:         mins  29916 ( );  Dry Needling          mins self-pay    Timed Treatment:   41   mins   Total Treatment:     51   mins          Tracey Roy PT  Physical Therapist

## 2021-09-21 ENCOUNTER — TREATMENT (OUTPATIENT)
Dept: PHYSICAL THERAPY | Facility: CLINIC | Age: 81
End: 2021-09-21

## 2021-09-21 DIAGNOSIS — M75.42 IMPINGEMENT SYNDROME OF LEFT SHOULDER: ICD-10-CM

## 2021-09-21 DIAGNOSIS — M75.02 ADHESIVE CAPSULITIS OF LEFT SHOULDER: Primary | ICD-10-CM

## 2021-09-21 DIAGNOSIS — M75.52 BURSITIS OF LEFT SHOULDER: ICD-10-CM

## 2021-09-21 PROCEDURE — 97140 MANUAL THERAPY 1/> REGIONS: CPT | Performed by: PHYSICAL THERAPIST

## 2021-09-21 PROCEDURE — 97110 THERAPEUTIC EXERCISES: CPT | Performed by: PHYSICAL THERAPIST

## 2021-09-21 NOTE — PROGRESS NOTES
Physical Therapy Daily Progress Note    Patient Information  Nicolle Chavez  1940      Visit # : 16    Nicolle Chavez reports 4/10 pain today at rest.  Patient reports that her shoulder continues to be irritated at times and she is not sure why. She states that she is helping her  less now and has been trying to take it easy. Patient reports that her L arms seems to be getting tired more easily at home.        Objective Pt presents to PT today with no distress noted.     L shoulder PROM   Flexion: 143  Abduction: 100      See Exercise, Manual, and Modality Logs for complete treatment.     Assessment/Plan  Patient tolerated session well with no increases in shoulder pain with exercises. Patient had reports of fatigue and required rest breaks to tolerate session. Patient tolerated manual therapy well with reports of reduced stiffness in L shoulder at completion. Patient encouraged to continue to limit strenuous activity and perform HEP as prescribed.      Progress per Plan of Care  PT will continue to monitor patients signs and symptoms and progress patient as tolerated.    Visit Diagnoses:    ICD-10-CM ICD-9-CM   1. Adhesive capsulitis of left shoulder  M75.02 726.0   2. Bursitis of left shoulder  M75.52 726.10   3. Impingement syndrome of left shoulder  M75.42 726.2            Manual Therapy:    13     mins  64834;  Therapeutic Exercise:    26     mins  26018;     Neuromuscular Jhonny:        mins  52502;    Therapeutic Activity:          mins  75532;     Gait Training:           mins  27560;     Ultrasound:          mins  94356;    Electrical Stimulation:         mins  33729 ( );  Dry Needling          mins self-pay    Timed Treatment:   39   mins   Total Treatment:     54   mins          Tracey Roy PT  Physical Therapist

## 2021-09-23 ENCOUNTER — TELEPHONE (OUTPATIENT)
Dept: PHYSICAL THERAPY | Facility: CLINIC | Age: 81
End: 2021-09-23

## 2021-09-24 ENCOUNTER — OFFICE VISIT (OUTPATIENT)
Dept: ORTHOPEDIC SURGERY | Facility: CLINIC | Age: 81
End: 2021-09-24

## 2021-09-24 VITALS
BODY MASS INDEX: 25.03 KG/M2 | HEART RATE: 66 BPM | WEIGHT: 136.02 LBS | SYSTOLIC BLOOD PRESSURE: 190 MMHG | DIASTOLIC BLOOD PRESSURE: 80 MMHG | HEIGHT: 62 IN

## 2021-09-24 DIAGNOSIS — M75.42 IMPINGEMENT SYNDROME OF LEFT SHOULDER: ICD-10-CM

## 2021-09-24 DIAGNOSIS — M75.22 BICEPS TENDINITIS OF LEFT UPPER EXTREMITY: ICD-10-CM

## 2021-09-24 DIAGNOSIS — M75.02 ADHESIVE CAPSULITIS OF LEFT SHOULDER: Primary | ICD-10-CM

## 2021-09-24 DIAGNOSIS — M75.52 BURSITIS OF LEFT SHOULDER: ICD-10-CM

## 2021-09-24 PROCEDURE — 99214 OFFICE O/P EST MOD 30 MIN: CPT | Performed by: ORTHOPAEDIC SURGERY

## 2021-09-24 NOTE — PROGRESS NOTES
[facility] Orthopaedic Surgery Office Follow Up       Office Follow Up Visit       Patient Name: Nicolle Chavez    Chief Complaint:   Chief Complaint   Patient presents with   • Follow-up     3 month recheck - Impingement syndrome of left shoulder; Adhesive capsulitis of left shoulder       Referring Physician: No ref. provider found    History of Present Illness:   It has been 3  month(s) since Nicolle Chavez's last visit. Nicolle Chavez returns to clinic today for F/U: follow-up of leftBody Part: shoulderReason: pain. The issue has been ongoing for 6 month(s). Nicolle Chavez rates HIS/HER: herpain at 6/10 on the pain scale. Previous/current treatments: physical therapy. Current symptoms:Symptoms: pain and popping. The pain is worse with sitting, sleeping, working, leisure and lying on affected side; ice improves the pain. Overall, he/she: sheis doing the same.  I have reviewed the patient's history of present illness as noted/entered above.    I have reviewed the patient's past medical history, surgical history, social history, family history, medications, and allergies as noted in the electronic medical record and as noted/entered.  I have reviewed the patient's review of systems as noted/enter and updated as noted in the patient's HPI.    PT [facility] physical therapist Tracey Roy    9/24/2021: Reviewed PT note from 9/21/2021 appear to be making some progress.  Continues to have pain and soreness in the left shoulder and stiffness    PT helped but plateau'd     has 3 upcoming procedures b/w now and November, UTI, ER      4/27/2021-left posterior glenohumeral joint injection completed by me in the office    LEFT SHOULDER  6/29/2021  Overall improvements noted but worse today after yard work 2 days ago, 7 acres  PT helping  's health in decline     Doing great with Tracey Roy at [facility]  PT        PRIOR:  Left shoulder pain and stiffness  Worsening pain since January, possible fall history around [employer] [place]     Retired: education, college prof, principal, [employer]     Enjoys: [place]  's health declining unfortunately; she a lot of land and a large home -remains very active      Subjective   Subjective      Review of Systems     Past Medical History:   Past Medical History:   Diagnosis Date   • Abdominal pain     Last Impression: 26 May 2015  Improving. The patient states she had a history of lower   abdominal pain after taking antibiotics for UTI, but this has improved.  Sanjiv Mak (Gastroenterology)   • Abnormal weight gain    • Anxiety 12/12/2016   • Cataract    • Cataract 2014   • Cataracts, bilateral    • Coronary artery disease 12/12/2016    Field ST-elevation myocardial infarction, 11/25/2015, with associated chest pain, jaw pain, diaphoresis, and dyspnea; inferior-anterior ST elevation by outside telemetry strips.  Emergent cardiac catheterization, Dr. Isael Rosenbaum:  Status post drug-eluting stent to the proximal right coronary artery using a 2.25 x 23 mm Xience Alpine stent.  Ejection fraction 55% to 60%.    • Dysphagia 8/15/2016    Last Impression: 26 May 2015  History of intermittent dysphagia symptoms. Differentials     include esophageal dysmotility, Schatzki's ring.  Sanjiv Mak (Gastroenterology)     • Gout     2012   • Gout 2012   • H/O bone density study    • Headache    • Headache    • High cholesterol     1995   • Hyperparathyroidism (CMS/HCC)     2009   • Hypertension    • Hypothyroidism    • Myalgia and myositis 8/15/2016   • Myocardial infarction (CMS/[facility])    • Palpitations 9/25/2017   • Parathyroid disorder (CMS/HCC) 12/12/2016    with a history of a left parathyroid parathyroidectomy.    • Peptic ulcer disease     and bleeding ulcer approximately 12 years ago; incomplete database.    • Sleep apnea        Past Surgical  History:   Past Surgical History:   Procedure Laterality Date   • APPENDECTOMY     • BREAST BIOPSY Right 1995    Ultrasound guided needle biopsy   • COLONOSCOPY     • HYSTERECTOMY Bilateral 1978   • OOPHORECTOMY     • OTHER SURGICAL HISTORY      Ulcer repair   • PARATHYROIDECTOMY Left        Family History:   Family History   Problem Relation Age of Onset   • Anemia Mother    • Arthritis Mother    • Heart disease Mother    • Hypertension Mother    • Stroke Mother    • Cancer Father         stomach   • Breast cancer Neg Hx    • Ovarian cancer Neg Hx    • Colon cancer Neg Hx    • Endometrial cancer Neg Hx        Social History:   Social History     Socioeconomic History   • Marital status:      Spouse name: Not on file   • Number of children: Not on file   • Years of education: Not on file   • Highest education level: Not on file   Tobacco Use   • Smoking status: Never Smoker   • Smokeless tobacco: Never Used   Substance and Sexual Activity   • Alcohol use: No   • Drug use: No   • Sexual activity: Defer       Medications:   Current Outpatient Medications:   •  aspirin 81 MG tablet, Take 1 tablet by mouth daily., Disp: , Rfl:   •  Azilsartan Medoxomil (Edarbi) 40 MG tablet, Take 40 mg by mouth Daily., Disp: 90 tablet, Rfl: 3  •  Bystolic 10 MG tablet, TAKE 1 TABLET BY MOUTH EVERY DAY, Disp: 30 tablet, Rfl: 5  •  cinacalcet (SENSIPAR) 60 MG tablet, Take 1 tablet by mouth daily., Disp: , Rfl:   •  hydroCHLOROthiazide (MICROZIDE) 12.5 MG capsule, Take 1 capsule by mouth Daily., Disp: 90 capsule, Rfl: 3  •  levothyroxine (Synthroid) 100 MCG tablet, Take 1 tablet by mouth Daily., Disp: 90 tablet, Rfl: 1  •  nitroglycerin (NITROSTAT) 0.4 MG SL tablet, Place 1 tablet under the tongue Every 5 (Five) Minutes As Needed for Chest Pain. Take no more than 3 doses in 15 minutes., Disp: 25 tablet, Rfl: 5  •  pantoprazole (PROTONIX) 40 MG EC tablet, Take 40 mg by mouth Daily., Disp: , Rfl:   •  PREMARIN 0.625 MG/GM vaginal  "cream, 1 (One) Time Per Week., Disp: , Rfl:   •  Repatha SureClick solution auto-injector SureClick injection, ADMINISTER 1 ML(140 MG) UNDER THE SKIN EVERY 2 WEEKS, Disp: 6 mL, Rfl: 3  •  Unable to find, 1 each 1 (One) Time. Med Name: Eye injections for Macular Degeneration.     Eylea every 6-8 weeks., Disp: , Rfl:     Allergies:   Allergies   Allergen Reactions   • Pravachol [Pravastatin Sodium] Myalgia   • Statins Myalgia   • Zocor [Simvastatin] Myalgia   • Aspirin Rash   • Bactrim [Sulfamethoxazole-Trimethoprim] Rash   • Crestor [Rosuvastatin Calcium] Myalgia   • Lipitor [Atorvastatin] Myalgia       The following portions of the patient's history were reviewed and updated as appropriate: allergies, current medications, past family history, past medical history, past social history, past surgical history and problem list.        Objective    Objective      Vital Signs:   Vitals:    09/24/21 1006   BP: (!) 190/80   Pulse: 66   Weight: 61.7 kg (136 lb 0.4 oz)   Height: 157.5 cm (62.01\")       Ortho Exam:  LEFT SHOULDER  Good rotator cuff strength  IR improved  GH joint pain  Anterior biceps pain  +impingement testing with Neer/Aldridge  Painful arc of motion    Results Review:  Imaging Results (Last 24 Hours)     ** No results found for the last 24 hours. **          Procedures          Assessment / Plan      Assessment/Plan:   Problem List Items Addressed This Visit        Musculoskeletal and Injuries    Adhesive capsulitis of left shoulder - Primary    Relevant Orders    MRI Shoulder Left Without Contrast    Bursitis of left shoulder    Relevant Orders    MRI Shoulder Left Without Contrast    Impingement syndrome of left shoulder    Relevant Orders    MRI Shoulder Left Without Contrast      Other Visit Diagnoses     Biceps tendinitis of left upper extremity        Relevant Orders    MRI Shoulder Left Without Contrast          LEFT SHOULDER  MRI of the shoulder is recommended.  Indication: suspected rotator cuff " tear  The MRI is critical to evaluate for rotator cuff tearing and will help with possible surgical planning.    Patient desires a conservative course and I agree.  At this point an MRI will help us to rule out other causes of her shoulder pain      Follow Up: AFTER LEFT SHOULDER MRI      Gildardo Harris MD, FAAOS  Orthopedic Surgeon  Fellowship Trained Shoulder and Elbow Surgeon  [facility]  Orthopedics and Sports Medicine  [address] [place] [address], [address]  [place] [address]    09/24/21  10:26 EDT    Please note that portions of this note may have been completed with a voice recognition program. Efforts were made to edit the dictations, but occasionally words are mistranscribed.

## 2021-10-08 ENCOUNTER — HOSPITAL ENCOUNTER (OUTPATIENT)
Dept: MRI IMAGING | Facility: HOSPITAL | Age: 81
Discharge: HOME | End: 2021-10-08
Admitting: ORTHOPAEDIC SURGERY

## 2021-10-08 DIAGNOSIS — M75.22 BICEPS TENDINITIS OF LEFT UPPER EXTREMITY: ICD-10-CM

## 2021-10-08 DIAGNOSIS — M75.02 ADHESIVE CAPSULITIS OF LEFT SHOULDER: ICD-10-CM

## 2021-10-08 DIAGNOSIS — M75.42 IMPINGEMENT SYNDROME OF LEFT SHOULDER: ICD-10-CM

## 2021-10-08 DIAGNOSIS — M75.52 BURSITIS OF LEFT SHOULDER: ICD-10-CM

## 2021-10-08 PROCEDURE — 73221 MRI JOINT UPR EXTREM W/O DYE: CPT

## 2021-10-14 ENCOUNTER — OFFICE VISIT (OUTPATIENT)
Dept: ORTHOPEDIC SURGERY | Facility: CLINIC | Age: 81
End: 2021-10-14

## 2021-10-14 VITALS
SYSTOLIC BLOOD PRESSURE: 180 MMHG | HEIGHT: 62 IN | WEIGHT: 136 LBS | DIASTOLIC BLOOD PRESSURE: 90 MMHG | BODY MASS INDEX: 25.03 KG/M2

## 2021-10-14 DIAGNOSIS — S43.003A SUBLUXATION OF TENDON OF LONG HEAD OF BICEPS: ICD-10-CM

## 2021-10-14 DIAGNOSIS — M75.02 ADHESIVE CAPSULITIS OF LEFT SHOULDER: ICD-10-CM

## 2021-10-14 DIAGNOSIS — M75.42 IMPINGEMENT SYNDROME OF LEFT SHOULDER: ICD-10-CM

## 2021-10-14 DIAGNOSIS — M75.122 NONTRAUMATIC COMPLETE TEAR OF LEFT ROTATOR CUFF: Primary | ICD-10-CM

## 2021-10-14 DIAGNOSIS — M75.22 BICEPS TENDINITIS OF LEFT UPPER EXTREMITY: ICD-10-CM

## 2021-10-14 DIAGNOSIS — M75.52 BURSITIS OF LEFT SHOULDER: ICD-10-CM

## 2021-10-14 PROCEDURE — 99213 OFFICE O/P EST LOW 20 MIN: CPT | Performed by: ORTHOPAEDIC SURGERY

## 2021-10-14 RX ORDER — NEBIVOLOL 10 MG/1
TABLET ORAL
COMMUNITY
Start: 2021-10-04 | End: 2021-11-02

## 2021-10-14 NOTE — PROGRESS NOTES
[facility] Follow Up       Office Follow Up Visit       Patient Name: Nicolle Chavez    Chief Complaint:   Chief Complaint   Patient presents with   • Follow-up     Left shoulder MRI follow up/ MRI done on 10-8-21       Referring Physician: No ref. provider found    History of Present Illness:   It has been 3  week(s) since Nicolle Chavez's last visit. Nicolle Chavez returns to clinic today for F/U: follow-up of leftBody Part: shoulderReason: pain. The issue has been ongoing for 10 month(s). Nicolle Chavez rates HIS/HER: graciela at 8/10 on the pain scale. Previous/current treatments: physical therapy. Current symptoms:Symptoms: stiffness. The pain is worse with sleeping, working and lying on affected side; ice improves the pain. Overall, he/she: sheis doing the same. I have reviewed the patient's history of present illness as noted/entered above.    I have reviewed the patient's past medical history, surgical history, social history, family history, medications, and allergies as noted in the electronic medical record and as noted/entered.  I have reviewed the patient's review of systems as noted/enter and updated as noted in the patient's HPI.      PT [facility] physical therapist Tracey Roy         10/14/2021:  Left shoulder pain and stiffness, MRI completed    9/24/2021: Reviewed PT note from 9/21/2021 appear to be making some progress.  Continues to have pain and soreness in the left shoulder and stiffness     PT helped but plateau'd      has 3 upcoming procedures b/w now and November, UTI, ER        4/27/2021-left posterior glenohumeral joint injection completed by me in the office     LEFT SHOULDER  6/29/2021  Overall improvements noted but worse today after yard work 2 days ago, 7 acres  PT helping  's health in decline     Doing great with Tracey Roy at [facility]  PT        PRIOR:  Left shoulder pain and stiffness  Worsening pain since January, possible fall history around [employer], [employer] [place]     Retired: education, college prof, principal, [employer]     [place]y     Enjoys: [place]  's health declining unfortunately; she a lot of land and a large home -remains very active      Subjective   Subjective      Review of Systems   Constitutional: Negative.  Negative for chills, fatigue and fever.   HENT: Negative.  Negative for congestion and dental problem.    Eyes: Negative.  Negative for blurred vision.   Respiratory: Negative.  Negative for shortness of breath.    Cardiovascular: Negative.  Negative for leg swelling.   Gastrointestinal: Negative.  Negative for abdominal pain.   Endocrine: Negative.  Negative for polyuria.   Genitourinary: Negative.  Negative for difficulty urinating.   Musculoskeletal: Positive for arthralgias.   Skin: Negative.    Allergic/Immunologic: Negative.    Neurological: Negative.    Hematological: Negative.  Negative for adenopathy.   Psychiatric/Behavioral: Negative.  Negative for behavioral problems.        Past Medical History:   Past Medical History:   Diagnosis Date   • Abdominal pain     Last Impression: 26 May 2015  Improving. The patient states she had a history of lower   abdominal pain after taking antibiotics for UTI, but this has improved.  Sanjiv Mak (Gastroenterology)   • Abnormal weight gain    • Anxiety 12/12/2016   • Cataract    • Cataract 2014   • Cataracts, bilateral    • Coronary artery disease 12/12/2016    Field ST-elevation myocardial infarction, 11/25/2015, with associated chest pain, jaw pain, diaphoresis, and dyspnea; inferior-anterior ST elevation by outside telemetry strips.  Emergent cardiac catheterization, Dr. Isael Rosenbaum:  Status post drug-eluting stent to the proximal right coronary artery using a 2.25 x 23 mm Xience Alpine stent.  Ejection fraction 55% to 60%.    • Dysphagia 8/15/2016     Last Impression: 26 May 2015  History of intermittent dysphagia symptoms. Differentials     include esophageal dysmotility, Schatzki's ring.  Sanjiv Mak (Gastroenterology)     • Gout     2012   • Gout 2012   • H/O bone density study    • Headache    • Headache    • High cholesterol     1995   • Hyperparathyroidism (HCC)     2009   • Hypertension    • Hypothyroidism    • Myalgia and myositis 8/15/2016   • Myocardial infarction (HCC)    • Palpitations 9/25/2017   • Parathyroid disorder (HCC) 12/12/2016    with a history of a left parathyroid parathyroidectomy.    • Peptic ulcer disease     and bleeding ulcer approximately 12 years ago; incomplete database.    • Sleep apnea        Past Surgical History:   Past Surgical History:   Procedure Laterality Date   • APPENDECTOMY     • BREAST BIOPSY Right 1995    Ultrasound guided needle biopsy   • COLONOSCOPY     • HYSTERECTOMY Bilateral 1978   • OOPHORECTOMY     • OTHER SURGICAL HISTORY      Ulcer repair   • PARATHYROIDECTOMY Left        Family History:   Family History   Problem Relation Age of Onset   • Anemia Mother    • Arthritis Mother    • Heart disease Mother    • Hypertension Mother    • Stroke Mother    • Cancer Father         stomach   • Breast cancer Neg Hx    • Ovarian cancer Neg Hx    • Colon cancer Neg Hx    • Endometrial cancer Neg Hx        Social History:   Social History     Socioeconomic History   • Marital status:    Tobacco Use   • Smoking status: Never Smoker   • Smokeless tobacco: Never Used   Substance and Sexual Activity   • Alcohol use: No   • Drug use: No   • Sexual activity: Defer       Medications:   Current Outpatient Medications:   •  aspirin 81 MG tablet, Take 1 tablet by mouth daily., Disp: , Rfl:   •  Azilsartan Medoxomil (Edarbi) 40 MG tablet, Take 40 mg by mouth Daily., Disp: 90 tablet, Rfl: 3  •  cinacalcet (SENSIPAR) 60 MG tablet, Take 1 tablet by mouth daily., Disp: , Rfl:   •  levothyroxine (Synthroid) 100 MCG tablet,  "Take 1 tablet by mouth Daily., Disp: 90 tablet, Rfl: 1  •  nebivolol (BYSTOLIC) 10 MG tablet, TAKE 1 TABLET BY MOUTH EVERY DAY, Disp: , Rfl:   •  nitroglycerin (NITROSTAT) 0.4 MG SL tablet, Place 1 tablet under the tongue Every 5 (Five) Minutes As Needed for Chest Pain. Take no more than 3 doses in 15 minutes., Disp: 25 tablet, Rfl: 5  •  pantoprazole (PROTONIX) 40 MG EC tablet, Take 40 mg by mouth Daily., Disp: , Rfl:   •  PREMARIN 0.625 MG/GM vaginal cream, 1 (One) Time Per Week., Disp: , Rfl:   •  Repatha SureClick solution auto-injector SureClick injection, ADMINISTER 1 ML(140 MG) UNDER THE SKIN EVERY 2 WEEKS, Disp: 6 mL, Rfl: 3  •  Unable to find, 1 each 1 (One) Time. Med Name: Eye injections for Macular Degeneration.     Eylea every 6-8 weeks., Disp: , Rfl:     Allergies:   Allergies   Allergen Reactions   • Pravachol [Pravastatin Sodium] Myalgia   • Statins Myalgia   • Zocor [Simvastatin] Myalgia   • Aspirin Rash   • Bactrim [Sulfamethoxazole-Trimethoprim] Rash   • Crestor [Rosuvastatin Calcium] Myalgia   • Lipitor [Atorvastatin] Myalgia       The following portions of the patient's history were reviewed and updated as appropriate: allergies, current medications, past family history, past medical history, past social history, past surgical history and problem list.        Objective    Objective      Vital Signs:   Vitals:    10/14/21 0824   BP: 180/90   Weight: 61.7 kg (136 lb)   Height: 157.5 cm (62.01\")       Ortho Exam:  LEFT shoulder  ROM has improved  Pain with Sheryl's, mild pain with SSc testing  Anterior biceps pain    Results Review:  Imaging Results (Last 24 Hours)     ** No results found for the last 24 hours. **          MRI Shoulder Left Without Contrast    Result Date: 10/11/2021   1. Full-thickness tear of the more superior fibers of the subscapularis tendon. 2. Supraspinatus tendinosis. 3. Acromioclavicular joint space arthrosis.  This report was finalized on 10/11/2021 4:26 PM by Paul" RANJEET Miranda..    To my official review interpretation of the MRI above it actually appears to be tearing of the anterior portion of the supraspinatus into the rotator interval perhaps involving the upper border the subscapularis tendon is well I do not visualize the long head of the biceps tendon-perhaps tearing or medial subluxation long head of the biceps    Procedures          Assessment / Plan      Assessment/Plan:   Problem List Items Addressed This Visit        Musculoskeletal and Injuries    Adhesive capsulitis of left shoulder    Bursitis of left shoulder    Impingement syndrome of left shoulder    Nontraumatic complete tear of left rotator cuff - Primary    Biceps tendinitis of left upper extremity    Subluxation of tendon of long head of biceps        LEFT SHOULDER MRI -- reviewed, counseled  Counseled on activities moving forward, care of her   Continue with HEP    Follow Up: 3 months      Gildardo Harris MD, FAAOS  Orthopedic Surgeon  Fellowship Trained Shoulder and Elbow Surgeon  [facility]  [address] [place] [address], [address]  [place] [address]    10/14/21  08:44 EDT    Please note that portions of this note may have been completed with a voice recognition program. Efforts were made to edit the dictations, but occasionally words are mistranscribed.

## 2021-10-26 ENCOUNTER — OFFICE VISIT (OUTPATIENT)
Dept: CARDIOLOGY | Facility: CLINIC | Age: 81
End: 2021-10-26

## 2021-10-26 VITALS
HEIGHT: 62 IN | BODY MASS INDEX: 24.56 KG/M2 | DIASTOLIC BLOOD PRESSURE: 92 MMHG | SYSTOLIC BLOOD PRESSURE: 190 MMHG | HEART RATE: 61 BPM | OXYGEN SATURATION: 98 % | WEIGHT: 133.5 LBS

## 2021-10-26 DIAGNOSIS — I10 ESSENTIAL HYPERTENSION: ICD-10-CM

## 2021-10-26 DIAGNOSIS — I25.10 CORONARY ARTERY DISEASE INVOLVING NATIVE CORONARY ARTERY OF NATIVE HEART WITHOUT ANGINA PECTORIS: Primary | ICD-10-CM

## 2021-10-26 DIAGNOSIS — E78.5 HYPERLIPIDEMIA LDL GOAL <100: ICD-10-CM

## 2021-10-26 PROCEDURE — 99214 OFFICE O/P EST MOD 30 MIN: CPT | Performed by: INTERNAL MEDICINE

## 2021-10-26 RX ORDER — CLONIDINE HYDROCHLORIDE 0.1 MG/1
0.1 TABLET ORAL EVERY 8 HOURS PRN
Qty: 10 TABLET | Refills: 5 | Status: SHIPPED | OUTPATIENT
Start: 2021-10-26

## 2021-10-26 RX ORDER — AMLODIPINE BESYLATE 5 MG/1
5 TABLET ORAL DAILY
Qty: 90 TABLET | Refills: 3 | Status: SHIPPED | OUTPATIENT
Start: 2021-10-26 | End: 2022-11-14

## 2021-10-26 NOTE — PROGRESS NOTES
[facility]  Office visit  Nicolle Chavez  1940  [phone]    VISIT DATE:  10/26/2021      PCP: Christelle Gamez DO  [address] [facility] [address]    CC:  Chief Complaint   Patient presents with   • Coronary Artery Disease   • Hypertension       PROBLEM LIST:  1. Coronary artery disease:  a. Field ST-elevation myocardial infarction, 11/25/2015, with associated chest pain, jaw pain, diaphoresis, and dyspnea; inferior-anterior ST elevation by outside telemetry strips.   b. Emergent cardiac catheterization, Dr. Isael Rosenbaum: Status post drug-eluting stent to the proximal right coronary artery using a 2.25 x 23 mm Xience Alpine stent.   c. Ejection fraction 55% to 60%.   2. Uncontrolled Essential HTN.  3. Dyspnea.   4. Mixed Dyslipidemia.   5. Hypothyroidism, on chronic supplementation.   6. 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   7. Lower extremity edema.   8. History of peptic ulcer disease and bleeding ulcer approximately 12 years ago; incomplete database.   9. Family history of coronary disease.   10. Anxiety.   11. Gout.   12. Surgical history:  a. Hysterectomy.   b. Left parathyroidectomy.  c. Ulcer repair.       Previous cardiac studies and procedures:  October 2019 2-week ambulatory ECG monitor:  · Rare PACs which are intermittently symptomatic. Episodes of nonsustained atrial tachycardia.    ASSESSMENT:   Diagnosis Plan   1. Coronary artery disease involving native coronary artery of native heart without angina pectoris     2. Essential hypertension     3. Hyperlipidemia LDL goal <100         PLAN:  Coronary artery disease: Stable.  Continue aspirin, beta blockade, statin.    Hyperlipidemia: Significant improvement in overall cholesterol profile after initiation of PCSK9 inhibitor.  Continue current medical therapy.      Hypertension: Goal less than 130/80 mmHg.  Previously with intermittent orthostasis due to  "symptomatic hypotension.  Did not tolerate thiazide diuretics.  Adding amlodipine 5 mg p.o. nightly, adding clonidine 0.1 mg p.o. as needed for blood pressures greater than 180/100 mmHg.  Otherwise continue current medical therapy.  Follow-up in 6 weeks to assess clinical response.    Symptomatic PACs: Currently well controlled.  Continue beta-blockade.  Known episodes of asymptomatic nonsustained atrial tachycardia.    Subjective  Developed myalgias on atorvastatin. Denies palpitations.  Recent worsening blood pressure control with intermittent blood pressures running in the 190 to 95 mmHg range, induces headache.  Intermittent left shoulder discomfort, ongoing orthopedic evaluation.  Plan with medical therapy.  Increased emotional stress due to 's illness.    PHYSICAL EXAMINATION:  Vitals:    10/26/21 0927   BP: (!) 190/92   BP Location: Right arm   Patient Position: Sitting   Pulse: 61   SpO2: 98%   Weight: 60.6 kg (133 lb 8 oz)   Height: 157.5 cm (62\")     General Appearance:    Alert, cooperative, no distress, appears stated age   Head:    Normocephalic, without obvious abnormality, atraumatic   Eyes:    conjunctiva/corneas clear   Nose:   Nares normal, septum midline, mucosa normal, no drainage   Throat:   Lips, teeth and gums normal   Neck:   Supple, symmetrical, trachea midline, no carotid    bruit or JVD   Lungs:     Clear to auscultation bilaterally, respirations unlabored   Chest Wall:    No tenderness or deformity    Heart:    Regular rate and rhythm, S1 and S2 normal, no murmur, rub   or gallop, normal carotid impulse bilaterally without bruit.   Abdomen:     Soft, non-tender   Extremities:   Extremities normal, atraumatic, no cyanosis or edema   Pulses:   2+ and symmetric all extremities   Skin:   Skin color, texture, turgor normal, no rashes or lesions       Diagnostic Data:  Procedures  Lab Results   Component Value Date    CHLPL 181 10/14/2020    TRIG 140 04/19/2021    HDL 53 04/19/2021 "     Lab Results   Component Value Date    GLUCOSE 102 (H) 04/19/2021    BUN 27 (H) 04/19/2021    CREATININE 0.97 04/19/2021     04/19/2021    K 3.5 04/19/2021    CL 98 04/19/2021    CO2 30.1 (H) 04/19/2021     Lab Results   Component Value Date    HGBA1C 5.2 01/21/2016     Lab Results   Component Value Date    WBC 5.30 04/19/2021    HGB 12.4 04/19/2021    HCT 37.8 04/19/2021     04/19/2021       Allergies  Allergies   Allergen Reactions   • Pravachol [Pravastatin Sodium] Myalgia   • Statins Myalgia   • Zocor [Simvastatin] Myalgia   • Aspirin Rash   • Bactrim [Sulfamethoxazole-Trimethoprim] Rash   • Crestor [Rosuvastatin Calcium] Myalgia   • Lipitor [Atorvastatin] Myalgia       Current Medications    Current Outpatient Medications:   •  aspirin 81 MG tablet, Take 1 tablet by mouth daily., Disp: , Rfl:   •  Azilsartan Medoxomil (Edarbi) 40 MG tablet, Take 40 mg by mouth Daily., Disp: 90 tablet, Rfl: 3  •  cinacalcet (SENSIPAR) 60 MG tablet, Take 1 tablet by mouth daily., Disp: , Rfl:   •  levothyroxine (Synthroid) 100 MCG tablet, Take 1 tablet by mouth Daily., Disp: 90 tablet, Rfl: 1  •  nebivolol (BYSTOLIC) 10 MG tablet, TAKE 1 TABLET BY MOUTH EVERY DAY, Disp: , Rfl:   •  nitroglycerin (NITROSTAT) 0.4 MG SL tablet, Place 1 tablet under the tongue Every 5 (Five) Minutes As Needed for Chest Pain. Take no more than 3 doses in 15 minutes., Disp: 25 tablet, Rfl: 5  •  pantoprazole (PROTONIX) 40 MG EC tablet, Take 40 mg by mouth As Needed., Disp: , Rfl:   •  PREMARIN 0.625 MG/GM vaginal cream, 1 (One) Time Per Week., Disp: , Rfl:   •  Repatha SureClick solution auto-injector SureClick injection, ADMINISTER 1 ML(140 MG) UNDER THE SKIN EVERY 2 WEEKS, Disp: 6 mL, Rfl: 3  •  Unable to find, 1 each 1 (One) Time. Med Name: Eye injections for Macular Degeneration.     Eylea every 6-8 weeks., Disp: , Rfl:           ROS  Review of Systems   Cardiovascular: Positive for chest pain, dyspnea on exertion, leg swelling and  palpitations.   Respiratory: Positive for shortness of breath and snoring. Negative for cough and sputum production.        SOCIAL HX  Social History     Socioeconomic History   • Marital status:    Tobacco Use   • Smoking status: Never Smoker   • Smokeless tobacco: Never Used   Vaping Use   • Vaping Use: Never used   Substance and Sexual Activity   • Alcohol use: No   • Drug use: No   • Sexual activity: Defer       FAMILY HX  Family History   Problem Relation Age of Onset   • Anemia Mother    • Arthritis Mother    • Heart disease Mother    • Hypertension Mother    • Stroke Mother    • Cancer Father         stomach   • Breast cancer Neg Hx    • Ovarian cancer Neg Hx    • Colon cancer Neg Hx    • Endometrial cancer Neg Hx              Rick Qiu III, MD, FACC

## 2021-11-02 RX ORDER — NEBIVOLOL 10 MG/1
TABLET ORAL
Qty: 30 TABLET | Refills: 5 | Status: SHIPPED | OUTPATIENT
Start: 2021-11-02 | End: 2022-05-02

## 2021-11-22 ENCOUNTER — TELEPHONE (OUTPATIENT)
Dept: CARDIOLOGY | Facility: CLINIC | Age: 81
End: 2021-11-22

## 2021-11-22 NOTE — TELEPHONE ENCOUNTER
[facility] pharmacy sent a PA request for Repatha.PA for Repatha initiated today. Awaiting determination from insurance company.

## 2021-12-15 ENCOUNTER — LAB (OUTPATIENT)
Dept: LAB | Facility: HOSPITAL | Age: 81
End: 2021-12-15

## 2021-12-15 ENCOUNTER — OFFICE VISIT (OUTPATIENT)
Dept: INTERNAL MEDICINE | Facility: CLINIC | Age: 81
End: 2021-12-15

## 2021-12-15 VITALS
OXYGEN SATURATION: 97 % | HEART RATE: 98 BPM | SYSTOLIC BLOOD PRESSURE: 116 MMHG | BODY MASS INDEX: 24.66 KG/M2 | WEIGHT: 134 LBS | DIASTOLIC BLOOD PRESSURE: 72 MMHG | HEIGHT: 62 IN

## 2021-12-15 DIAGNOSIS — I10 ESSENTIAL HYPERTENSION: ICD-10-CM

## 2021-12-15 DIAGNOSIS — E03.9 ACQUIRED HYPOTHYROIDISM: ICD-10-CM

## 2021-12-15 DIAGNOSIS — E78.5 HYPERLIPIDEMIA LDL GOAL <100: ICD-10-CM

## 2021-12-15 DIAGNOSIS — I25.10 CORONARY ARTERY DISEASE INVOLVING NATIVE CORONARY ARTERY OF NATIVE HEART WITHOUT ANGINA PECTORIS: ICD-10-CM

## 2021-12-15 DIAGNOSIS — K21.00 GASTROESOPHAGEAL REFLUX DISEASE WITH ESOPHAGITIS WITHOUT HEMORRHAGE: ICD-10-CM

## 2021-12-15 DIAGNOSIS — E78.5 HYPERLIPIDEMIA LDL GOAL <100: Primary | ICD-10-CM

## 2021-12-15 PROBLEM — M81.0 OSTEOPOROSIS: Status: ACTIVE | Noted: 2021-11-22

## 2021-12-15 LAB
CHOLEST SERPL-MCNC: 165 MG/DL (ref 0–200)
HDLC SERPL-MCNC: 59 MG/DL (ref 40–60)
LDLC SERPL CALC-MCNC: 75 MG/DL (ref 0–100)
LDLC/HDLC SERPL: 1.17 {RATIO}
TRIGL SERPL-MCNC: 184 MG/DL (ref 0–150)
TSH SERPL DL<=0.05 MIU/L-ACNC: 1.44 UIU/ML (ref 0.27–4.2)
VLDLC SERPL-MCNC: 31 MG/DL (ref 5–40)

## 2021-12-15 PROCEDURE — 80061 LIPID PANEL: CPT

## 2021-12-15 PROCEDURE — 84443 ASSAY THYROID STIM HORMONE: CPT

## 2021-12-15 PROCEDURE — 99214 OFFICE O/P EST MOD 30 MIN: CPT | Performed by: INTERNAL MEDICINE

## 2021-12-15 NOTE — PROGRESS NOTES
Subjective   Nicolle Chavez is a 81 y.o. female.   Chief Complaint   Patient presents with   • Hypertension   • Hyperlipidemia   • Hypothyroidism   • Coronary Artery Disease   • Heartburn       History of Present Illness   Here for f/u on chronic conditions: HTN, HLP, hypothyroid, GERD, and heartburn. HTN controlled with Clonidine, Bystolic, Norvasc, and Edarbi. No CP or SOA. CAD controlled with ASA and Repatha. Hypothyroid controlled with synthroid. HLP controlled with Repatha. GERD controlled with Protonix.   The following portions of the patient's history were reviewed and updated as appropriate: allergies, current medications, past family history, past medical history, past social history, past surgical history and problem list.  Past Medical History:   Diagnosis Date   • Abdominal pain     Last Impression: 26 May 2015  Improving. The patient states she had a history of lower   abdominal pain after taking antibiotics for UTI, but this has improved.  Sanjiv Mak (Gastroenterology)   • Abnormal weight gain    • Anxiety 12/12/2016   • Cataract    • Cataract 2014   • Cataracts, bilateral    • Coronary artery disease 12/12/2016    Field ST-elevation myocardial infarction, 11/25/2015, with associated chest pain, jaw pain, diaphoresis, and dyspnea; inferior-anterior ST elevation by outside telemetry strips.  Emergent cardiac catheterization, Dr. Isael Rosenbaum:  Status post drug-eluting stent to the proximal right coronary artery using a 2.25 x 23 mm Xience Alpine stent.  Ejection fraction 55% to 60%.    • Dysphagia 8/15/2016    Last Impression: 26 May 2015  History of intermittent dysphagia symptoms. Differentials     include esophageal dysmotility, Schatzki's ring.  Sanjiv Mak (Gastroenterology)     • Gout     2012   • Gout 2012   • H/O bone density study    • Headache    • Headache    • High cholesterol     1995   • Hyperparathyroidism (HCC)     2009   • Hypertension    • Hypothyroidism    •  Myalgia and myositis 8/15/2016   • Myocardial infarction (HCC)    • Palpitations 9/25/2017   • Parathyroid disorder (HCC) 12/12/2016    with a history of a left parathyroid parathyroidectomy.    • Peptic ulcer disease     and bleeding ulcer approximately 12 years ago; incomplete database.    • Sleep apnea      Past Surgical History:   Procedure Laterality Date   • APPENDECTOMY     • BREAST BIOPSY Right 1995    Ultrasound guided needle biopsy   • COLONOSCOPY     • HYSTERECTOMY Bilateral 1978   • OOPHORECTOMY     • OTHER SURGICAL HISTORY      Ulcer repair   • PARATHYROIDECTOMY Left      Family History   Problem Relation Age of Onset   • Anemia Mother    • Arthritis Mother    • Heart disease Mother    • Hypertension Mother    • Stroke Mother    • Cancer Father         stomach   • Breast cancer Neg Hx    • Ovarian cancer Neg Hx    • Colon cancer Neg Hx    • Endometrial cancer Neg Hx      Social History     Socioeconomic History   • Marital status:    Tobacco Use   • Smoking status: Never Smoker   • Smokeless tobacco: Never Used   Vaping Use   • Vaping Use: Never used   Substance and Sexual Activity   • Alcohol use: No   • Drug use: No   • Sexual activity: Defer     Current Outpatient Medications on File Prior to Visit   Medication Sig Dispense Refill   • amLODIPine (NORVASC) 5 MG tablet Take 1 tablet by mouth Daily. 90 tablet 3   • aspirin 81 MG tablet Take 1 tablet by mouth daily.     • Azilsartan Medoxomil (Edarbi) 40 MG tablet Take 40 mg by mouth Daily. 90 tablet 3   • cinacalcet (SENSIPAR) 60 MG tablet Take 1 tablet by mouth daily.     • cloNIDine (Catapres) 0.1 MG tablet Take 1 tablet by mouth Every 8 (Eight) Hours As Needed for High Blood Pressure (BP >180/100 mm Hg). 10 tablet 5   • levothyroxine (Synthroid) 100 MCG tablet Take 1 tablet by mouth Daily. 90 tablet 1   • nebivolol (BYSTOLIC) 10 MG tablet TAKE 1 TABLET BY MOUTH EVERY DAY 30 tablet 5   • nitroglycerin (NITROSTAT) 0.4 MG SL tablet Place 1 tablet  "under the tongue Every 5 (Five) Minutes As Needed for Chest Pain. Take no more than 3 doses in 15 minutes. 25 tablet 5   • pantoprazole (PROTONIX) 40 MG EC tablet Take 40 mg by mouth As Needed.     • PREMARIN 0.625 MG/GM vaginal cream 1 (One) Time Per Week.     • Repatha SureClick solution auto-injector SureClick injection ADMINISTER 1 ML(140 MG) UNDER THE SKIN EVERY 2 WEEKS 6 mL 3   • Unable to find 1 each 1 (One) Time. Med Name: Eye injections for Macular Degeneration.     Eylea every 6-8 weeks.       No current facility-administered medications on file prior to visit.       Review of Systems   Constitutional: Negative for fatigue and fever.   HENT: Negative for sinus pressure and sinus pain.    Respiratory: Negative for cough and shortness of breath.    Cardiovascular: Negative for chest pain and palpitations.   Gastrointestinal: Negative for diarrhea, nausea and vomiting.   Musculoskeletal:        Shoulder pain left (sees ortho)   Neurological: Negative for numbness and headaches.   Psychiatric/Behavioral: Negative for hallucinations. The patient is not nervous/anxious and is not hyperactive.      /72   Pulse 98   Ht 157.5 cm (62\")   Wt 60.8 kg (134 lb)   SpO2 97%   BMI 24.51 kg/m²     Objective   Physical Exam  Vitals reviewed.   Cardiovascular:      Rate and Rhythm: Normal rate and regular rhythm.      Heart sounds: No murmur heard.      Pulmonary:      Effort: No respiratory distress.      Breath sounds: No wheezing or rales.   Abdominal:      General: There is no distension.      Tenderness: There is no abdominal tenderness. There is no guarding.   Musculoskeletal:      Right lower leg: No edema.      Left lower leg: No edema.   Neurological:      General: No focal deficit present.      Mental Status: She is alert and oriented to person, place, and time.   Psychiatric:         Mood and Affect: Mood normal.         Behavior: Behavior normal.         Assessment/Plan   Diagnoses and all orders for " this visit:    1. Hyperlipidemia LDL goal <100 (Primary)  -     Lipid Panel; Future  Continue Repatha once a week  2. Essential hypertension  Continue clonidine 0.1 mg po q8, bystolic 10 mg po qd, Edarbi 40  Mg po qd, and norvasc 5 mg ppo qd  3. Coronary artery disease involving native coronary artery of native heart without angina pectoris  Continue Repatha once a week and ASA 81 mg po qd  4. Acquired hypothyroidism  -     TSH; Future  Continue synthroid 100 mcg po qd  5. Gastroesophageal reflux disease with esophagitis without hemorrhage  Continue Protonix 40 mg po qd

## 2021-12-30 ENCOUNTER — HOSPITAL ENCOUNTER (OUTPATIENT)
Dept: INFUSION THERAPY | Facility: HOSPITAL | Age: 81
Setting detail: INFUSION SERIES
Discharge: HOME | End: 2021-12-30

## 2021-12-30 VITALS
SYSTOLIC BLOOD PRESSURE: 146 MMHG | TEMPERATURE: 98 F | RESPIRATION RATE: 18 BRPM | HEART RATE: 64 BPM | HEIGHT: 62 IN | DIASTOLIC BLOOD PRESSURE: 73 MMHG | OXYGEN SATURATION: 98 % | BODY MASS INDEX: 24.66 KG/M2 | WEIGHT: 134 LBS

## 2021-12-30 DIAGNOSIS — M81.0 OSTEOPOROSIS, UNSPECIFIED OSTEOPOROSIS TYPE, UNSPECIFIED PATHOLOGICAL FRACTURE PRESENCE: Primary | ICD-10-CM

## 2021-12-30 PROCEDURE — 96401 CHEMO ANTI-NEOPL SQ/IM: CPT

## 2021-12-30 PROCEDURE — 96372 THER/PROPH/DIAG INJ SC/IM: CPT

## 2021-12-30 PROCEDURE — 25010000002 DENOSUMAB 60 MG/ML SOLUTION PREFILLED SYRINGE: Performed by: INTERNAL MEDICINE

## 2021-12-30 RX ADMIN — DENOSUMAB 60 MG: 60 INJECTION SUBCUTANEOUS at 10:19

## 2022-01-25 RX ORDER — AZILSARTAN KAMEDOXOMIL 40 MG/1
TABLET ORAL
Qty: 90 TABLET | Refills: 1 | Status: SHIPPED | OUTPATIENT
Start: 2022-01-25 | End: 2022-01-26 | Stop reason: CLARIF

## 2022-01-26 ENCOUNTER — TELEPHONE (OUTPATIENT)
Dept: CARDIOLOGY | Facility: CLINIC | Age: 82
End: 2022-01-26

## 2022-01-26 RX ORDER — IRBESARTAN 150 MG/1
150 TABLET ORAL DAILY
Qty: 30 TABLET | Refills: 1 | Status: SHIPPED | OUTPATIENT
Start: 2022-01-26 | End: 2022-04-29

## 2022-01-26 NOTE — TELEPHONE ENCOUNTER
Humza is off her insurance preferred medication list. Cost >300.00/ 90 day supply. Called # [phone] per her request. Talked to Astrid pierson at her insurance company. A formulary coverage review was completed over the phone. Approved. Case ID # [id number]. Approval letter to be sent to office and to patient.

## 2022-01-26 NOTE — TELEPHONE ENCOUNTER
The cost of her Edarbi 40 mg daily has not changed after the approval. She is requesting it be changed to one of the formulary covered medications. Telmisartan or Irbesartan. Please advise.

## 2022-01-27 ENCOUNTER — OFFICE VISIT (OUTPATIENT)
Dept: ORTHOPEDIC SURGERY | Facility: CLINIC | Age: 82
End: 2022-01-27

## 2022-01-27 VITALS
SYSTOLIC BLOOD PRESSURE: 144 MMHG | BODY MASS INDEX: 25.31 KG/M2 | DIASTOLIC BLOOD PRESSURE: 86 MMHG | WEIGHT: 134.04 LBS | HEIGHT: 61 IN

## 2022-01-27 DIAGNOSIS — M75.22 BICEPS TENDINITIS OF LEFT UPPER EXTREMITY: ICD-10-CM

## 2022-01-27 DIAGNOSIS — M75.02 ADHESIVE CAPSULITIS OF LEFT SHOULDER: ICD-10-CM

## 2022-01-27 DIAGNOSIS — S43.003A SUBLUXATION OF TENDON OF LONG HEAD OF BICEPS: ICD-10-CM

## 2022-01-27 DIAGNOSIS — M75.122 NONTRAUMATIC COMPLETE TEAR OF LEFT ROTATOR CUFF: Primary | ICD-10-CM

## 2022-01-27 DIAGNOSIS — M75.52 BURSITIS OF LEFT SHOULDER: ICD-10-CM

## 2022-01-27 DIAGNOSIS — M75.42 IMPINGEMENT SYNDROME OF LEFT SHOULDER: ICD-10-CM

## 2022-01-27 PROCEDURE — 20610 DRAIN/INJ JOINT/BURSA W/O US: CPT | Performed by: ORTHOPAEDIC SURGERY

## 2022-01-27 PROCEDURE — 99213 OFFICE O/P EST LOW 20 MIN: CPT | Performed by: ORTHOPAEDIC SURGERY

## 2022-01-27 RX ORDER — LIDOCAINE HYDROCHLORIDE 10 MG/ML
5 INJECTION, SOLUTION EPIDURAL; INFILTRATION; INTRACAUDAL; PERINEURAL
Status: COMPLETED | OUTPATIENT
Start: 2022-01-27 | End: 2022-01-27

## 2022-01-27 RX ORDER — TRIAMCINOLONE ACETONIDE 40 MG/ML
40 INJECTION, SUSPENSION INTRA-ARTICULAR; INTRAMUSCULAR
Status: COMPLETED | OUTPATIENT
Start: 2022-01-27 | End: 2022-01-27

## 2022-01-27 RX ADMIN — LIDOCAINE HYDROCHLORIDE 5 ML: 10 INJECTION, SOLUTION EPIDURAL; INFILTRATION; INTRACAUDAL; PERINEURAL at 13:42

## 2022-01-27 RX ADMIN — TRIAMCINOLONE ACETONIDE 40 MG: 40 INJECTION, SUSPENSION INTRA-ARTICULAR; INTRAMUSCULAR at 13:42

## 2022-01-27 NOTE — PROGRESS NOTES
Procedure   Large Joint Arthrocentesis: L subacromial bursa  Date/Time: 1/27/2022 1:42 PM  Consent given by: patient  Site marked: site marked  Timeout: Immediately prior to procedure a time out was called to verify the correct patient, procedure, equipment, support staff and site/side marked as required   Supporting Documentation  Indications: pain   Procedure Details  Location: shoulder - L subacromial bursa  Preparation: Patient was prepped and draped in the usual sterile fashion  Needle size: 22 G  Approach: posterior  Medications administered: 5 mL lidocaine PF 1% 1 %; 40 mg triamcinolone acetonide 40 MG/ML  Patient tolerance: patient tolerated the procedure well with no immediate complications

## 2022-01-27 NOTE — PROGRESS NOTES
[facility] Orthopaedic Surgery Office Follow Up       Office Follow Up Visit       Patient Name: Nicolle Chavez    Chief Complaint:   Chief Complaint   Patient presents with   • Follow-up     3 month follow up; Nontraumatic complete tear of left rotator cuff        Referring Physician: No ref. provider found    History of Present Illness:   It has been 3  month(s) since Nicolle Chavez's last visit. Nicolle Chavez returns to clinic today for F/U: follow-up of leftBody Part: shoulderReason: pain. The issue has been ongoing for 1 year(s). Nicolle Chavez rates HIS/HER: herpain at 6/10 on the pain scale. Previous/current treatments: physical therapy. Current symptoms:Symptoms: pain and stiffness. The pain is worse with sleeping, working, leisure and lying on affected side; resting and ice improves the pain. Overall, he/she: sheis doing the same.  I have reviewed the patient's history of present illness as noted/entered above.    I have reviewed the patient's past medical history, surgical history, social history, family history, medications, and allergies as noted in the electronic medical record and as noted/entered.  I have reviewed the patient's review of systems as noted/enter and updated as noted in the patient's HPI.    PT [facility] physical therapist Tracey Roy     Left shoulder pain, rotator cuff tear     1/27/2022:  Left shoulder pain persists rated pain a 6 of 10 despite physical therapy, activity modifications, rest, ice, medications.       MRI Shoulder Left Without Contrast     Result Date: 10/11/2021   1. Full-thickness tear of the more superior fibers of the subscapularis tendon. 2. Supraspinatus tendinosis. 3. Acromioclavicular joint space arthrosis.  This report was finalized on 10/11/2021 4:26 PM by Paul Miranda M.D..     To my official review interpretation of the MRI above it actually appears to be  tearing of the anterior portion of the supraspinatus into the rotator interval perhaps involving the upper border the subscapularis tendon is well I do not visualize the long head of the biceps tendon-perhaps tearing or medial subluxation long head of the biceps    10/14/2021:  Left shoulder pain and stiffness, MRI completed  Pain persists did try an injection in the past which helped    Rotator cuff tearing adhesive capsulitis, possible biceps tearing     9/24/2021: Reviewed PT note from 9/21/2021 appear to be making some progress.  Continues to have pain and soreness in the left shoulder and stiffness     PT helped but plateau'd      has 3 upcoming procedures b/w now and November, UTI, ER        4/27/2021-left posterior glenohumeral joint injection completed by me in the office     LEFT SHOULDER  6/29/2021  Overall improvements noted but worse today after yard work 2 days ago, 7 acres  PT helping  's health in decline     Doing great with Tracey Roy at [facility] PT        PRIOR:  Left shoulder pain and stiffness  Worsening pain since January, possible fall history around [place]     Retired: education, college prof, principal, Dept of [employer]     Enjoys: [place]  's health declining unfortunately; she a lot of land and a large home -remains very active      Subjective   Subjective      Review of Systems   Constitutional: Negative.  Negative for chills, fatigue and fever.   HENT: Negative.  Negative for congestion and dental problem.    Eyes: Negative.  Negative for blurred vision.   Respiratory: Negative.  Negative for shortness of breath.    Cardiovascular: Negative.  Negative for leg swelling.   Gastrointestinal: Negative.  Negative for abdominal pain.   Endocrine: Negative.  Negative for polyuria.   Genitourinary: Negative.  Negative for difficulty urinating.   Musculoskeletal: Positive for arthralgias.   Skin: Negative.    Allergic/Immunologic:  Negative.    Neurological: Negative.    Hematological: Negative.  Negative for adenopathy.   Psychiatric/Behavioral: Negative.  Negative for behavioral problems.        Past Medical History:   Past Medical History:   Diagnosis Date   • Abdominal pain     Last Impression: 26 May 2015  Improving. The patient states she had a history of lower   abdominal pain after taking antibiotics for UTI, but this has improved.  Sanjiv Mak (Gastroenterology)   • Abnormal weight gain    • Anxiety 12/12/2016   • Arthritis of neck    • Cataract    • Cataract 2014   • Cataracts, bilateral    • Coronary artery disease 12/12/2016    Field ST-elevation myocardial infarction, 11/25/2015, with associated chest pain, jaw pain, diaphoresis, and dyspnea; inferior-anterior ST elevation by outside telemetry strips.  Emergent cardiac catheterization, Dr. Isael Rosenbaum:  Status post drug-eluting stent to the proximal right coronary artery using a 2.25 x 23 mm Xience Alpine stent.  Ejection fraction 55% to 60%.    • CTS (carpal tunnel syndrome)    • Dysphagia 8/15/2016    Last Impression: 26 May 2015  History of intermittent dysphagia symptoms. Differentials     include esophageal dysmotility, Schatzki's ring.  Sanjiv Mak (Gastroenterology)     • Fracture of wrist    • Gout     2012   • Gout 2012   • H/O bone density study    • Headache    • Headache    • High cholesterol     1995   • Hyperparathyroidism (HCC)     2009   • Hypertension    • Hypothyroidism    • Low back strain    • Myalgia and myositis 8/15/2016   • Myocardial infarction (HCC)    • Neck strain    • Palpitations 9/25/2017   • Parathyroid disorder (HCC) 12/12/2016    with a history of a left parathyroid parathyroidectomy.    • Peptic ulcer disease     and bleeding ulcer approximately 12 years ago; incomplete database.    • Rotator cuff syndrome january 2021   • Scoliosis    • Sleep apnea        Past Surgical History:   Past Surgical History:   Procedure Laterality  Date   • APPENDECTOMY     • BREAST BIOPSY Right 1995    Ultrasound guided needle biopsy   • COLONOSCOPY     • HYSTERECTOMY Bilateral 1978   • OOPHORECTOMY     • OTHER SURGICAL HISTORY      Ulcer repair   • PARATHYROIDECTOMY Left    • WRIST SURGERY  2012       Family History:   Family History   Problem Relation Age of Onset   • Anemia Mother    • Arthritis Mother    • Heart disease Mother    • Hypertension Mother    • Stroke Mother    • Broken bones Mother    • Osteoporosis Mother    • Cancer Father         stomach   • Breast cancer Neg Hx    • Ovarian cancer Neg Hx    • Colon cancer Neg Hx    • Endometrial cancer Neg Hx        Social History:   Social History     Socioeconomic History   • Marital status:    Tobacco Use   • Smoking status: Never Smoker   • Smokeless tobacco: Never Used   Vaping Use   • Vaping Use: Never used   Substance and Sexual Activity   • Alcohol use: No   • Drug use: No   • Sexual activity: Not Currently     Partners: Male       Medications:   Current Outpatient Medications:   •  amLODIPine (NORVASC) 5 MG tablet, Take 1 tablet by mouth Daily., Disp: 90 tablet, Rfl: 3  •  aspirin 81 MG tablet, Take 1 tablet by mouth daily., Disp: , Rfl:   •  cinacalcet (SENSIPAR) 60 MG tablet, Take 1 tablet by mouth daily., Disp: , Rfl:   •  cloNIDine (Catapres) 0.1 MG tablet, Take 1 tablet by mouth Every 8 (Eight) Hours As Needed for High Blood Pressure (BP >180/100 mm Hg)., Disp: 10 tablet, Rfl: 5  •  irbesartan (Avapro) 150 MG tablet, Take 1 tablet by mouth Daily., Disp: 30 tablet, Rfl: 1  •  levothyroxine (Synthroid) 100 MCG tablet, Take 1 tablet by mouth Daily., Disp: 90 tablet, Rfl: 1  •  nebivolol (BYSTOLIC) 10 MG tablet, TAKE 1 TABLET BY MOUTH EVERY DAY, Disp: 30 tablet, Rfl: 5  •  nitroglycerin (NITROSTAT) 0.4 MG SL tablet, Place 1 tablet under the tongue Every 5 (Five) Minutes As Needed for Chest Pain. Take no more than 3 doses in 15 minutes., Disp: 25 tablet, Rfl: 5  •  pantoprazole (PROTONIX)  "40 MG EC tablet, Take 40 mg by mouth As Needed., Disp: , Rfl:   •  PREMARIN 0.625 MG/GM vaginal cream, 1 (One) Time Per Week., Disp: , Rfl:   •  Repatha SureClick solution auto-injector SureClick injection, ADMINISTER 1 ML(140 MG) UNDER THE SKIN EVERY 2 WEEKS, Disp: 6 mL, Rfl: 3  •  Unable to find, 1 each 1 (One) Time. Med Name: Eye injections for Macular Degeneration.     Eylea every 6-8 weeks., Disp: , Rfl:     Allergies:   Allergies   Allergen Reactions   • Pravachol [Pravastatin Sodium] Myalgia   • Statins Myalgia   • Zocor [Simvastatin] Myalgia   • Aspirin Rash   • Bactrim [Sulfamethoxazole-Trimethoprim] Rash   • Crestor [Rosuvastatin Calcium] Myalgia   • Lipitor [Atorvastatin] Myalgia       The following portions of the patient's history were reviewed and updated as appropriate: allergies, current medications, past family history, past medical history, past social history, past surgical history and problem list.        Objective    Objective      Vital Signs:   Vitals:    01/27/22 1308   BP: 144/86   Weight: 60.8 kg (134 lb 0.6 oz)   Height: 156.2 cm (61.5\")       Ortho Exam:  Left shoulder still demonstrates forward flexion and abduction more than 90 degrees.  Painful arc of motion however.  130/130/45/80/70 some improvements with internal rotation  Some pain with Jobes testing positive Neer positive Aldridge testing positive impingement    Results Review:  Imaging Results (Last 24 Hours)     ** No results found for the last 24 hours. **          MRI Shoulder Left Without Contrast    Result Date: 10/11/2021   1. Full-thickness tear of the more superior fibers of the subscapularis tendon. 2. Supraspinatus tendinosis. 3. Acromioclavicular joint space arthrosis.  This report was finalized on 10/11/2021 4:26 PM by Paul Miranda M.D..      As noted prior    Procedures    Left SHOULDER SUBACROMIAL SPACE INJECTION: Risks and benefits of a shoulder subacromial space injection were discussed and the patient " desired to proceed. Verbal consent was obtained. The patient understood the risk of infection, potential skin changes, bump in blood glucose especially with diabetes, nerve injury, possibility of increased pain in the short term, and possible incomplete pain relief.  Using sterile technique, the shoulder subacromial space was injected from a posterior approach with 1mL of 40 mg triamcinolone acetonide 40 MG/ML and 4cc of lidocaine with aspiration prior to injection. The patient tolerated the procedure without difficulty.  CPT CODE 54577 for major joint aspiration/injection          Assessment / Plan      Assessment/Plan:   Problem List Items Addressed This Visit        Musculoskeletal and Injuries    Adhesive capsulitis of left shoulder    Bursitis of left shoulder    Impingement syndrome of left shoulder    Nontraumatic complete tear of left rotator cuff - Primary    Biceps tendinitis of left upper extremity    Subluxation of tendon of long head of biceps          LEFT SHOULDER  Continued pain and dysfunction with known rotator cuff tearing.  She is exhausted physical therapy.  She desires to avoid surgery and I agree.  I would like to stick to conservative course as noted below.  We will proceed with a subacromial injection today.    Counseled again on operative versus nonoperative care and risks and benefits.  Counseled on continue with home exercise program and steroid injection, activity modifications.      Follow Up: She will keep me updated over the next 2 to 3 months pending progress.      Gildardo Harris MD, FAAOS  Orthopedic Surgeon  Fellowship Trained Shoulder and Elbow Surgeon  [facility]  Orthopedics and Sports Medicine  [address] [place] [address], [address]  [place] [address]    01/27/22  13:40 EST

## 2022-02-22 ENCOUNTER — OFFICE VISIT (OUTPATIENT)
Dept: CARDIOLOGY | Facility: CLINIC | Age: 82
End: 2022-02-22

## 2022-02-22 VITALS
HEART RATE: 67 BPM | HEIGHT: 62 IN | DIASTOLIC BLOOD PRESSURE: 62 MMHG | WEIGHT: 132.25 LBS | SYSTOLIC BLOOD PRESSURE: 150 MMHG | BODY MASS INDEX: 24.34 KG/M2 | OXYGEN SATURATION: 97 %

## 2022-02-22 DIAGNOSIS — I10 ESSENTIAL HYPERTENSION: ICD-10-CM

## 2022-02-22 DIAGNOSIS — E78.5 HYPERLIPIDEMIA LDL GOAL <100: ICD-10-CM

## 2022-02-22 DIAGNOSIS — I25.10 CORONARY ARTERY DISEASE INVOLVING NATIVE CORONARY ARTERY OF NATIVE HEART WITHOUT ANGINA PECTORIS: Primary | ICD-10-CM

## 2022-02-22 PROCEDURE — 99213 OFFICE O/P EST LOW 20 MIN: CPT | Performed by: INTERNAL MEDICINE

## 2022-02-22 RX ORDER — AFLIBERCEPT 40 MG/ML
INJECTION, SOLUTION INTRAVITREAL AS NEEDED
COMMUNITY

## 2022-02-22 RX ORDER — DENOSUMAB 60 MG/ML
60 INJECTION SUBCUTANEOUS ONCE
COMMUNITY

## 2022-02-22 RX ORDER — LEVOTHYROXINE SODIUM 0.2 MG/1
200 TABLET ORAL DAILY
COMMUNITY
End: 2022-08-29 | Stop reason: SDUPTHER

## 2022-02-22 NOTE — PROGRESS NOTES
[facility] at [facility]  Office visit  Nicolle Chavez  1940  [phone]    VISIT DATE:  2/22/2022      PCP: Christelle Gamez DO  [id number] [id number]    CC:  Chief Complaint   Patient presents with   • Coronary Artery Disease   • Chest Pain   • Hypertension       PROBLEM LIST:  1. Coronary artery disease:  a. Field ST-elevation myocardial infarction, 11/25/2015, with associated chest pain, jaw pain, diaphoresis, and dyspnea; inferior-anterior ST elevation by outside telemetry strips.   b. Emergent cardiac catheterization, Dr. Isael Rosenbaum: Status post drug-eluting stent to the proximal right coronary artery using a 2.25 x 23 mm Xience Alpine stent.   c. Ejection fraction 55% to 60%.   2. Uncontrolled Essential HTN.  3. Dyspnea.   4. Mixed Dyslipidemia.   5. Hypothyroidism, on chronic supplementation.   6. 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   7. Lower extremity edema.   8. History of peptic ulcer disease and bleeding ulcer approximately 12 years ago; incomplete database.   9. Family history of coronary disease.   10. Anxiety.   11. Gout.   12. Surgical history:  a. Hysterectomy.   b. Left parathyroidectomy.  c. Ulcer repair.       Previous cardiac studies and procedures:  October 2019 2-week ambulatory ECG monitor:  · Rare PACs which are intermittently symptomatic. Episodes of nonsustained atrial tachycardia.    ASSESSMENT:   Diagnosis Plan   1. Coronary artery disease involving native coronary artery of native heart without angina pectoris     2. Essential hypertension     3. Hyperlipidemia LDL goal <100         PLAN:  Coronary artery disease: Stable.  Continue aspirin, beta blockade, statin.    Hyperlipidemia: Significant improvement in overall cholesterol profile after initiation of PCSK9 inhibitor.  Continue current medical therapy.      Hypertension: Goal less than 130/80 mmHg. Did not tolerate thiazide diuretics.   "Continue amlodipine 5 mg and nebivolol 10 mg every morning, continue clonidine 0.1 mg p.o. as needed for blood pressures greater than 180/100 mmHg.  Moving irbesartan to nighttime dosing.  Continue to keep home blood pressure log.    Symptomatic PACs: Currently well controlled.  Continue beta-blockade.  Known episodes of asymptomatic nonsustained atrial tachycardia.    Subjective  Developed myalgias on atorvastatin. Denies palpitations.  Still has intermittent left shoulder discomfort, ongoing orthopedic evaluation.  Still under some increased emotional stress due to recent illness of her  who is 88.  She has not been recently keeping track of her blood pressure consistently.  She is taking all of her blood pressures in the morning and feels fatigued and washed out 2 to 4 hours after taking all of her medications.    PHYSICAL EXAMINATION:  Vitals:    02/22/22 1124   BP: 150/62   BP Location: Right arm   Patient Position: Sitting   Pulse: 67   SpO2: 97%   Weight: 60 kg (132 lb 4 oz)   Height: 157.5 cm (62\")     General Appearance:    Alert, cooperative, no distress, appears stated age   Head:    Normocephalic, without obvious abnormality, atraumatic   Eyes:    conjunctiva/corneas clear   Nose:   Nares normal, septum midline, mucosa normal, no drainage   Throat:   Lips, teeth and gums normal   Neck:   Supple, symmetrical, trachea midline, no carotid    bruit or JVD   Lungs:     Clear to auscultation bilaterally, respirations unlabored   Chest Wall:    No tenderness or deformity    Heart:    Regular rate and rhythm, S1 and S2 normal, no murmur, rub   or gallop, normal carotid impulse bilaterally without bruit.   Abdomen:     Soft, non-tender   Extremities:   Extremities normal, atraumatic, no cyanosis or edema   Pulses:   2+ and symmetric all extremities   Skin:   Skin color, texture, turgor normal, no rashes or lesions       Diagnostic Data:  Procedures  Lab Results   Component Value Date    CHLPL 181 " 10/14/2020    TRIG 184 (H) 12/15/2021    HDL 59 12/15/2021     Lab Results   Component Value Date    GLUCOSE 102 (H) 04/19/2021    BUN 27 (H) 04/19/2021    CREATININE 0.97 04/19/2021     04/19/2021    K 3.5 04/19/2021    CL 98 04/19/2021    CO2 30.1 (H) 04/19/2021     Lab Results   Component Value Date    HGBA1C 5.2 01/21/2016     Lab Results   Component Value Date    WBC 5.30 04/19/2021    HGB 12.4 04/19/2021    HCT 37.8 04/19/2021     04/19/2021       Allergies  Allergies   Allergen Reactions   • Pravachol [Pravastatin Sodium] Myalgia   • Statins Myalgia   • Zocor [Simvastatin] Myalgia   • Aspirin Rash   • Bactrim [Sulfamethoxazole-Trimethoprim] Rash   • Crestor [Rosuvastatin Calcium] Myalgia   • Lipitor [Atorvastatin] Myalgia       Current Medications    Current Outpatient Medications:   •  Aflibercept (Eylea) 2 MG/0.05ML solution, by Intravitreal route As Needed., Disp: , Rfl:   •  amLODIPine (NORVASC) 5 MG tablet, Take 1 tablet by mouth Daily., Disp: 90 tablet, Rfl: 3  •  aspirin 81 MG tablet, Take 1 tablet by mouth daily., Disp: , Rfl:   •  cinacalcet (SENSIPAR) 60 MG tablet, Take 1 tablet by mouth daily., Disp: , Rfl:   •  cloNIDine (Catapres) 0.1 MG tablet, Take 1 tablet by mouth Every 8 (Eight) Hours As Needed for High Blood Pressure (BP >180/100 mm Hg)., Disp: 10 tablet, Rfl: 5  •  denosumab (Prolia) 60 MG/ML solution prefilled syringe syringe, Inject 60 mg under the skin into the appropriate area as directed 1 (One) Time. 2 TIMES A YEAR, Disp: , Rfl:   •  irbesartan (Avapro) 150 MG tablet, Take 1 tablet by mouth Daily., Disp: 30 tablet, Rfl: 1  •  levothyroxine (SYNTHROID, LEVOTHROID) 200 MCG tablet, Take 200 mcg by mouth Daily., Disp: , Rfl:   •  Multiple Vitamins-Minerals (PRESERVISION AREDS 2 PO), Take 1 tablet by mouth Daily., Disp: , Rfl:   •  nebivolol (BYSTOLIC) 10 MG tablet, TAKE 1 TABLET BY MOUTH EVERY DAY, Disp: 30 tablet, Rfl: 5  •  nitroglycerin (NITROSTAT) 0.4 MG SL tablet, Place  1 tablet under the tongue Every 5 (Five) Minutes As Needed for Chest Pain. Take no more than 3 doses in 15 minutes., Disp: 25 tablet, Rfl: 5  •  pantoprazole (PROTONIX) 40 MG EC tablet, Take 40 mg by mouth As Needed., Disp: , Rfl:   •  PREMARIN 0.625 MG/GM vaginal cream, Insert  into the vagina 1 (One) Time Per Week., Disp: , Rfl:   •  Repatha SureClick solution auto-injector SureClick injection, ADMINISTER 1 ML(140 MG) UNDER THE SKIN EVERY 2 WEEKS, Disp: 6 mL, Rfl: 3          ROS  Review of Systems   Cardiovascular: Positive for chest pain, dyspnea on exertion, leg swelling and palpitations.   Respiratory: Positive for shortness of breath and snoring. Negative for cough and sputum production.        SOCIAL HX  Social History     Socioeconomic History   • Marital status:    Tobacco Use   • Smoking status: Never Smoker   • Smokeless tobacco: Never Used   Vaping Use   • Vaping Use: Never used   Substance and Sexual Activity   • Alcohol use: No   • Drug use: No   • Sexual activity: Not Currently     Partners: Male       FAMILY HX  Family History   Problem Relation Age of Onset   • Anemia Mother    • Arthritis Mother    • Heart disease Mother    • Hypertension Mother    • Stroke Mother    • Broken bones Mother    • Osteoporosis Mother    • Cancer Father         stomach   • Breast cancer Neg Hx    • Ovarian cancer Neg Hx    • Colon cancer Neg Hx    • Endometrial cancer Neg Hx              Rick Qiu III, MD, [facility]

## 2022-04-28 ENCOUNTER — OFFICE VISIT (OUTPATIENT)
Dept: INTERNAL MEDICINE | Facility: CLINIC | Age: 82
End: 2022-04-28

## 2022-04-28 ENCOUNTER — LAB (OUTPATIENT)
Dept: LAB | Facility: HOSPITAL | Age: 82
End: 2022-04-28

## 2022-04-28 VITALS
SYSTOLIC BLOOD PRESSURE: 132 MMHG | BODY MASS INDEX: 25.21 KG/M2 | HEIGHT: 62 IN | WEIGHT: 137 LBS | DIASTOLIC BLOOD PRESSURE: 70 MMHG | OXYGEN SATURATION: 98 % | HEART RATE: 63 BPM

## 2022-04-28 DIAGNOSIS — G25.2 RESTING TREMOR: ICD-10-CM

## 2022-04-28 DIAGNOSIS — I10 ESSENTIAL HYPERTENSION: ICD-10-CM

## 2022-04-28 DIAGNOSIS — I25.10 CORONARY ARTERY DISEASE INVOLVING NATIVE CORONARY ARTERY OF NATIVE HEART WITHOUT ANGINA PECTORIS: ICD-10-CM

## 2022-04-28 DIAGNOSIS — E78.5 HYPERLIPIDEMIA LDL GOAL <100: ICD-10-CM

## 2022-04-28 DIAGNOSIS — E03.9 ACQUIRED HYPOTHYROIDISM: ICD-10-CM

## 2022-04-28 DIAGNOSIS — E78.5 HYPERLIPIDEMIA LDL GOAL <100: Primary | ICD-10-CM

## 2022-04-28 LAB
ALBUMIN SERPL-MCNC: 4.6 G/DL (ref 3.5–5.2)
ALBUMIN/GLOB SERPL: 2 G/DL
ALP SERPL-CCNC: 76 U/L (ref 39–117)
ALT SERPL W P-5'-P-CCNC: 12 U/L (ref 1–33)
ANION GAP SERPL CALCULATED.3IONS-SCNC: 12.2 MMOL/L (ref 5–15)
AST SERPL-CCNC: 16 U/L (ref 1–32)
BASOPHILS # BLD AUTO: 0.02 10*3/MM3 (ref 0–0.2)
BASOPHILS NFR BLD AUTO: 0.5 % (ref 0–1.5)
BILIRUB SERPL-MCNC: 0.7 MG/DL (ref 0–1.2)
BUN SERPL-MCNC: 9 MG/DL (ref 8–23)
BUN/CREAT SERPL: 11.7 (ref 7–25)
CALCIUM SPEC-SCNC: 9.7 MG/DL (ref 8.6–10.5)
CHLORIDE SERPL-SCNC: 102 MMOL/L (ref 98–107)
CHOLEST SERPL-MCNC: 192 MG/DL (ref 0–200)
CO2 SERPL-SCNC: 23.8 MMOL/L (ref 22–29)
CREAT SERPL-MCNC: 0.77 MG/DL (ref 0.57–1)
DEPRECATED RDW RBC AUTO: 42.7 FL (ref 37–54)
EGFRCR SERPLBLD CKD-EPI 2021: 76.6 ML/MIN/1.73
EOSINOPHIL # BLD AUTO: 0.07 10*3/MM3 (ref 0–0.4)
EOSINOPHIL NFR BLD AUTO: 1.7 % (ref 0.3–6.2)
ERYTHROCYTE [DISTWIDTH] IN BLOOD BY AUTOMATED COUNT: 12.9 % (ref 12.3–15.4)
GLOBULIN UR ELPH-MCNC: 2.3 GM/DL
GLUCOSE SERPL-MCNC: 88 MG/DL (ref 65–99)
HCT VFR BLD AUTO: 40.3 % (ref 34–46.6)
HDLC SERPL-MCNC: 63 MG/DL (ref 40–60)
HGB BLD-MCNC: 13.5 G/DL (ref 12–15.9)
IMM GRANULOCYTES # BLD AUTO: 0.02 10*3/MM3 (ref 0–0.05)
IMM GRANULOCYTES NFR BLD AUTO: 0.5 % (ref 0–0.5)
LDLC SERPL CALC-MCNC: 100 MG/DL (ref 0–100)
LDLC/HDLC SERPL: 1.51 {RATIO}
LYMPHOCYTES # BLD AUTO: 1.2 10*3/MM3 (ref 0.7–3.1)
LYMPHOCYTES NFR BLD AUTO: 28.9 % (ref 19.6–45.3)
MCH RBC QN AUTO: 30.1 PG (ref 26.6–33)
MCHC RBC AUTO-ENTMCNC: 33.5 G/DL (ref 31.5–35.7)
MCV RBC AUTO: 90 FL (ref 79–97)
MONOCYTES # BLD AUTO: 0.83 10*3/MM3 (ref 0.1–0.9)
MONOCYTES NFR BLD AUTO: 20 % (ref 5–12)
NEUTROPHILS NFR BLD AUTO: 2.01 10*3/MM3 (ref 1.7–7)
NEUTROPHILS NFR BLD AUTO: 48.4 % (ref 42.7–76)
NRBC BLD AUTO-RTO: 0.2 /100 WBC (ref 0–0.2)
PLATELET # BLD AUTO: 164 10*3/MM3 (ref 140–450)
PMV BLD AUTO: 10.6 FL (ref 6–12)
POTASSIUM SERPL-SCNC: 4.3 MMOL/L (ref 3.5–5.2)
PROT SERPL-MCNC: 6.9 G/DL (ref 6–8.5)
RBC # BLD AUTO: 4.48 10*6/MM3 (ref 3.77–5.28)
SODIUM SERPL-SCNC: 138 MMOL/L (ref 136–145)
TRIGL SERPL-MCNC: 168 MG/DL (ref 0–150)
TSH SERPL DL<=0.05 MIU/L-ACNC: 4.86 UIU/ML (ref 0.27–4.2)
VLDLC SERPL-MCNC: 29 MG/DL (ref 5–40)
WBC NRBC COR # BLD: 4.15 10*3/MM3 (ref 3.4–10.8)

## 2022-04-28 PROCEDURE — 85025 COMPLETE CBC W/AUTO DIFF WBC: CPT

## 2022-04-28 PROCEDURE — 99214 OFFICE O/P EST MOD 30 MIN: CPT | Performed by: INTERNAL MEDICINE

## 2022-04-28 PROCEDURE — 80053 COMPREHEN METABOLIC PANEL: CPT

## 2022-04-28 PROCEDURE — 84443 ASSAY THYROID STIM HORMONE: CPT

## 2022-04-28 PROCEDURE — 80061 LIPID PANEL: CPT

## 2022-04-28 NOTE — PROGRESS NOTES
Subjective   Nicolle Chavez is a 81 y.o. female.   Chief Complaint   Patient presents with   • Hypertension   • Hypothyroidism   • Hyperlipidemia   • Coronary Artery Disease       History of Present Illness   Here for f/u on chronic conditions: HTN, HLP, CAD, and hypothyroid. HTN controlled with Clonidine, Bystolic, Edarbi, and Norvasc. HLP controlled with Repatha. Hypothyroid controlled with synthroid. CAD controlled with ASA and Repatha. No SOB. No heart palpitations.   The following portions of the patient's history were reviewed and updated as appropriate: allergies, current medications, past family history, past medical history, past social history, past surgical history and problem list.  Past Medical History:   Diagnosis Date   • Abdominal pain     Last Impression: 26 May 2015  Improving. The patient states she had a history of lower   abdominal pain after taking antibiotics for UTI, but this has improved.  Sanjiv Mak (Gastroenterology)   • Abnormal weight gain    • Anxiety 12/12/2016   • Arthritis of neck    • Cataract    • Cataract 2014   • Cataracts, bilateral    • Coronary artery disease 12/12/2016    Field ST-elevation myocardial infarction, 11/25/2015, with associated chest pain, jaw pain, diaphoresis, and dyspnea; inferior-anterior ST elevation by outside telemetry strips.  Emergent cardiac catheterization, Dr. Isael Rosenbaum:  Status post drug-eluting stent to the proximal right coronary artery using a 2.25 x 23 mm Xience Alpine stent.  Ejection fraction 55% to 60%.    • CTS (carpal tunnel syndrome)    • Dysphagia 8/15/2016    Last Impression: 26 May 2015  History of intermittent dysphagia symptoms. Differentials     include esophageal dysmotility, Schatzki's ring.  Sanjiv Mak (Gastroenterology)     • Fracture of wrist    • Gout     2012   • Gout 2012   • H/O bone density study    • Headache    • Headache    • High cholesterol     1995   • Hyperparathyroidism (HCC)     2009   •  Hypertension    • Hypothyroidism    • Low back strain    • Myalgia and myositis 8/15/2016   • Myocardial infarction (HCC)    • Neck strain    • Palpitations 9/25/2017   • Parathyroid disorder (HCC) 12/12/2016    with a history of a left parathyroid parathyroidectomy.    • Peptic ulcer disease     and bleeding ulcer approximately 12 years ago; incomplete database.    • Rotator cuff syndrome january 2021   • Scoliosis    • Sleep apnea      Past Surgical History:   Procedure Laterality Date   • APPENDECTOMY     • BREAST BIOPSY Right 1995    Ultrasound guided needle biopsy   • COLONOSCOPY     • HYSTERECTOMY Bilateral 1978   • OOPHORECTOMY     • OTHER SURGICAL HISTORY      Ulcer repair   • PARATHYROIDECTOMY Left    • WRIST SURGERY  2012     Family History   Problem Relation Age of Onset   • Anemia Mother    • Arthritis Mother    • Heart disease Mother    • Hypertension Mother    • Stroke Mother    • Broken bones Mother    • Osteoporosis Mother    • Cancer Father         stomach   • Breast cancer Neg Hx    • Ovarian cancer Neg Hx    • Colon cancer Neg Hx    • Endometrial cancer Neg Hx      Social History     Socioeconomic History   • Marital status:    Tobacco Use   • Smoking status: Never Smoker   • Smokeless tobacco: Never Used   Vaping Use   • Vaping Use: Never used   Substance and Sexual Activity   • Alcohol use: No   • Drug use: No   • Sexual activity: Not Currently     Partners: Male     Current Outpatient Medications on File Prior to Visit   Medication Sig Dispense Refill   • Aflibercept (Eylea) 2 MG/0.05ML solution by Intravitreal route As Needed.     • amLODIPine (NORVASC) 5 MG tablet Take 1 tablet by mouth Daily. 90 tablet 3   • aspirin 81 MG tablet Take 1 tablet by mouth daily.     • cinacalcet (SENSIPAR) 60 MG tablet Take 1 tablet by mouth daily.     • cloNIDine (Catapres) 0.1 MG tablet Take 1 tablet by mouth Every 8 (Eight) Hours As Needed for High Blood Pressure (BP >180/100 mm Hg). 10 tablet 5   •  "denosumab (Prolia) 60 MG/ML solution prefilled syringe syringe Inject 60 mg under the skin into the appropriate area as directed 1 (One) Time. 2 TIMES A YEAR     • irbesartan (Avapro) 150 MG tablet Take 1 tablet by mouth Daily. 30 tablet 1   • levothyroxine (SYNTHROID, LEVOTHROID) 200 MCG tablet Take 200 mcg by mouth Daily.     • nebivolol (BYSTOLIC) 10 MG tablet TAKE 1 TABLET BY MOUTH EVERY DAY 30 tablet 5   • nitroglycerin (NITROSTAT) 0.4 MG SL tablet Place 1 tablet under the tongue Every 5 (Five) Minutes As Needed for Chest Pain. Take no more than 3 doses in 15 minutes. 25 tablet 5   • PREMARIN 0.625 MG/GM vaginal cream Insert  into the vagina 1 (One) Time Per Week.     • Repatha SureClick solution auto-injector SureClick injection ADMINISTER 1 ML(140 MG) UNDER THE SKIN EVERY 2 WEEKS 6 mL 3   • [DISCONTINUED] Multiple Vitamins-Minerals (PRESERVISION AREDS 2 PO) Take 1 tablet by mouth Daily.     • pantoprazole (PROTONIX) 40 MG EC tablet Take 40 mg by mouth As Needed.       No current facility-administered medications on file prior to visit.       Review of Systems   Constitutional: Negative for chills and fatigue.   Respiratory: Negative for cough and shortness of breath.    Cardiovascular: Negative for chest pain and leg swelling.   Gastrointestinal: Negative for nausea and vomiting.   Musculoskeletal:        Shoulder pain, left sees ortho   Neurological: Negative for weakness.   Psychiatric/Behavioral: The patient is not nervous/anxious and is not hyperactive.      /70   Pulse 63   Ht 157.5 cm (62\")   Wt 62.1 kg (137 lb)   SpO2 98%   BMI 25.06 kg/m²     Objective   Physical Exam  Vitals reviewed.   Cardiovascular:      Rate and Rhythm: Normal rate and regular rhythm.      Heart sounds: No murmur heard.  Pulmonary:      Effort: No respiratory distress.      Breath sounds: No wheezing.   Abdominal:      General: There is no distension.      Tenderness: There is no abdominal tenderness.   Neurological: "      General: No focal deficit present.      Mental Status: She is alert and oriented to person, place, and time.      Comments: Resting tremor on left hand   Psychiatric:         Mood and Affect: Mood normal.         Behavior: Behavior normal.         Assessment/Plan   Diagnoses and all orders for this visit:    1. Hyperlipidemia LDL goal <100 (Primary)  -     Comprehensive Metabolic Panel; Future  -     Lipid Panel; Future  Continue Repatha once a week  2. Essential hypertension  -     CBC & Differential; Future  Continue clonidine 0.1 mg q 8, bystolic 10 mg po qd, Edarbi 40 mg o qd, and clonidine 10 mg po qd  3. Coronary artery disease involving native coronary artery of native heart without angina pectoris  Continue Asa 81mg po qd and Repatha once a week  4. Acquired hypothyroidism  -     TSH; Future  Continue synthroid 100 mcg po qd  5. Resting tremor  -     Ambulatory Referral to Neurology

## 2022-04-29 DIAGNOSIS — E03.9 HYPOTHYROIDISM (ACQUIRED): Primary | ICD-10-CM

## 2022-04-29 DIAGNOSIS — R79.89 ABNORMAL CBC: ICD-10-CM

## 2022-04-29 RX ORDER — IRBESARTAN 150 MG/1
TABLET ORAL
Qty: 30 TABLET | Refills: 0 | Status: SHIPPED | OUTPATIENT
Start: 2022-04-29 | End: 2022-05-23

## 2022-05-02 RX ORDER — NEBIVOLOL 10 MG/1
TABLET ORAL
Qty: 90 TABLET | Refills: 1 | Status: SHIPPED | OUTPATIENT
Start: 2022-05-02 | End: 2022-11-07

## 2022-05-23 RX ORDER — IRBESARTAN 150 MG/1
TABLET ORAL
Qty: 90 TABLET | Refills: 1 | Status: SHIPPED | OUTPATIENT
Start: 2022-05-23

## 2022-06-28 ENCOUNTER — OFFICE VISIT (OUTPATIENT)
Dept: CARDIOLOGY | Facility: CLINIC | Age: 82
End: 2022-06-28

## 2022-06-28 VITALS
HEART RATE: 62 BPM | OXYGEN SATURATION: 97 % | WEIGHT: 134 LBS | BODY MASS INDEX: 24.66 KG/M2 | HEIGHT: 62 IN | SYSTOLIC BLOOD PRESSURE: 138 MMHG | DIASTOLIC BLOOD PRESSURE: 62 MMHG

## 2022-06-28 DIAGNOSIS — I10 ESSENTIAL HYPERTENSION: ICD-10-CM

## 2022-06-28 DIAGNOSIS — E78.5 HYPERLIPIDEMIA LDL GOAL <100: ICD-10-CM

## 2022-06-28 DIAGNOSIS — I25.10 CORONARY ARTERY DISEASE INVOLVING NATIVE CORONARY ARTERY OF NATIVE HEART WITHOUT ANGINA PECTORIS: Primary | ICD-10-CM

## 2022-06-28 PROCEDURE — 99214 OFFICE O/P EST MOD 30 MIN: CPT | Performed by: INTERNAL MEDICINE

## 2022-06-28 RX ORDER — PREDNISOLONE ACETATE 10 MG/ML
SUSPENSION/ DROPS OPHTHALMIC
COMMUNITY
Start: 2022-06-23 | End: 2022-08-29

## 2022-06-28 NOTE — PROGRESS NOTES
[facility] at [facility]  Office visit  Nicolle Chavez  1940  [phone]    VISIT DATE:  6/28/2022      PCP: Christelle Gamez DO  [address] [facility] [address]    CC:  Chief Complaint   Patient presents with   • Coronary artery disease involving native coronary artery of       PROBLEM LIST:  1. Coronary artery disease:  a. Field ST-elevation myocardial infarction, 11/25/2015, with associated chest pain, jaw pain, diaphoresis, and dyspnea; inferior-anterior ST elevation by outside telemetry strips.   b. Emergent cardiac catheterization, Dr. Isael Rosenbaum: Status post drug-eluting stent to the proximal right coronary artery using a 2.25 x 23 mm Xience Alpine stent.   c. Ejection fraction 55% to 60%.   2. Uncontrolled Essential HTN.  3. Dyspnea.   4. Mixed Dyslipidemia.   5. Hypothyroidism, on chronic supplementation.   6. 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   7. Lower extremity edema.   8. History of peptic ulcer disease and bleeding ulcer approximately 12 years ago; incomplete database.   9. Family history of coronary disease.   10. Anxiety.   11. Gout.   12. Surgical history:  a. Hysterectomy.   b. Left parathyroidectomy.  c. Ulcer repair.       Previous cardiac studies and procedures:  October 2019 2-week ambulatory ECG monitor:  · Rare PACs which are intermittently symptomatic. Episodes of nonsustained atrial tachycardia.    ASSESSMENT:   Diagnosis Plan   1. Coronary artery disease involving native coronary artery of native heart without angina pectoris     2. Essential hypertension     3. Hyperlipidemia LDL goal <100         PLAN:  Coronary artery disease: Stable.  Continue current medical therapy.  Decreased low-dose aspirin to every other day dosing due to her potential concerns for developing gastric ulcer or gastritis.    Hyperlipidemia: Significant improvement in overall cholesterol profile after initiation of PCSK9  "inhibitor.  Continue current medical therapy.      Hypertension: Goal less than 130/80 mmHg. Did not tolerate thiazide diuretics.  Continue amlodipine 5 mg and nebivolol 10 mg every morning, continue clonidine 0.1 mg p.o. as needed for blood pressures greater than 180/100 mmHg.  Continue irbesartan 150 mg p.o. nightly..  Continue to keep home blood pressure log.    Symptomatic PACs: Currently well controlled.  Continue beta-blockade.  Known episodes of asymptomatic nonsustained atrial tachycardia.    Subjective  Developed myalgias on atorvastatin. Denies palpitations.  Blood pressures have been slightly elevated recently however she has been undergoing treatment for macular degeneration with laser surgery left eye, upcoming evaluation of her right eye next week.  Denies chest pain or dyspnea.    PHYSICAL EXAMINATION:  Vitals:    06/28/22 1038   BP: 138/62   BP Location: Right arm   Patient Position: Sitting   Pulse: 62   SpO2: 97%   Weight: 60.8 kg (134 lb)   Height: 157.5 cm (62\")     General Appearance:    Alert, cooperative, no distress, appears stated age   Head:    Normocephalic, without obvious abnormality, atraumatic   Eyes:    conjunctiva/corneas clear   Nose:   Nares normal, septum midline, mucosa normal, no drainage   Throat:   Lips, teeth and gums normal   Neck:   Supple, symmetrical, trachea midline, no carotid    bruit or JVD   Lungs:     Clear to auscultation bilaterally, respirations unlabored   Chest Wall:    No tenderness or deformity    Heart:    Regular rate and rhythm, S1 and S2 normal, no murmur, rub   or gallop, normal carotid impulse bilaterally without bruit.   Abdomen:     Soft, non-tender   Extremities:   Extremities normal, atraumatic, no cyanosis or edema   Pulses:   2+ and symmetric all extremities   Skin:   Skin color, texture, turgor normal, no rashes or lesions       Diagnostic Data:  Procedures  Lab Results   Component Value Date    CHLPL 181 10/14/2020    TRIG 168 (H) 04/28/2022    " HDL 63 (H) 04/28/2022     Lab Results   Component Value Date    GLUCOSE 88 04/28/2022    BUN 12 06/03/2022    CREATININE 0.85 06/03/2022     06/03/2022    K 4.5 06/03/2022     06/03/2022    CO2 26 06/03/2022     Lab Results   Component Value Date    HGBA1C 5.2 01/21/2016     Lab Results   Component Value Date    WBC 4.15 04/28/2022    HGB 13.5 04/28/2022    HCT 40.3 04/28/2022     04/28/2022       Allergies  Allergies   Allergen Reactions   • Pravachol [Pravastatin Sodium] Myalgia   • Statins Myalgia   • Zocor [Simvastatin] Myalgia   • Aspirin Rash   • Bactrim [Sulfamethoxazole-Trimethoprim] Rash   • Crestor [Rosuvastatin Calcium] Myalgia   • Lipitor [Atorvastatin] Myalgia       Current Medications    Current Outpatient Medications:   •  Aflibercept (Eylea) 2 MG/0.05ML solution, by Intravitreal route As Needed., Disp: , Rfl:   •  amLODIPine (NORVASC) 5 MG tablet, Take 1 tablet by mouth Daily., Disp: 90 tablet, Rfl: 3  •  aspirin 81 MG tablet, Take 1 tablet by mouth daily., Disp: , Rfl:   •  cinacalcet (SENSIPAR) 60 MG tablet, Take 1 tablet by mouth daily., Disp: , Rfl:   •  cloNIDine (Catapres) 0.1 MG tablet, Take 1 tablet by mouth Every 8 (Eight) Hours As Needed for High Blood Pressure (BP >180/100 mm Hg)., Disp: 10 tablet, Rfl: 5  •  denosumab (Prolia) 60 MG/ML solution prefilled syringe syringe, Inject 60 mg under the skin into the appropriate area as directed 1 (One) Time. 2 TIMES A YEAR, Disp: , Rfl:   •  irbesartan (AVAPRO) 150 MG tablet, TAKE 1 TABLET BY MOUTH EVERY DAY, Disp: 90 tablet, Rfl: 1  •  levothyroxine (SYNTHROID, LEVOTHROID) 200 MCG tablet, Take 200 mcg by mouth Daily., Disp: , Rfl:   •  nebivolol (BYSTOLIC) 10 MG tablet, TAKE 1 TABLET BY MOUTH EVERY DAY, Disp: 90 tablet, Rfl: 1  •  nitroglycerin (NITROSTAT) 0.4 MG SL tablet, Place 1 tablet under the tongue Every 5 (Five) Minutes As Needed for Chest Pain. Take no more than 3 doses in 15 minutes., Disp: 25 tablet, Rfl: 5  •   pantoprazole (PROTONIX) 40 MG EC tablet, Take 40 mg by mouth As Needed., Disp: , Rfl:   •  prednisoLONE acetate (PRED FORTE) 1 % ophthalmic suspension, Instill 1 drop in left eye twice a day. Shake Well Before Use, Disp: , Rfl:   •  PREMARIN 0.625 MG/GM vaginal cream, Insert  into the vagina 1 (One) Time Per Week., Disp: , Rfl:   •  Repatha SureClick solution auto-injector SureClick injection, ADMINISTER 1 ML(140 MG) UNDER THE SKIN EVERY 2 WEEKS, Disp: 6 mL, Rfl: 3          ROS  Review of Systems   Cardiovascular: Positive for chest pain, dyspnea on exertion, leg swelling and palpitations.   Respiratory: Positive for shortness of breath and snoring. Negative for cough and sputum production.        SOCIAL HX  Social History     Socioeconomic History   • Marital status:    Tobacco Use   • Smoking status: Never Smoker   • Smokeless tobacco: Never Used   Vaping Use   • Vaping Use: Never used   Substance and Sexual Activity   • Alcohol use: No   • Drug use: No   • Sexual activity: Not Currently     Partners: Male       FAMILY HX  Family History   Problem Relation Age of Onset   • Anemia Mother    • Arthritis Mother    • Heart disease Mother    • Hypertension Mother    • Stroke Mother    • Broken bones Mother    • Osteoporosis Mother    • Heart attack Mother    • Cancer Father         stomach   • Breast cancer Neg Hx    • Ovarian cancer Neg Hx    • Colon cancer Neg Hx    • Endometrial cancer Neg Hx              Rick Qiu III, MD, [facility]

## 2022-06-29 ENCOUNTER — OFFICE VISIT (OUTPATIENT)
Dept: NEUROLOGY | Facility: CLINIC | Age: 82
End: 2022-06-29

## 2022-06-29 VITALS
OXYGEN SATURATION: 99 % | SYSTOLIC BLOOD PRESSURE: 138 MMHG | DIASTOLIC BLOOD PRESSURE: 68 MMHG | HEART RATE: 67 BPM | HEIGHT: 62 IN | BODY MASS INDEX: 24.67 KG/M2 | WEIGHT: 134.04 LBS

## 2022-06-29 DIAGNOSIS — R25.9 PARKINSONIAN FEATURES: ICD-10-CM

## 2022-06-29 DIAGNOSIS — R25.1 TREMOR: Primary | ICD-10-CM

## 2022-06-29 PROCEDURE — 99204 OFFICE O/P NEW MOD 45 MIN: CPT | Performed by: PSYCHIATRY & NEUROLOGY

## 2022-06-29 NOTE — PROGRESS NOTES
Subjective:    CC: Nicolle Chavez is seen today in consultation at the request of Christelle Gamez DO for Establish Care (Tremor in hands)       HPI:  Patient is a 81-year-old female with past medical history of hypertension, hyperlipidemia, coronary artery disease referred to clinic for evaluation of tremors.  She reports that she started noticing tremors 6 to 8 months ago and it has slowly become slightly worse.  She reports that tremors are present in her left hand.  She reports that tremors are present when she is holding cup of coffee or glass of water, while using spoon or fork and is also present while resting.  She denies any head tremors.  There is no family history of tremors.  She denies any difficulty with walking.  No change in voice quality.  No episodes of acting out dreams at night.  She does report reduced ability to smell and taste in last few years.  No problems with handwriting.        The following portions of the patient's history were reviewed today and updated as of 06/29/2022  : allergies, social history and problem list.  This document will be scanned to patient's chart.      Current Outpatient Medications:   •  Aflibercept (Eylea) 2 MG/0.05ML solution, by Intravitreal route As Needed., Disp: , Rfl:   •  amLODIPine (NORVASC) 5 MG tablet, Take 1 tablet by mouth Daily., Disp: 90 tablet, Rfl: 3  •  aspirin 81 MG tablet, Take 1 tablet by mouth daily., Disp: , Rfl:   •  cinacalcet (SENSIPAR) 60 MG tablet, Take 1 tablet by mouth daily., Disp: , Rfl:   •  cloNIDine (Catapres) 0.1 MG tablet, Take 1 tablet by mouth Every 8 (Eight) Hours As Needed for High Blood Pressure (BP >180/100 mm Hg)., Disp: 10 tablet, Rfl: 5  •  denosumab (Prolia) 60 MG/ML solution prefilled syringe syringe, Inject 60 mg under the skin into the appropriate area as directed 1 (One) Time. 2 TIMES A YEAR, Disp: , Rfl:   •  irbesartan (AVAPRO) 150 MG tablet, TAKE 1 TABLET BY MOUTH EVERY DAY, Disp: 90 tablet, Rfl: 1  •   levothyroxine (SYNTHROID, LEVOTHROID) 200 MCG tablet, Take 200 mcg by mouth Daily., Disp: , Rfl:   •  nebivolol (BYSTOLIC) 10 MG tablet, TAKE 1 TABLET BY MOUTH EVERY DAY, Disp: 90 tablet, Rfl: 1  •  nitroglycerin (NITROSTAT) 0.4 MG SL tablet, Place 1 tablet under the tongue Every 5 (Five) Minutes As Needed for Chest Pain. Take no more than 3 doses in 15 minutes., Disp: 25 tablet, Rfl: 5  •  pantoprazole (PROTONIX) 40 MG EC tablet, Take 40 mg by mouth As Needed., Disp: , Rfl:   •  prednisoLONE acetate (PRED FORTE) 1 % ophthalmic suspension, Instill 1 drop in left eye twice a day. Shake Well Before Use, Disp: , Rfl:   •  PREMARIN 0.625 MG/GM vaginal cream, Insert  into the vagina 1 (One) Time Per Week., Disp: , Rfl:   •  Repatha SureClick solution auto-injector SureClick injection, ADMINISTER 1 ML(140 MG) UNDER THE SKIN EVERY 2 WEEKS, Disp: 6 mL, Rfl: 3   Past Medical History:   Diagnosis Date   • Abdominal pain     Last Impression: 26 May 2015  Improving. The patient states she had a history of lower   abdominal pain after taking antibiotics for UTI, but this has improved.  Sanjiv Mak (Gastroenterology)   • Abnormal weight gain    • Anxiety 12/12/2016   • Arthritis of neck    • Cataract    • Cataract 2014   • Cataracts, bilateral    • CHF (congestive heart failure) ([facility])    • Congenital heart disease    • Coronary artery disease 12/12/2016    Field ST-elevation myocardial infarction, 11/25/2015, with associated chest pain, jaw pain, diaphoresis, and dyspnea; inferior-anterior ST elevation by outside telemetry strips.  Emergent cardiac catheterization, Dr. Isael Rosenbaum:  Status post drug-eluting stent to the proximal right coronary artery using a 2.25 x 23 mm Xience Alpine stent.  Ejection fraction 55% to 60%.    • CTS (carpal tunnel syndrome)    • Dysphagia 08/15/2016    Last Impression: 26 May 2015  History of intermittent dysphagia symptoms. Differentials     include esophageal dysmotility, Schatzki's  "pedrito.  Sanjiv Mak (Gastroenterology)     • Fracture of wrist    • Gout     2012   • Gout 2012   • H/O bone density study    • Headache    • Headache    • High cholesterol     1995   • Hyperparathyroidism (HCC)     2009   • Hypertension    • Hypothyroidism    • Low back strain    • Myalgia and myositis 08/15/2016   • Myocardial infarction ([facility])    • Neck strain    • Palpitations 09/25/2017   • Parathyroid disorder ([facility]) 12/12/2016    with a history of a left parathyroid parathyroidectomy.    • Peptic ulcer disease     and bleeding ulcer approximately 12 years ago; incomplete database.    • Rotator cuff syndrome 01/2021   • Scoliosis    • Sleep apnea       Past Surgical History:   Procedure Laterality Date   • APPENDECTOMY     • BREAST BIOPSY Right 1995    Ultrasound guided needle biopsy   • CARDIAC CATHETERIZATION     • CAROTID STENT     • COLONOSCOPY     • CORONARY STENT PLACEMENT     • EYE SURGERY Left    • HYSTERECTOMY Bilateral 1978   • OOPHORECTOMY     • OTHER SURGICAL HISTORY      Ulcer repair   • PARATHYROIDECTOMY Left    • WRIST SURGERY  2012      Family History   Problem Relation Age of Onset   • Anemia Mother    • Arthritis Mother    • Heart disease Mother    • Hypertension Mother    • Stroke Mother    • Broken bones Mother    • Osteoporosis Mother    • Heart attack Mother    • Cancer Father         stomach   • Breast cancer Neg Hx    • Ovarian cancer Neg Hx    • Colon cancer Neg Hx    • Endometrial cancer Neg Hx       Review of Systems    All other systems reviewed and are negative     Objective:    /68   Pulse 67   Ht 157.5 cm (62.01\")   Wt 60.8 kg (134 lb 0.6 oz)   SpO2 99%   BMI 24.51 kg/m²     Neurology Exam:    General apperance: NAD.  Subtle masked face noted    Mental status: Alert, awake and oriented to time place and person.    Recent and Remote memory: Can recall 3/3 objects at 5 minutes. Can recall historical events.     Attention span and Concentration: Serial 7s: Normal. "     Fund of knowledge:  Normal.     Language and Speech: No aphasia or dysarthria.    Naming , Repitition and Comprehension:  Can name objects, repeat a sentence and follow commands. Speech is clear and fluent with good repetition, comprehension, and naming.    Cranial Nerves:   CN II: Visual fields are full. Intact. Fundi - Normal, No papillederma, Pupils - CHITRA  CN III, IV and VI: Extraocular movements are intact. Normal saccades.   CN V: Facial sensation is intact.   CN VII: Muscles of facial expression reveal no asymmetry. Intact.   CN VIII: Hearing is intact. Whispered voice intact.   CN IX and X: Palate elevates symmetrically. Intact  CN XI: Shoulder shrug is intact.   CN XII: Tongue is midline without evidence of atrophy or fasciculation.     Motor:  Right UE muscle strength 5/5. Normal tone.     Left UE muscle strength 5/5.  Subtle clasp knife rigidity noted     Right LE muscle strength5/5. Normal tone.     Left LE muscle strength 5/5. Normal tone.      Sensory: Normal light touch, vibration and pinprick sensation bilaterally.    DTRs: 2+ bilaterally in upper and lower extremities.    Babinski: Negative bilaterally.    Co-ordination: Normal finger-to-nose, heel to shin B/L.    Resting tremors on the left.  Tremors did improve while doing finger-to-nose.  No postural tremors noted.  Months became worse while walking.    Rhomberg: Negative.    Gait: Normal walking stride.  Likely reduced arm swing on the left.  Tremors became worse while walking.  No problems in turning.    Cardiovascular: Regular rate and rhythm without murmur, gallop or rub.    Ophthalmoscopic exam: Normal fundi, no papilledema.    Assessment and Plan:  1. Tremor  2. Parkinsonian features  She was noted to have very subtle clasp knife rigidity on the left, subtle masked face and resting tremors on the left.  While walking she was noted to have increased left hand tremors.  She was noted to have slight reduction in arm swing on the left but  she reports that she has been having some shoulder issues which could be causing it.  Based on my assessment, she likely has Parkinson's.  I would like to go ahead and order DaTscan to confirm the diagnosis.  If DaTscan confirms a diagnosis then my plan would be to start her on Sinemet.  I will plan to see her back in clinic in 6 weeks for follow-up.       Return in about 6 weeks (around 8/10/2022).     Jayy Thomson MD

## 2022-06-30 ENCOUNTER — APPOINTMENT (OUTPATIENT)
Dept: INFUSION THERAPY | Facility: HOSPITAL | Age: 82
End: 2022-06-30

## 2022-07-14 ENCOUNTER — TELEPHONE (OUTPATIENT)
Dept: NEUROLOGY | Facility: CLINIC | Age: 82
End: 2022-07-14

## 2022-08-04 NOTE — TELEPHONE ENCOUNTER
PT STATES THAT SHE RECEIVED A PACKET OF INFORMATION THAT SHE BELIEVES WOULD BE FROM [facility], SHE STATES THAT IT'S ASKING FOR INFORMATION, PERMISSION FROM MEMBER (PT TO ACCESS INFORMATION) PT IS ASKING IF THIS WAS SOMETHING THAT WAS INITIATED BY OFFICE. SHE STATES THAT [facility] DOESN'T KNOW WHAT THIS PAPER IS.     DID EXPLAIN TO PT THAT THE APPEAL WAS STARTED FOR CADY SCAN AND THAT PPW SHOULD HAVE BEEN INFORMING HER THAT ABOUT THE APPEAL, THAT OFFICE SENT INFORMATION TO GET THAT APPROVED AND THAT OFFICE TECHNICALLY WOULD HAVE BEEN CONSIDERED REPRESENTATIVE TO APPEAL FOR THE DATSCAN.     PT IS ASKING IF THE DATSCAN WAS APPROVED SHE DID RECEIVE CALL ABOUT THE DATSCAN BEING SCHEDULED.     SEEN APPROVAL IN REFERRAL INFORMED PT IS WAS APPROVED.

## 2022-08-04 NOTE — TELEPHONE ENCOUNTER
Called and advised Pt that the DaTScan was approved. There had been miscommunication and we had been asked to submit a new PA request instead of just appealing the original case and submitting additional supporting documents, so the original request was approved and the secondary request was denied because of this, but the exam is approved and scheduled. She voiced understanding.

## 2022-08-12 ENCOUNTER — TELEPHONE (OUTPATIENT)
Dept: NEUROLOGY | Facility: CLINIC | Age: 82
End: 2022-08-12

## 2022-08-12 NOTE — TELEPHONE ENCOUNTER
Caller: Nicolle Chavez    Relationship: Self    Best call back number: [phone] AVAILABLE UNTIL 12:30PM    What is the best time to reach you: AVAILABLE TODAY UNTIL 12:30PM OR AFTER 3:00PM (DR FIELD BETWEEN THESE TIMES)        What was the call regarding: PATIENT STATES AT THE LAST VISIT WITH DR. CAUSEY (06/29/2022) HE HAD WANTED TO ORDER A DATSCAN. PATIENT STATED IT WAS A BEAR TO SAY THE LEAST TO GET THE TEST APPROVED, SHE RECEIVED A LETTER IN THE MAIL YESTERDAY DATED 08/03/2022 STATING THE DOCTOR HAD WITHDREW HIS REQUEST FOR COVERAGE OF THE TESTING. PATIENT IS WONDERING WHY THE TEST NO LONGER NEEDS TO BE COMPLETED WHEN THIS TEST WAS SUPPOSED TO GIVE A DEFINITE ANSWER ON A DIAGNOSIS. PATIENT IS REQUESTING A CALL BACK REGARDING THE TEST AND THE DECISION TO WITHDRAW HAVING THE TEST DONE.     PLEASE REVIEW AND ADVISE.    Do you require a callback: YES

## 2022-08-12 NOTE — TELEPHONE ENCOUNTER
Can you let her know that insurance has denied the test and hence it was canceled.  When I see her next, we can retry it and see if it can get approved.

## 2022-08-15 NOTE — TELEPHONE ENCOUNTER
Caller: Nicolle Chavez    Relationship: Self    Best call back number: [phone]    What was the call regarding: PT CALLED AFTER RECEIVING DENIAL LETTER FROM HER INSURANCE OVER THE WEEKEND STATING THAT 2ND PA FOR BRAIN DATSCAN HAD BEEN DENIED.    I CALLED OVER AND SPOKE W/ FAHAD TO CLARIFY. FAHAD STATED THAT WHEN ORDER WAS INITIALLY PLACED AND PA WAS SUBMITTED- INSURANCE DENIED. FAHAD SPOKE W/ INSURANCE WHO INSTRUCTED HER TO INITIATE 2ND PA AS OPPOSED TO APPEALLING THE 1ST PA. INSURANCE THEN CAME BACK STATING THAT THEY ADVISED HER INCORRECTLY AND WANTED HER TO APPEAL INITIAL PA. INITIAL PA WAS APPROVED AFTER APPEAL WAS FILED. THE 2ND PA THAT WAS INITIATED WAS DENIED BECAUSE 1ST HAD BEEN APPROVED. I ADVISED PT OF THIS AND INSTRUCTED HER THAT SHE IS OKAY TO KEEP HER APPT ON Wednesday.    **PT IS SCHEDULED FOR BRAIN DATSCAN @ [facility] ON Wednesday, 8/17/22.    DOCUMENTING PER [employer].

## 2022-08-29 ENCOUNTER — OFFICE VISIT (OUTPATIENT)
Dept: INTERNAL MEDICINE | Facility: CLINIC | Age: 82
End: 2022-08-29

## 2022-08-29 ENCOUNTER — LAB (OUTPATIENT)
Dept: LAB | Facility: HOSPITAL | Age: 82
End: 2022-08-29

## 2022-08-29 VITALS
OXYGEN SATURATION: 98 % | TEMPERATURE: 97.9 F | BODY MASS INDEX: 23.92 KG/M2 | DIASTOLIC BLOOD PRESSURE: 72 MMHG | SYSTOLIC BLOOD PRESSURE: 118 MMHG | HEIGHT: 62 IN | WEIGHT: 130 LBS | HEART RATE: 62 BPM

## 2022-08-29 DIAGNOSIS — E03.9 HYPOTHYROIDISM (ACQUIRED): ICD-10-CM

## 2022-08-29 DIAGNOSIS — I10 ESSENTIAL HYPERTENSION: ICD-10-CM

## 2022-08-29 DIAGNOSIS — I25.10 CORONARY ARTERY DISEASE INVOLVING NATIVE CORONARY ARTERY OF NATIVE HEART WITHOUT ANGINA PECTORIS: ICD-10-CM

## 2022-08-29 DIAGNOSIS — Z00.00 MEDICARE ANNUAL WELLNESS VISIT, SUBSEQUENT: Primary | ICD-10-CM

## 2022-08-29 DIAGNOSIS — E03.9 ACQUIRED HYPOTHYROIDISM: ICD-10-CM

## 2022-08-29 DIAGNOSIS — E78.5 HYPERLIPIDEMIA LDL GOAL <100: ICD-10-CM

## 2022-08-29 DIAGNOSIS — K21.9 GASTROESOPHAGEAL REFLUX DISEASE, UNSPECIFIED WHETHER ESOPHAGITIS PRESENT: ICD-10-CM

## 2022-08-29 DIAGNOSIS — Z12.83 SKIN CANCER SCREENING: ICD-10-CM

## 2022-08-29 DIAGNOSIS — R79.89 ABNORMAL CBC: ICD-10-CM

## 2022-08-29 LAB
BASOPHILS # BLD AUTO: 0.01 10*3/MM3 (ref 0–0.2)
BASOPHILS NFR BLD AUTO: 0.3 % (ref 0–1.5)
DEPRECATED RDW RBC AUTO: 40.9 FL (ref 37–54)
EOSINOPHIL # BLD AUTO: 0.05 10*3/MM3 (ref 0–0.4)
EOSINOPHIL NFR BLD AUTO: 1.3 % (ref 0.3–6.2)
ERYTHROCYTE [DISTWIDTH] IN BLOOD BY AUTOMATED COUNT: 13.1 % (ref 12.3–15.4)
HCT VFR BLD AUTO: 40.1 % (ref 34–46.6)
HGB BLD-MCNC: 13.9 G/DL (ref 12–15.9)
IMM GRANULOCYTES # BLD AUTO: 0.02 10*3/MM3 (ref 0–0.05)
IMM GRANULOCYTES NFR BLD AUTO: 0.5 % (ref 0–0.5)
LYMPHOCYTES # BLD AUTO: 1.25 10*3/MM3 (ref 0.7–3.1)
LYMPHOCYTES NFR BLD AUTO: 32.2 % (ref 19.6–45.3)
MCH RBC QN AUTO: 29.8 PG (ref 26.6–33)
MCHC RBC AUTO-ENTMCNC: 34.7 G/DL (ref 31.5–35.7)
MCV RBC AUTO: 85.9 FL (ref 79–97)
MONOCYTES # BLD AUTO: 0.71 10*3/MM3 (ref 0.1–0.9)
MONOCYTES NFR BLD AUTO: 18.3 % (ref 5–12)
NEUTROPHILS NFR BLD AUTO: 1.84 10*3/MM3 (ref 1.7–7)
NEUTROPHILS NFR BLD AUTO: 47.4 % (ref 42.7–76)
NRBC BLD AUTO-RTO: 0 /100 WBC (ref 0–0.2)
PLATELET # BLD AUTO: 170 10*3/MM3 (ref 140–450)
PMV BLD AUTO: 10.3 FL (ref 6–12)
RBC # BLD AUTO: 4.67 10*6/MM3 (ref 3.77–5.28)
WBC NRBC COR # BLD: 3.88 10*3/MM3 (ref 3.4–10.8)

## 2022-08-29 PROCEDURE — G0439 PPPS, SUBSEQ VISIT: HCPCS | Performed by: INTERNAL MEDICINE

## 2022-08-29 PROCEDURE — 80061 LIPID PANEL: CPT

## 2022-08-29 PROCEDURE — 1160F RVW MEDS BY RX/DR IN RCRD: CPT | Performed by: INTERNAL MEDICINE

## 2022-08-29 PROCEDURE — 1170F FXNL STATUS ASSESSED: CPT | Performed by: INTERNAL MEDICINE

## 2022-08-29 PROCEDURE — 1125F AMNT PAIN NOTED PAIN PRSNT: CPT | Performed by: INTERNAL MEDICINE

## 2022-08-29 PROCEDURE — 84443 ASSAY THYROID STIM HORMONE: CPT

## 2022-08-29 PROCEDURE — 85025 COMPLETE CBC W/AUTO DIFF WBC: CPT

## 2022-08-29 RX ORDER — LEVOTHYROXINE SODIUM 0.2 MG/1
200 TABLET ORAL DAILY
Qty: 90 TABLET | Refills: 1 | Status: SHIPPED | OUTPATIENT
Start: 2022-08-29 | End: 2023-01-24

## 2022-08-29 NOTE — PROGRESS NOTES
The ABCs of the Annual Wellness Visit  Subsequent Medicare Wellness Visit    Chief Complaint   Patient presents with   • Medicare Wellness-subsequent   • Fatigue      Subjective    History of Present Illness:  Nicolle Chavez is a 81 y.o. female who presents for a Subsequent Medicare Wellness Visit.    The following portions of the patient's history were reviewed and   updated as appropriate: allergies, current medications, past family history, past medical history, past social history, past surgical history and problem list.    Compared to one year ago, the patient feels her physical   health is the same.    Compared to one year ago, the patient feels her mental   health is the same.    Recent Hospitalizations:  She was not admitted to the hospital during the last year.       Current Medical Providers:  Patient Care Team:  Christelle Gamez DO as PCP - General  Christelle Gamez DO as PCP - Family Medicine    Outpatient Medications Prior to Visit   Medication Sig Dispense Refill   • Aflibercept (Eylea) 2 MG/0.05ML solution by Intravitreal route As Needed.     • amLODIPine (NORVASC) 5 MG tablet Take 1 tablet by mouth Daily. 90 tablet 3   • aspirin 81 MG tablet Take 1 tablet by mouth daily.     • cinacalcet (SENSIPAR) 60 MG tablet Take 1 tablet by mouth daily.     • cloNIDine (Catapres) 0.1 MG tablet Take 1 tablet by mouth Every 8 (Eight) Hours As Needed for High Blood Pressure (BP >180/100 mm Hg). 10 tablet 5   • denosumab (Prolia) 60 MG/ML solution prefilled syringe syringe Inject 60 mg under the skin into the appropriate area as directed 1 (One) Time. 2 TIMES A YEAR     • irbesartan (AVAPRO) 150 MG tablet TAKE 1 TABLET BY MOUTH EVERY DAY 90 tablet 1   • nebivolol (BYSTOLIC) 10 MG tablet TAKE 1 TABLET BY MOUTH EVERY DAY 90 tablet 1   • nitroglycerin (NITROSTAT) 0.4 MG SL tablet Place 1 tablet under the tongue Every 5 (Five) Minutes As Needed for Chest Pain. Take no more than 3 doses in 15 minutes. 25  tablet 5   • pantoprazole (PROTONIX) 40 MG EC tablet Take 40 mg by mouth As Needed.     • PREMARIN 0.625 MG/GM vaginal cream Insert  into the vagina 1 (One) Time Per Week.     • Repatha SureClick solution auto-injector SureClick injection ADMINISTER 1 ML(140 MG) UNDER THE SKIN EVERY 2 WEEKS 6 mL 3   • levothyroxine (SYNTHROID, LEVOTHROID) 200 MCG tablet Take 200 mcg by mouth Daily.     • prednisoLONE acetate (PRED FORTE) 1 % ophthalmic suspension Instill 1 drop in left eye twice a day. Shake Well Before Use       No facility-administered medications prior to visit.       No opioid medication identified on active medication list. I have reviewed chart for other potential  high risk medication/s and harmful drug interactions in the elderly.          Aspirin is on active medication list. Aspirin use is indicated based on review of current medical condition/s. Pros and cons of this therapy have been discussed today. Benefits of this medication outweigh potential harm.  Patient has been encouraged to continue taking this medication.  .      Patient Active Problem List   Diagnosis   • Gastroesophageal reflux disease   • Chronic kidney disease   • Adenomatous polyp of colon   • Hyperlipidemia LDL goal <100   • Essential hypertension   • Acquired hypothyroidism   • Urinary incontinence   • Coronary artery disease involving native coronary artery without angina pectoris   • Parathyroid disorder (HCC)   • Anxiety   • Adhesive capsulitis of left shoulder   • Bursitis of left shoulder   • Impingement syndrome of left shoulder   • Nontraumatic complete tear of left rotator cuff   • Biceps tendinitis of left upper extremity   • Subluxation of tendon of long head of biceps   • Osteoporosis     Advance Care Planning  Advance Directive is not on file.  ACP discussion was held with the patient during this visit. Patient has an advance directive (not in EMR), copy requested.    Review of Systems   Constitutional: Negative for  "diaphoresis and fatigue.   HENT: Negative for sinus pressure and sneezing.    Respiratory: Negative for cough and shortness of breath.    Cardiovascular: Negative for chest pain and leg swelling.   Gastrointestinal: Negative for diarrhea, nausea and vomiting.   Genitourinary: Negative for flank pain and frequency.   Neurological: Negative for tremors and confusion.   Psychiatric/Behavioral: The patient is not nervous/anxious.         Objective    Vitals:    08/29/22 1302   BP: 118/72   Pulse: 62   Temp: 97.9 °F (36.6 °C)   SpO2: 98%   Weight: 59 kg (130 lb)   Height: 157.5 cm (62.01\")   PainSc:   8   PainLoc: Shoulder     Estimated body mass index is 23.77 kg/m² as calculated from the following:    Height as of this encounter: 157.5 cm (62.01\").    Weight as of this encounter: 59 kg (130 lb).    BMI is within normal parameters. No other follow-up for BMI required.      Does the patient have evidence of cognitive impairment? No    Physical Exam  Vitals reviewed.   HENT:      Right Ear: Tympanic membrane and ear canal normal.      Left Ear: Tympanic membrane and ear canal normal.   Cardiovascular:      Rate and Rhythm: Normal rate and regular rhythm.      Heart sounds: No murmur heard.  Pulmonary:      Effort: No respiratory distress.      Breath sounds: No wheezing.   Musculoskeletal:      Right lower leg: No edema.      Left lower leg: No edema.   Neurological:      General: No focal deficit present.      Mental Status: She is alert and oriented to person, place, and time.   Psychiatric:         Mood and Affect: Mood normal.         Behavior: Behavior normal.       Lab Results   Component Value Date     (H) 06/03/2022            HEALTH RISK ASSESSMENT    Smoking Status:  Social History     Tobacco Use   Smoking Status Never Smoker   Smokeless Tobacco Never Used     Alcohol Consumption:  Social History     Substance and Sexual Activity   Alcohol Use No     Fall Risk Screen:    STEADI Fall Risk Assessment was " completed, and patient is at LOW risk for falls.Assessment completed on:8/29/2022    Depression Screening:  PHQ-2/PHQ-9 Depression Screening 8/29/2022   Retired Total Score -   Little Interest or Pleasure in Doing Things 1-->several days   Feeling Down, Depressed or Hopeless 1-->several days   Trouble Falling or Staying Asleep, or Sleeping Too Much 0-->not at all   Feeling Tired or Having Little Energy 2-->more than half the days   Poor Appetite or Overeating 0-->not at all   Feeling Bad about Yourself - or that You are a Failure or Have Let Yourself or Your Family Down 0-->not at all   Trouble Concentrating on Things, Such as Reading the Newspaper or Watching Television 0-->not at all   Moving or Speaking So Slowly that Other People Could Have Noticed? Or the Opposite - Being So Fidgety 1-->several days   Thoughts that You Would be Better Off Dead or of Hurting Yourself in Some Way 0-->not at all   PHQ-9: Brief Depression Severity Measure Score 5   If You Checked Off Any Problems, How Difficult Have These Problems Made It For You to Do Your Work, Take Care of Things at Home, or Get Along with Other People? not difficult at all       Health Habits and Functional and Cognitive Screening:  Functional & Cognitive Status 8/29/2022   Do you have difficulty preparing food and eating? No   Do you have difficulty bathing yourself, getting dressed or grooming yourself? No   Do you have difficulty using the toilet? No   Do you have difficulty moving around from place to place? No   Do you have trouble with steps or getting out of a bed or a chair? No   Current Diet Well Balanced Diet   Dental Exam Up to date   Eye Exam Up to date   Exercise (times per week) -   Current Exercises Include Yard Work;House Cleaning;Walking   Current Exercise Activities Include -   Do you need help using the phone?  No   Are you deaf or do you have serious difficulty hearing?  No   Do you need help with transportation? No   Do you need help  shopping? No   Do you need help preparing meals?  No   Do you need help with housework?  No   Do you need help with laundry? No   Do you need help taking your medications? No   Do you need help managing money? No   Do you ever drive or ride in a car without wearing a seat belt? No   Have you felt unusual stress, anger or loneliness in the last month? No   Who do you live with? Spouse   If you need help, do you have trouble finding someone available to you? No   Have you been bothered in the last four weeks by sexual problems? No   Do you have difficulty concentrating, remembering or making decisions? No       Age-appropriate Screening Schedule:  Refer to the list below for future screening recommendations based on patient's age, sex and/or medical conditions. Orders for these recommended tests are listed in the plan section. The patient has been provided with a written plan.    Health Maintenance   Topic Date Due   • MAMMOGRAM  12/29/2022 (Originally 8/28/2022)   • INFLUENZA VACCINE  10/01/2022   • LIPID PANEL  04/28/2023   • DXA SCAN  11/22/2023   • TDAP/TD VACCINES (2 - Td or Tdap) 07/25/2027   • ZOSTER VACCINE  Addressed              Assessment & Plan   CMS Preventative Services Quick Reference  Risk Factors Identified During Encounter  Cardiovascular Disease  The above risks/problems have been discussed with the patient.  Follow up actions/plans if indicated are seen below in the Assessment/Plan Section.  Pertinent information has been shared with the patient in the After Visit Summary.    Diagnoses and all orders for this visit:    1. Medicare annual wellness visit, subsequent (Primary)  Done today  2. Hyperlipidemia LDL goal <100  -     Lipid Panel; Future  Continue repatha  3. Essential hypertension  -     CBC & Differential; Future  Continue Norvasc 5 mg po qd, Avapro 150 mg po qd, Bystolic 10 mg po qd, and Clonidine 0.1 mg po q 8  4. Coronary artery disease involving native coronary artery of native heart  without angina pectoris  Continue Repatha  5. Acquired hypothyroidism  -     levothyroxine (SYNTHROID, LEVOTHROID) 200 MCG tablet; Take 1 tablet by mouth Daily.  Dispense: 90 tablet; Refill: 1  -     TSH; Future  Continue synthroid 200 mcg po qd  6. Gastroesophageal reflux disease, unspecified whether esophagitis present  Continue Protonix 40 mg po qd  7. Abnormal CBC  -     Peripheral Blood Smear; Future    8. Skin cancer screening  -     Ambulatory Referral to Dermatology        Follow Up:   Return in about 4 months (around 12/29/2022).     An After Visit Summary and PPPS were made available to the patient.

## 2022-08-30 DIAGNOSIS — E03.9 HYPOTHYROIDISM (ACQUIRED): Primary | ICD-10-CM

## 2022-08-30 LAB
CHOLEST SERPL-MCNC: 204 MG/DL (ref 0–200)
HDLC SERPL-MCNC: 65 MG/DL (ref 40–60)
LDLC SERPL CALC-MCNC: 109 MG/DL (ref 0–100)
LDLC/HDLC SERPL: 1.61 {RATIO}
PATHOLOGY REVIEW: YES
TRIGL SERPL-MCNC: 173 MG/DL (ref 0–150)
TSH SERPL DL<=0.05 MIU/L-ACNC: 11.6 UIU/ML (ref 0.27–4.2)
VLDLC SERPL-MCNC: 30 MG/DL (ref 5–40)

## 2022-08-30 RX ORDER — LEVOTHYROXINE SODIUM 0.03 MG/1
25 TABLET ORAL DAILY
Qty: 90 TABLET | Refills: 1 | Status: SHIPPED | OUTPATIENT
Start: 2022-08-30 | End: 2022-08-31 | Stop reason: SDUPTHER

## 2022-08-31 ENCOUNTER — TELEPHONE (OUTPATIENT)
Dept: INTERNAL MEDICINE | Facility: CLINIC | Age: 82
End: 2022-08-31

## 2022-08-31 RX ORDER — LEVOTHYROXINE SODIUM 0.03 MG/1
25 TABLET ORAL DAILY
Qty: 90 TABLET | Refills: 1 | Status: SHIPPED | OUTPATIENT
Start: 2022-08-31

## 2022-08-31 NOTE — TELEPHONE ENCOUNTER
Spoke with patient regarding results and new medication.  Verbalized understanding.  Rx sent to [facility].

## 2022-08-31 NOTE — TELEPHONE ENCOUNTER
----- Message from Christelle Gamez DO sent at 8/30/2022 11:37 AM EDT -----  TSH abnormal.  Added synthroid 25 mcg to her already 200 mcg synthroid ( total will be 225 mcg daily). Daniele repeat tsh in 6 weeks.

## 2022-09-01 LAB
LAB AP CASE REPORT: NORMAL
PATH REPORT.FINAL DX SPEC: NORMAL
PATH REPORT.GROSS SPEC: NORMAL

## 2022-09-13 ENCOUNTER — OFFICE VISIT (OUTPATIENT)
Dept: NEUROLOGY | Facility: CLINIC | Age: 82
End: 2022-09-13

## 2022-09-13 VITALS
RESPIRATION RATE: 16 BRPM | OXYGEN SATURATION: 93 % | HEART RATE: 73 BPM | DIASTOLIC BLOOD PRESSURE: 80 MMHG | SYSTOLIC BLOOD PRESSURE: 136 MMHG

## 2022-09-13 DIAGNOSIS — G20 PARKINSON'S DISEASE: Primary | ICD-10-CM

## 2022-09-13 PROCEDURE — 99214 OFFICE O/P EST MOD 30 MIN: CPT | Performed by: PSYCHIATRY & NEUROLOGY

## 2022-09-13 NOTE — PROGRESS NOTES
Subjective:    CC: Nicolle Chavez is in clinic today for follow up for tremors and parkinsonism.  HPI:  Initial : 6/29/22: Patient is a 81-year-old female with past medical history of hypertension, hyperlipidemia, coronary artery disease referred to clinic for evaluation of tremors.  She reports that she started noticing tremors 6 to 8 months ago and it has slowly become slightly worse.  She reports that tremors are present in her left hand.  She reports that tremors are present when she is holding cup of coffee or glass of water, while using spoon or fork and is also present while resting.  She denies any head tremors.  There is no family history of tremors.  She denies any difficulty with walking.  No change in voice quality.  No episodes of acting out dreams at night.  She does report reduced ability to smell and taste in last few years.  No problems with handwriting.    Follow up: 9/13/2022: She is in clinic for regular follow-up.  Since her initial visit in June 2022, finally insurance did not approve DaTscan and it was completed in August.  It did show a pattern consistent with Parkinson's disease with reduced uptake on right more than left basal ganglia.    The following portions of the patient's history were reviewed and updated as of 09/13/2022: allergies, social history and problem list.       Current Outpatient Medications:   •  Aflibercept (Eylea) 2 MG/0.05ML solution, by Intravitreal route As Needed., Disp: , Rfl:   •  amLODIPine (NORVASC) 5 MG tablet, Take 1 tablet by mouth Daily., Disp: 90 tablet, Rfl: 3  •  aspirin 81 MG tablet, Take 1 tablet by mouth daily., Disp: , Rfl:   •  cinacalcet (SENSIPAR) 60 MG tablet, Take 1 tablet by mouth daily., Disp: , Rfl:   •  cloNIDine (Catapres) 0.1 MG tablet, Take 1 tablet by mouth Every 8 (Eight) Hours As Needed for High Blood Pressure (BP >180/100 mm Hg)., Disp: 10 tablet, Rfl: 5  •  denosumab (Prolia) 60 MG/ML solution prefilled syringe syringe, Inject  60 mg under the skin into the appropriate area as directed 1 (One) Time. 2 TIMES A YEAR, Disp: , Rfl:   •  irbesartan (AVAPRO) 150 MG tablet, TAKE 1 TABLET BY MOUTH EVERY DAY, Disp: 90 tablet, Rfl: 1  •  levothyroxine (Synthroid) 25 MCG tablet, Take 1 tablet by mouth Daily., Disp: 90 tablet, Rfl: 1  •  levothyroxine (SYNTHROID, LEVOTHROID) 200 MCG tablet, Take 1 tablet by mouth Daily., Disp: 90 tablet, Rfl: 1  •  nebivolol (BYSTOLIC) 10 MG tablet, TAKE 1 TABLET BY MOUTH EVERY DAY, Disp: 90 tablet, Rfl: 1  •  nitroglycerin (NITROSTAT) 0.4 MG SL tablet, Place 1 tablet under the tongue Every 5 (Five) Minutes As Needed for Chest Pain. Take no more than 3 doses in 15 minutes., Disp: 25 tablet, Rfl: 5  •  pantoprazole (PROTONIX) 40 MG EC tablet, Take 40 mg by mouth As Needed., Disp: , Rfl:   •  PREMARIN 0.625 MG/GM vaginal cream, Insert  into the vagina 1 (One) Time Per Week., Disp: , Rfl:   •  Repatha SureClick solution auto-injector SureClick injection, ADMINISTER 1 ML(140 MG) UNDER THE SKIN EVERY 2 WEEKS, Disp: 6 mL, Rfl: 3  •  carbidopa-levodopa (Sinemet)  MG per tablet, Take 1 tablet by mouth 3 (Three) Times a Day for 30 days., Disp: 90 tablet, Rfl: 3   Past Medical History:   Diagnosis Date   • Abdominal pain     Last Impression: 26 May 2015  Improving. The patient states she had a history of lower   abdominal pain after taking antibiotics for UTI, but this has improved.  Sanjiv Mak (Gastroenterology)   • Abnormal weight gain    • Anxiety 12/12/2016   • Arthritis of neck    • Cataract    • Cataract 2014   • Cataracts, bilateral    • CHF (congestive heart failure) (HCC)    • Congenital heart disease    • Coronary artery disease 12/12/2016    Field ST-elevation myocardial infarction, 11/25/2015, with associated chest pain, jaw pain, diaphoresis, and dyspnea; inferior-anterior ST elevation by outside telemetry strips.  Emergent cardiac catheterization, Dr. Isael Rosenbaum:  Status post drug-eluting stent  to the proximal right coronary artery using a 2.25 x 23 mm Xience Alpine stent.  Ejection fraction 55% to 60%.    • CTS (carpal tunnel syndrome)    • Dysphagia 08/15/2016    Last Impression: 26 May 2015  History of intermittent dysphagia symptoms. Differentials     include esophageal dysmotility, Schatzki's ring.  Sanjiv Mak (Gastroenterology)     • Fracture of wrist    • Gout     2012   • Gout 2012   • H/O bone density study    • Headache    • Headache    • High cholesterol     1995   • Hyperparathyroidism (HCC)     2009   • Hypertension    • Hypothyroidism    • Low back strain    • Myalgia and myositis 08/15/2016   • Myocardial infarction (HCC)    • Neck strain    • Palpitations 09/25/2017   • Parathyroid disorder ([facility]) 12/12/2016    with a history of a left parathyroid parathyroidectomy.    • Peptic ulcer disease     and bleeding ulcer approximately 12 years ago; incomplete database.    • Rotator cuff syndrome 01/2021   • Scoliosis    • Sleep apnea       Past Surgical History:   Procedure Laterality Date   • APPENDECTOMY     • BREAST BIOPSY Right 1995    Ultrasound guided needle biopsy   • CARDIAC CATHETERIZATION  06/2022   • CAROTID STENT     • COLONOSCOPY     • CORONARY STENT PLACEMENT     • EYE SURGERY Left    • HYSTERECTOMY Bilateral 1978   • OOPHORECTOMY     • OTHER SURGICAL HISTORY      Ulcer repair   • PARATHYROIDECTOMY Left    • WRIST SURGERY  2012      Family History   Problem Relation Age of Onset   • Anemia Mother    • Arthritis Mother    • Heart disease Mother    • Hypertension Mother    • Stroke Mother    • Broken bones Mother    • Osteoporosis Mother    • Heart attack Mother    • Cancer Father         stomach   • Breast cancer Neg Hx    • Ovarian cancer Neg Hx    • Colon cancer Neg Hx    • Endometrial cancer Neg Hx         Review of Systems  Objective:    /80   Pulse 73   Resp 16   SpO2 93%     Neurology Exam:    General apperance: NAD.  Subtle masked face noted    Mental status:  Alert, awake and oriented to time place and person.    Recent and Remote memory: Can recall 3/3 objects at 5 minutes. Can recall historical events.     Attention span and Concentration: Serial 7s: Normal.     Fund of knowledge:  Normal.     Language and Speech: No aphasia or dysarthria.    Naming , Repitition and Comprehension:  Can name objects, repeat a sentence and follow commands. Speech is clear and fluent with good repetition, comprehension, and naming.    Cranial Nerves:   CN II: Visual fields are full. Intact. Fundi - Normal, No papillederma, Pupils - CHITRA  CN III, IV and VI: Extraocular movements are intact. Normal saccades.   CN V: Facial sensation is intact.   CN VII: Muscles of facial expression reveal no asymmetry. Intact.   CN VIII: Hearing is intact. Whispered voice intact.   CN IX and X: Palate elevates symmetrically. Intact  CN XI: Shoulder shrug is intact.   CN XII: Tongue is midline without evidence of atrophy or fasciculation.     Motor:  Right UE muscle strength 5/5. Normal tone.     Left UE muscle strength 5/5.  Subtle clasp knife rigidity noted     Right LE muscle strength5/5. Normal tone.     Left LE muscle strength 5/5. Normal tone.      Sensory: Normal light touch, vibration and pinprick sensation bilaterally.    DTRs: 2+ bilaterally in upper and lower extremities.    Babinski: Negative bilaterally.    Co-ordination: Normal finger-to-nose, heel to shin B/L.    Resting tremors on the left.  Tremors did improve while doing finger-to-nose.  No postural tremors noted.  Months became worse while walking.    Rhomberg: Negative.    Gait: Normal walking stride.  Likely reduced arm swing on the left.  Tremors became worse while walking.  No problems in turning.    Cardiovascular: Regular rate and rhythm without murmur, gallop or rub.    Ophthalmoscopic exam: Normal fundi, no papilledema.    Assessment and Plan:  1. Parkinson's disease (HCC)  DaTscan confirmed Parkinson's disease.  I will start her  on Sinemet  mg tablet, half tablet 3 times daily for 3 weeks then 1 tablet 3 times daily after that.  I have advised her to take medication 30 minutes prior to meals for it to be more effective.  Based on the response, further dose adjustment will be made.  I will plan to see her back in clinic in 8 weeks for follow-up.       I spent 30 minutes in patient care: Reviewing records prior to the visit, entering orders and documentation and spent more than raza 50% of this time face-to-face in management, instructions and education regarding above mentioned diagnosis and also on counseling and discussing about taking medication regularly, possible side effects with medication use, importance of good sleep hygiene, good hydration and regular exercise.    Return in about 8 weeks (around 11/8/2022).

## 2022-09-15 ENCOUNTER — TELEPHONE (OUTPATIENT)
Dept: NEUROLOGY | Facility: CLINIC | Age: 82
End: 2022-09-15

## 2022-09-15 NOTE — TELEPHONE ENCOUNTER
Caller: ChavezNicolle    Relationship: Self    Best call back number:[phone]    Caller requesting test results: PATIENT    What test was performed: CADY SCAN TEST    When was the test performed: N/A    Where was the test performed:  [facility]    Additional notes: PATIENT STATES COPIES OF CADY SCAN RESULTS WERE SUPPOSED TO BE MADE & GIVEN TO HER ON LAST VISIT, HOWEVER, THE CLINIC GOT BUSY & SHE NEVER RECEIVED THE COPIES. SHE WOULD LIKE THE SCAN TEST RESULTS MAILED TO HER HOME [address] [place] [id number].    PLEASE CALL WITH QUESTIONS OR CONCERNS    THANK YOU

## 2022-09-21 RX ORDER — EVOLOCUMAB 140 MG/ML
INJECTION, SOLUTION SUBCUTANEOUS
Qty: 6 ML | Refills: 3 | Status: SHIPPED | OUTPATIENT
Start: 2022-09-21

## 2022-11-07 ENCOUNTER — OFFICE VISIT (OUTPATIENT)
Dept: NEUROLOGY | Facility: CLINIC | Age: 82
End: 2022-11-07

## 2022-11-07 VITALS
BODY MASS INDEX: 23.94 KG/M2 | WEIGHT: 130.07 LBS | OXYGEN SATURATION: 99 % | DIASTOLIC BLOOD PRESSURE: 64 MMHG | HEART RATE: 68 BPM | SYSTOLIC BLOOD PRESSURE: 120 MMHG | HEIGHT: 62 IN

## 2022-11-07 DIAGNOSIS — G20 PARKINSON'S DISEASE: Primary | ICD-10-CM

## 2022-11-07 PROCEDURE — 99214 OFFICE O/P EST MOD 30 MIN: CPT | Performed by: PSYCHIATRY & NEUROLOGY

## 2022-11-07 RX ORDER — NEBIVOLOL 10 MG/1
TABLET ORAL
Qty: 90 TABLET | Refills: 1 | Status: SHIPPED | OUTPATIENT
Start: 2022-11-07

## 2022-11-07 NOTE — PROGRESS NOTES
Subjective:    CC: Nicolle Chavez is in clinic today for follow up for Parkinson's disease.    HPI:  Initial : 6/29/22: Patient is a 81-year-old female with past medical history of hypertension, hyperlipidemia, coronary artery disease referred to clinic for evaluation of tremors.  She reports that she started noticing tremors 6 to 8 months ago and it has slowly become slightly worse.  She reports that tremors are present in her left hand.  She reports that tremors are present when she is holding cup of coffee or glass of water, while using spoon or fork and is also present while resting.  She denies any head tremors.  There is no family history of tremors.  She denies any difficulty with walking.  No change in voice quality.  No episodes of acting out dreams at night.  She does report reduced ability to smell and taste in last few years.  No problems with handwriting.    Follow up: 9/13/2022: She is in clinic for regular follow-up.  Since her initial visit in June 2022, finally insurance did not approve DaTscan and it was completed in August.  It did show a pattern consistent with Parkinson's disease with reduced uptake on right more than left basal ganglia.\    Follow-up: 11/7/2022: She is in clinic for regular follow-up.  Since her last visit in September 2022, she has been on Sinemet  mg tablet, 1 tablet 3 times a day for 3 weeks ago.  Initially, she did feel nauseous but its improved significantly now.  She reports no change in her tremors.  She reports that she has not noticed any change as far as the symptoms are concerned.    The following portions of the patient's history were reviewed and updated as of 11/07/2022: allergies, social history and problem list.       Current Outpatient Medications:   •  Aflibercept (Eylea) 2 MG/0.05ML solution, by Intravitreal route As Needed., Disp: , Rfl:   •  amLODIPine (NORVASC) 5 MG tablet, Take 1 tablet by mouth Daily., Disp: 90 tablet, Rfl: 3  •  aspirin 81  MG tablet, Take 1 tablet by mouth daily., Disp: , Rfl:   •  carbidopa-levodopa (Sinemet)  MG per tablet, Take 1.5 tablets by mouth 3 (Three) Times a Day for 30 days., Disp: 135 tablet, Rfl: 3  •  cinacalcet (SENSIPAR) 60 MG tablet, Take 1 tablet by mouth daily., Disp: , Rfl:   •  cloNIDine (Catapres) 0.1 MG tablet, Take 1 tablet by mouth Every 8 (Eight) Hours As Needed for High Blood Pressure (BP >180/100 mm Hg)., Disp: 10 tablet, Rfl: 5  •  denosumab (Prolia) 60 MG/ML solution prefilled syringe syringe, Inject 60 mg under the skin into the appropriate area as directed 1 (One) Time. 2 TIMES A YEAR, Disp: , Rfl:   •  irbesartan (AVAPRO) 150 MG tablet, TAKE 1 TABLET BY MOUTH EVERY DAY, Disp: 90 tablet, Rfl: 1  •  levothyroxine (Synthroid) 25 MCG tablet, Take 1 tablet by mouth Daily., Disp: 90 tablet, Rfl: 1  •  levothyroxine (SYNTHROID, LEVOTHROID) 200 MCG tablet, Take 1 tablet by mouth Daily., Disp: 90 tablet, Rfl: 1  •  nebivolol (BYSTOLIC) 10 MG tablet, TAKE 1 TABLET BY MOUTH EVERY DAY, Disp: 90 tablet, Rfl: 1  •  nitroglycerin (NITROSTAT) 0.4 MG SL tablet, Place 1 tablet under the tongue Every 5 (Five) Minutes As Needed for Chest Pain. Take no more than 3 doses in 15 minutes., Disp: 25 tablet, Rfl: 5  •  pantoprazole (PROTONIX) 40 MG EC tablet, Take 40 mg by mouth As Needed., Disp: , Rfl:   •  PREMARIN 0.625 MG/GM vaginal cream, Insert  into the vagina 1 (One) Time Per Week., Disp: , Rfl:   •  Repatha SureClick solution auto-injector SureClick injection, ADMINISTER 1 ML(140 MG) UNDER THE SKIN EVERY 2 WEEKS, Disp: 6 mL, Rfl: 3   Past Medical History:   Diagnosis Date   • Abdominal pain     Last Impression: 26 May 2015  Improving. The patient states she had a history of lower   abdominal pain after taking antibiotics for UTI, but this has improved.  Sanjiv Mak (Gastroenterology)   • Abnormal weight gain    • Anxiety 12/12/2016   • Arthritis of neck    • Cataract    • Cataract 2014   • Cataracts,  bilateral    • CHF (congestive heart failure) ([facility])    • Congenital heart disease    • Coronary artery disease 12/12/2016    Field ST-elevation myocardial infarction, 11/25/2015, with associated chest pain, jaw pain, diaphoresis, and dyspnea; inferior-anterior ST elevation by outside telemetry strips.  Emergent cardiac catheterization, Dr. Isael Rosenbaum:  Status post drug-eluting stent to the proximal right coronary artery using a 2.25 x 23 mm Xience Alpine stent.  Ejection fraction 55% to 60%.    • CTS (carpal tunnel syndrome)    • Dysphagia 08/15/2016    Last Impression: 26 May 2015  History of intermittent dysphagia symptoms. Differentials     include esophageal dysmotility, Schatzki's ring.  Sanjiv Mak (Gastroenterology)     • Fracture of wrist    • Gout     2012   • Gout 2012   • H/O bone density study    • Headache    • Headache    • High cholesterol     1995   • Hyperparathyroidism ([facility])     2009   • Hypertension    • Hypothyroidism    • Low back strain    • Myalgia and myositis 08/15/2016   • Myocardial infarction ([facility])    • Neck strain    • Palpitations 09/25/2017   • Parathyroid disorder ([facility]) 12/12/2016    with a history of a left parathyroid parathyroidectomy.    • Peptic ulcer disease     and bleeding ulcer approximately 12 years ago; incomplete database.    • Rotator cuff syndrome 01/2021   • Scoliosis    • Sleep apnea       Past Surgical History:   Procedure Laterality Date   • APPENDECTOMY     • BREAST BIOPSY Right 1995    Ultrasound guided needle biopsy   • CARDIAC CATHETERIZATION  06/2022   • CAROTID STENT     • COLONOSCOPY     • CORONARY STENT PLACEMENT     • EYE SURGERY Left    • HYSTERECTOMY Bilateral 1978   • OOPHORECTOMY     • OTHER SURGICAL HISTORY      Ulcer repair   • PARATHYROIDECTOMY Left    • WRIST SURGERY  2012      Family History   Problem Relation Age of Onset   • Anemia Mother    • Arthritis Mother    • Heart disease Mother    • Hypertension Mother    • Stroke Mother    •  "Broken bones Mother    • Osteoporosis Mother    • Heart attack Mother    • Cancer Father         stomach   • Breast cancer Neg Hx    • Ovarian cancer Neg Hx    • Colon cancer Neg Hx    • Endometrial cancer Neg Hx         Review of Systems  Objective:    /64   Pulse 68   Ht 157.5 cm (62.01\")   Wt 59 kg (130 lb 1.1 oz)   SpO2 99%   BMI 23.78 kg/m²     Neurology Exam:  General apperance: NAD.     Mental status: Alert, awake and oriented to time place and person.    Recent and Remote memory: Can recall 3/3 objects at 5 minutes. Can recall historical events.     Attention span and Concentration: Serial 7s: Normal.     Fund of knowledge:  Normal.     Language and Speech: No aphasia or dysarthria.    Naming , Repitition and Comprehension:  Can name objects, repeat a sentence and follow commands. Speech is clear and fluent with good repetition, comprehension, and naming.    CN II to XII: Intact.    Opthalmoscopic Exam: No papilledema.    Motor:  Right UE muscle strength 5/5. Normal tone.     Left UE muscle strength 5/5. Normal tone.      Right LE muscle strength5/5. Normal tone.     Left LE muscle strength 5/5. Normal tone.      Sensory: Normal light touch, vibration and pinprick sensation bilaterally.    DTRs: 2+ bilaterally.  Mild resting tremors noted involving left hand.    Babinski: Negative bilaterally.    Co-ordination: Normal finger-to-nose, heel to shin B/L.    Rhomberg: Negative.    Gait: Normal walking speed, normal walking stride.  Tremors noted in left hand while walking.  Normal turning.    Cardiovascular: Regular rate and rhythm without murmur, gallop or rub.    Assessment and Plan:  1. Parkinson's disease (HCC)  -With Sinemet  mg tablet, 1 tablet 3 times daily, she has noticed not much difference in her symptoms.  She however reports that she has been under tremendous stress because of her 's health and has been caring for her in the hospital and rehab for last 3 to 4 weeks.  I am " going to continue with Sinemet  mg tablet, 1 tablet 3 times daily for next 4 to 5 weeks and then have advised her to increase it to 1.5  tablet 3 times a day for better therapeutic effect.  I will plan to see her back in clinic in 3 months for follow-up and at that time based on the response to Sinemet, further dose adjustment will be made.       I spent 30 minutes in patient care: Reviewing records prior to the visit, entering orders and documentation and spent more than raza 50% of this time face-to-face in management, instructions and education regarding above mentioned diagnosis and also on counseling and discussing about taking medication regularly, possible side effects with medication use, importance of good sleep hygiene, good hydration and regular exercise.    Return in about 3 months (around 2/7/2023).

## 2022-11-14 RX ORDER — AMLODIPINE BESYLATE 5 MG/1
TABLET ORAL
Qty: 90 TABLET | Refills: 0 | Status: SHIPPED | OUTPATIENT
Start: 2022-11-14

## 2022-11-18 RX ORDER — AMLODIPINE BESYLATE 5 MG/1
TABLET ORAL
Qty: 90 TABLET | Refills: 0 | OUTPATIENT
Start: 2022-11-18

## 2022-12-07 ENCOUNTER — APPOINTMENT (OUTPATIENT)
Dept: ULTRASOUND IMAGING | Facility: HOSPITAL | Age: 82
End: 2022-12-07

## 2022-12-07 ENCOUNTER — APPOINTMENT (OUTPATIENT)
Dept: GENERAL RADIOLOGY | Facility: HOSPITAL | Age: 82
End: 2022-12-07

## 2022-12-07 ENCOUNTER — HOSPITAL ENCOUNTER (EMERGENCY)
Facility: HOSPITAL | Age: 82
Discharge: HOME | End: 2022-12-07
Attending: EMERGENCY MEDICINE | Admitting: EMERGENCY MEDICINE

## 2022-12-07 VITALS
DIASTOLIC BLOOD PRESSURE: 67 MMHG | SYSTOLIC BLOOD PRESSURE: 120 MMHG | TEMPERATURE: 97.9 F | WEIGHT: 114.8 LBS | RESPIRATION RATE: 16 BRPM | OXYGEN SATURATION: 97 % | HEIGHT: 61 IN | HEART RATE: 60 BPM | BODY MASS INDEX: 21.67 KG/M2

## 2022-12-07 DIAGNOSIS — M25.561 RIGHT KNEE PAIN, UNSPECIFIED CHRONICITY: ICD-10-CM

## 2022-12-07 DIAGNOSIS — M79.661 PAIN IN RIGHT LOWER LEG: Primary | ICD-10-CM

## 2022-12-07 PROCEDURE — 73590 X-RAY EXAM OF LOWER LEG: CPT

## 2022-12-07 PROCEDURE — 99283 EMERGENCY DEPT VISIT LOW MDM: CPT

## 2022-12-07 PROCEDURE — 93971 EXTREMITY STUDY: CPT

## 2022-12-07 PROCEDURE — 73562 X-RAY EXAM OF KNEE 3: CPT

## 2022-12-07 RX ADMIN — DICLOFENAC 4 G: 10 GEL TOPICAL at 12:58

## 2022-12-07 NOTE — DISCHARGE INSTRUCTIONS
There could be some underlying tendon or ligament injury or strain contributing to your pain.  You can use knee brace as needed for comfort.  Rest, ice and elevate to help with pain and swelling.  You can use Voltaren gel and other over-the-counter medications to help with pain.  Try to follow-up with your primary care provider.  May follow-up with our orthopedic surgeon Dr. Veliz if pain persists or worsens.  They may need to do additional imaging or work-up.  Return to the ER for any change, worsening symptoms, or any additional concerns including but not limited today severe redness, swelling, worsening pain, other color changes to the extremity.

## 2022-12-07 NOTE — ED PROVIDER NOTES
Subjective   History of Present Illness  Patient is an 82-year-old female with a history of anxiety, cataracts, CHF, CAD, gout, hyperparathyroidism, high cholesterol, hypertension, peptic ulcer disease and sleep apnea presenting to the ER for evaluation of leg pain.  Patient states she fell approximately 3 weeks ago.  She states she had been able to bear weight and had no issues up until ago.  She states that she did therapy last Wednesday and Friday.  She states that since then she has had pain behind her knee that radiates down the front of her leg.  She states it has kept her from being able to walk up and down her stairs at home.  She states the pain is sharp in nature.  She denies any obvious redness, swelling of the extremity.  She denies any other injuries or trauma.  She does not take anticoagulation.        Review of Systems   Constitutional: Negative for chills and fever.   HENT: Negative.    Eyes: Negative.    Respiratory: Negative.    Cardiovascular: Negative.    Gastrointestinal: Negative.    Genitourinary: Negative.    Musculoskeletal: Positive for arthralgias and myalgias.   Skin: Negative.    Allergic/Immunologic: Negative for immunocompromised state.   Neurological: Negative.    Psychiatric/Behavioral: Negative.        Past Medical History:   Diagnosis Date   • Abdominal pain     Last Impression: 26 May 2015  Improving. The patient states she had a history of lower   abdominal pain after taking antibiotics for UTI, but this has improved.  Sanjiv Mak (Gastroenterology)   • Abnormal weight gain    • Anxiety 12/12/2016   • Arthritis of neck    • Cataract    • Cataract 2014   • Cataracts, bilateral    • CHF (congestive heart failure) ([facility])    • Congenital heart disease    • Coronary artery disease 12/12/2016    Field ST-elevation myocardial infarction, 11/25/2015, with associated chest pain, jaw pain, diaphoresis, and dyspnea; inferior-anterior ST elevation by outside telemetry strips.  Emergent  cardiac catheterization, Dr. Isael Rosenbaum:  Status post drug-eluting stent to the proximal right coronary artery using a 2.25 x 23 mm Xience Alpine stent.  Ejection fraction 55% to 60%.    • CTS (carpal tunnel syndrome)    • Dysphagia 08/15/2016    Last Impression: 26 May 2015  History of intermittent dysphagia symptoms. Differentials     include esophageal dysmotility, Schatzki's ring.  Sanjiv Mak (Gastroenterology)     • Fracture of wrist    • Gout     2012   • Gout 2012   • H/O bone density study    • Headache    • Headache    • High cholesterol     1995   • Hyperparathyroidism (HCC)     2009   • Hypertension    • Hypothyroidism    • Low back strain    • Myalgia and myositis 08/15/2016   • Myocardial infarction ([facility])    • Neck strain    • Palpitations 09/25/2017   • Parathyroid disorder ([facility]) 12/12/2016    with a history of a left parathyroid parathyroidectomy.    • Peptic ulcer disease     and bleeding ulcer approximately 12 years ago; incomplete database.    • Rotator cuff syndrome 01/2021   • Scoliosis    • Sleep apnea        Allergies   Allergen Reactions   • Pravachol [Pravastatin Sodium] Myalgia   • Statins Myalgia   • Zocor [Simvastatin] Myalgia   • Aspirin Rash   • Bactrim [Sulfamethoxazole-Trimethoprim] Rash   • Crestor [Rosuvastatin Calcium] Myalgia   • Lipitor [Atorvastatin] Myalgia       Past Surgical History:   Procedure Laterality Date   • APPENDECTOMY     • BREAST BIOPSY Right 1995    Ultrasound guided needle biopsy   • CARDIAC CATHETERIZATION  06/2022   • CAROTID STENT     • COLONOSCOPY     • CORONARY STENT PLACEMENT     • EYE SURGERY Left    • HYSTERECTOMY Bilateral 1978   • OOPHORECTOMY     • OTHER SURGICAL HISTORY      Ulcer repair   • PARATHYROIDECTOMY Left    • WRIST SURGERY  2012       Family History   Problem Relation Age of Onset   • Anemia Mother    • Arthritis Mother    • Heart disease Mother    • Hypertension Mother    • Stroke Mother    • Broken bones Mother    •  "Osteoporosis Mother    • Heart attack Mother    • Cancer Father         stomach   • Breast cancer Neg Hx    • Ovarian cancer Neg Hx    • Colon cancer Neg Hx    • Endometrial cancer Neg Hx        Social History     Socioeconomic History   • Marital status:    Tobacco Use   • Smoking status: Never   • Smokeless tobacco: Never   Vaping Use   • Vaping Use: Never used   Substance and Sexual Activity   • Alcohol use: No   • Drug use: No   • Sexual activity: Not Currently     Partners: Male           Objective   Physical Exam  Vitals and nursing note reviewed.     /67   Pulse 60   Temp 97.9 °F (36.6 °C) (Oral)   Resp 16   Ht 154.9 cm (61\")   Wt 52.1 kg (114 lb 12.8 oz)   SpO2 97%   BMI 21.69 kg/m²     GEN: No acute distress, sitting upright in his chair.  She is awake and alert.  She does not appear septic or toxic.  Head: Normocephalic, atraumatic  Skin: Patient has some yellow-colored bruising to the distal anterior right lower extremity, no other erythema, ecchymoses   Eyes: EOM intact  ENT:  Mask in place per protocol  Chest: Nontender to palpation  Cardiovascular: Regular rate and rhythm  Lungs: Breathing even and nonlabored, clear to auscultation bilaterally  Extremities: Patient has some tenderness behind the right knee, mild posterior calf tenderness to palpation.  No tenderness over the knee or foot of her right lower extremity.  Left lower extremity unremarkable.  Dorsalis pedis pulse 2+  Neuro: GCS 15  Psych: Mood and affect are appropriate    Procedures           ED Course  ED Course as of 12/07/22 1836   Wed Dec 07, 2022   1145 Narrative & Impression  PROCEDURE: US VENOUS DOPPLER LOWER EXTREMITY RIGHT (DUPLEX)-     HISTORY: 3 weeks s/p fall, Posterior knee pain radiating down calf  .  FINDINGS: Sonographic evaluation of the right lower extremity deep  venous system was performed. Compression sonography, color Doppler and  duplex Doppler was performed. There is no evidence of " intraluminal  thrombus to suggest deep venous thrombosis from the level of the groin  to the proximal calf.     IMPRESSION:  No evidence of deep venous thrombosis.              This report was signed and finalized on 12/7/2022 11:38 AM by Freddie Colon MD. [LA]   1240 PROCEDURE: XR TIBIA FIBULA 2 VW RIGHT-     2 VIEW     HISTORY: Fall, right lower extremity pain, old ecchymosis to anterior  right shin , prior     FINDINGS:  Two views show no evidence of an acute, displaced fracture or  dislocation of the visualized bony architecture.  The joint spaces  appear normal.     IMPRESSION:  Unremarkable exam.           This report was signed and finalized on 12/7/2022 12:30 PM by Shashi Roa MD. [LA]   1241 PROCEDURE: XR KNEE 3 VW RIGHT-     THREE VIEW     HISTORY: Fall 3 weeks ago, right knee pain     FINDINGS:  Three views show no evidence of an acute, displaced fracture  or dislocation of the visualized bony architecture.  The joint spaces  appear normal. Small calcified joint bodies are noted within the central  joint space.     IMPRESSION:  1. No acute fracture.  2. Small chronic appearing calcified joint bodies.           This result has not been signed. Information might be incomplete. [LA]   1245 Discussed findings with the patient.  She declined pain medicine.  Will give Voltaren gel, knee brace, discussed other symptomatic treatment, follow-up and strict return precautions [LA]      ED Course User Index  [LA] Diandra Alexander PA-C                                           MDM  Number of Diagnoses or Management Options  Pain in right lower leg  Right knee pain, unspecified chronicity  Diagnosis management comments: On arrival, patient is differential could include tendon or ligament injury, muscle strain or spasm, fracture, DVT, and other concerns.  Will obtain x-ray of the tib-fib and knee as well as venous duplex.    X-rays were read by the radiologist with no acute findings.  Venous duplex without signs  of DVT per radiology.  Offered pain medicine but patient declined.  Will give Voltaren gel, knee brace and orthopedic surgery follow-up.  Discussed other symptomatic treatment and strict return precautions.  She verbalized understanding and was in agreement with this plan of care.       Amount and/or Complexity of Data Reviewed  Tests in the radiology section of CPT®: reviewed and ordered  Discussion of test results with the performing providers: yes  Review and summarize past medical records: yes  Discuss the patient with other providers: yes    Risk of Complications, Morbidity, and/or Mortality  Presenting problems: low  Diagnostic procedures: low  Management options: low    Patient Progress  Patient progress: stable      Final diagnoses:   Pain in right lower leg   Right knee pain, unspecified chronicity       ED Disposition  ED Disposition     ED Disposition   Discharge    Condition   Stable    Comment   --             Christelle Gamez,   [address] [facility] [id number] [phone]    Schedule an appointment as soon as possible for a visit       Delroy Veliz MD  [address] [id number] [phone]      As needed, If symptoms worsen         Medication List      No changes were made to your prescriptions during this visit.          Diandra Alexander PA-C  12/07/22 [id number]

## 2023-01-24 ENCOUNTER — OFFICE VISIT (OUTPATIENT)
Dept: CARDIOLOGY | Facility: CLINIC | Age: 83
End: 2023-01-24
Payer: MEDICARE

## 2023-01-24 VITALS
OXYGEN SATURATION: 96 % | BODY MASS INDEX: 20.98 KG/M2 | HEART RATE: 60 BPM | DIASTOLIC BLOOD PRESSURE: 68 MMHG | SYSTOLIC BLOOD PRESSURE: 126 MMHG | WEIGHT: 114 LBS | HEIGHT: 62 IN

## 2023-01-24 DIAGNOSIS — I10 ESSENTIAL HYPERTENSION: ICD-10-CM

## 2023-01-24 DIAGNOSIS — E78.5 HYPERLIPIDEMIA LDL GOAL <100: ICD-10-CM

## 2023-01-24 DIAGNOSIS — I25.10 CORONARY ARTERY DISEASE INVOLVING NATIVE CORONARY ARTERY OF NATIVE HEART WITHOUT ANGINA PECTORIS: Primary | ICD-10-CM

## 2023-01-24 PROCEDURE — 99214 OFFICE O/P EST MOD 30 MIN: CPT | Performed by: INTERNAL MEDICINE

## 2023-01-24 NOTE — PROGRESS NOTES
[facility]  Office visit  Nicolle Chavez  1940  [phone]    VISIT DATE:  1/24/2023      PCP: Caren Recinos MD  [address] [facility] [address]    CC:  Chief Complaint   Patient presents with   • Coronary artery disease involving native coronary artery of       PROBLEM LIST:  1. Coronary artery disease:  a. Field ST-elevation myocardial infarction, 11/25/2015, with associated chest pain, jaw pain, diaphoresis, and dyspnea; inferior-anterior ST elevation by outside telemetry strips.   b. Emergent cardiac catheterization, Dr. Isael Rosenbaum: Status post drug-eluting stent to the proximal right coronary artery using a 2.25 x 23 mm Xience Alpine stent.   c. Ejection fraction 55% to 60%.   2. Uncontrolled Essential HTN.  3. Dyspnea.   4. Mixed Dyslipidemia.   5. Hypothyroidism, on chronic supplementation.   6. 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   7. Lower extremity edema.   8. History of peptic ulcer disease and bleeding ulcer approximately 12 years ago; incomplete database.   9. Family history of coronary disease.   10. Anxiety.   11. Gout.   12. Surgical history:  a. Hysterectomy.   b. Left parathyroidectomy.  c. Ulcer repair.       Previous cardiac studies and procedures:  October 2019 2-week ambulatory ECG monitor:  · Rare PACs which are intermittently symptomatic. Episodes of nonsustained atrial tachycardia.    ASSESSMENT:   Diagnosis Plan   1. Coronary artery disease involving native coronary artery of native heart without angina pectoris        2. Essential hypertension        3. Hyperlipidemia LDL goal <100            PLAN:  Coronary artery disease: Stable.  Continue current medical therapy.  Decreased low-dose aspirin to every other day dosing due to her potential concerns for developing gastric ulcer or gastritis.    Hyperlipidemia: Significant improvement in overall cholesterol profile after initiation of PCSK9  "inhibitor.  Continue current medical therapy.      Hypertension: Goal less than 130/80 mmHg. Did not tolerate thiazide diuretics.  Continue amlodipine 5 mg, nebivolol 10 mg every morning, continue clonidine 0.1 mg p.o. as needed for blood pressures greater than 180/100 mmHg.  Continue irbesartan 150 mg p.o. nightly..  Continue to keep home blood pressure log.    Symptomatic PACs: Currently well controlled.  Continue beta-blockade.  Known episodes of asymptomatic nonsustained atrial tachycardia.    Subjective  Denies palpitations.  Mechanical fall in late November early December.  He has been reporting some swelling in the right leg since that time.  Imaging negative for fracture.  Ultrasound negative for DVT.  Denies chest pain or dyspnea.  Blood pressures running less than 130/80 mmHg.    PHYSICAL EXAMINATION:  Vitals:    01/24/23 1325   BP: 126/68   BP Location: Right arm   Patient Position: Sitting   Cuff Size: Adult   Pulse: 60   SpO2: 96%   Weight: 51.7 kg (114 lb)   Height: 157.5 cm (62\")     General Appearance:    Alert, cooperative, no distress, appears stated age   Head:    Normocephalic, without obvious abnormality, atraumatic   Eyes:    conjunctiva/corneas clear   Nose:   Nares normal, septum midline, mucosa normal, no drainage   Throat:   Lips, teeth and gums normal   Neck:   Supple, symmetrical, trachea midline, no carotid    bruit or JVD   Lungs:     Clear to auscultation bilaterally, respirations unlabored   Chest Wall:    No tenderness or deformity    Heart:    Regular rate and rhythm, S1 and S2 normal, no murmur, rub   or gallop, normal carotid impulse bilaterally without bruit.   Abdomen:     Soft, non-tender   Extremities:   Extremities normal, atraumatic, no cyanosis or edema   Pulses:   2+ and symmetric all extremities   Skin:   Skin color, texture, turgor normal, no rashes or lesions       Diagnostic Data:  Procedures  Lab Results   Component Value Date    CHLPL 181 10/14/2020    TRIG 173 (H) " 08/29/2022    HDL 65 (H) 08/29/2022     Lab Results   Component Value Date    GLUCOSE 88 04/28/2022    BUN 12 06/03/2022    CREATININE 0.85 06/03/2022     06/03/2022    K 4.5 06/03/2022     06/03/2022    CO2 26 06/03/2022     Lab Results   Component Value Date    HGBA1C 5.2 01/21/2016     Lab Results   Component Value Date    WBC 3.88 08/29/2022    HGB 13.9 08/29/2022    HCT 40.1 08/29/2022     08/29/2022       Allergies  Allergies   Allergen Reactions   • Pravachol [Pravastatin Sodium] Myalgia   • Statins Myalgia   • Zocor [Simvastatin] Myalgia   • Aspirin Rash   • Bactrim [Sulfamethoxazole-Trimethoprim] Rash   • Crestor [Rosuvastatin Calcium] Myalgia   • Lipitor [Atorvastatin] Myalgia       Current Medications    Current Outpatient Medications:   •  Aflibercept (Eylea) 2 MG/0.05ML solution, by Intravitreal route As Needed., Disp: , Rfl:   •  amLODIPine (NORVASC) 5 MG tablet, TAKE 1 TABLET BY MOUTH EVERY DAY, Disp: 90 tablet, Rfl: 0  •  aspirin 81 MG tablet, Take 1 tablet by mouth daily., Disp: , Rfl:   •  carbidopa-levodopa (Sinemet)  MG per tablet, Take 1.5 tablets by mouth 3 (Three) Times a Day for 30 days., Disp: 135 tablet, Rfl: 3  •  cinacalcet (SENSIPAR) 60 MG tablet, Take 1 tablet by mouth daily., Disp: , Rfl:   •  cloNIDine (Catapres) 0.1 MG tablet, Take 1 tablet by mouth Every 8 (Eight) Hours As Needed for High Blood Pressure (BP >180/100 mm Hg)., Disp: 10 tablet, Rfl: 5  •  denosumab (Prolia) 60 MG/ML solution prefilled syringe syringe, Inject 60 mg under the skin into the appropriate area as directed 1 (One) Time. 2 TIMES A YEAR, Disp: , Rfl:   •  irbesartan (AVAPRO) 150 MG tablet, TAKE 1 TABLET BY MOUTH EVERY DAY, Disp: 90 tablet, Rfl: 1  •  levothyroxine (Synthroid) 25 MCG tablet, Take 1 tablet by mouth Daily., Disp: 90 tablet, Rfl: 1  •  nebivolol (BYSTOLIC) 10 MG tablet, TAKE 1 TABLET BY MOUTH EVERY DAY, Disp: 90 tablet, Rfl: 1  •  nitroglycerin (NITROSTAT) 0.4 MG SL tablet,  Place 1 tablet under the tongue Every 5 (Five) Minutes As Needed for Chest Pain. Take no more than 3 doses in 15 minutes., Disp: 25 tablet, Rfl: 5  •  PREMARIN 0.625 MG/GM vaginal cream, Insert  into the vagina 1 (One) Time Per Week., Disp: , Rfl:   •  Repatha SureClick solution auto-injector SureClick injection, ADMINISTER 1 ML(140 MG) UNDER THE SKIN EVERY 2 WEEKS, Disp: 6 mL, Rfl: 3  •  pantoprazole (PROTONIX) 40 MG EC tablet, Take 40 mg by mouth As Needed. Not currently taking due to stomach issues., Disp: , Rfl:           ROS  Review of Systems   Cardiovascular: Positive for chest pain, dyspnea on exertion, leg swelling and palpitations.   Respiratory: Positive for shortness of breath and snoring. Negative for cough and sputum production.        SOCIAL HX  Social History     Socioeconomic History   • Marital status:    Tobacco Use   • Smoking status: Never   • Smokeless tobacco: Never   Vaping Use   • Vaping Use: Never used   Substance and Sexual Activity   • Alcohol use: No   • Drug use: No   • Sexual activity: Not Currently     Partners: Male       FAMILY HX  Family History   Problem Relation Age of Onset   • Anemia Mother    • Arthritis Mother    • Heart disease Mother    • Hypertension Mother    • Stroke Mother    • Broken bones Mother    • Osteoporosis Mother    • Heart attack Mother    • Cancer Father         stomach   • Breast cancer Neg Hx    • Ovarian cancer Neg Hx    • Colon cancer Neg Hx    • Endometrial cancer Neg Hx              Rick Qiu III, MD, [facility]

## 2023-02-13 ENCOUNTER — OFFICE VISIT (OUTPATIENT)
Dept: NEUROLOGY | Facility: CLINIC | Age: 83
End: 2023-02-13
Payer: MEDICARE

## 2023-02-13 VITALS
HEIGHT: 62 IN | HEART RATE: 68 BPM | DIASTOLIC BLOOD PRESSURE: 70 MMHG | SYSTOLIC BLOOD PRESSURE: 124 MMHG | WEIGHT: 114 LBS | BODY MASS INDEX: 20.98 KG/M2 | OXYGEN SATURATION: 98 %

## 2023-02-13 DIAGNOSIS — G20 PARKINSON'S DISEASE: Primary | ICD-10-CM

## 2023-02-13 PROCEDURE — 99214 OFFICE O/P EST MOD 30 MIN: CPT | Performed by: PSYCHIATRY & NEUROLOGY

## 2023-02-13 RX ORDER — AZILSARTAN KAMEDOXOMIL 40 MG/1
TABLET ORAL
COMMUNITY

## 2023-02-13 RX ORDER — CHLORTHALIDONE 25 MG/1
TABLET ORAL
COMMUNITY

## 2023-02-13 RX ORDER — HYDROCHLOROTHIAZIDE 12.5 MG/1
CAPSULE, GELATIN COATED ORAL
COMMUNITY

## 2023-02-13 NOTE — PROGRESS NOTES
Subjective:    CC: Nicolle Chavez is in clinic today for follow up for history of Parkinson's.    HPI:  Initial : 6/29/22: Patient is a 81-year-old female with past medical history of hypertension, hyperlipidemia, coronary artery disease referred to clinic for evaluation of tremors.  She reports that she started noticing tremors 6 to 8 months ago and it has slowly become slightly worse.  She reports that tremors are present in her left hand.  She reports that tremors are present when she is holding cup of coffee or glass of water, while using spoon or fork and is also present while resting.  She denies any head tremors.  There is no family history of tremors.  She denies any difficulty with walking.  No change in voice quality.  No episodes of acting out dreams at night.  She does report reduced ability to smell and taste in last few years.  No problems with handwriting.    Follow up: 9/13/2022: She is in clinic for regular follow-up.  Since her initial visit in June 2022, finally insurance did not approve DaTscan and it was completed in August.  It did show a pattern consistent with Parkinson's disease with reduced uptake on right more than left basal ganglia.\    Follow-up: 11/7/2022: She is in clinic for regular follow-up.  Since her last visit in September 2022, she has been on Sinemet  mg tablet, 1 tablet 3 times a day for 3 weeks ago.  Initially, she did feel nauseous but its improved significantly now.  She reports no change in her tremors.  She reports that she has not noticed any change as far as the symptoms are concerned.    Follow-up: 2/13/2023: She is in clinic for regular follow-up.  Since her last visit in November 2022, she reports that overall tremor frequency and intensity has remained same even after increasing Sinemet dose to 1 and half tablet 3 times a day.  She denies any side effects with Sinemet use for the most part but some days, she does get nausea.  She is currently taking  last dose after gap of 6 to 7 hours.    The following portions of the patient's history were reviewed and updated as of 02/13/2023: allergies, social history and problem list.       Current Outpatient Medications:   •  Aflibercept (Eylea) 2 MG/0.05ML solution, by Intravitreal route As Needed., Disp: , Rfl:   •  amLODIPine (NORVASC) 5 MG tablet, TAKE 1 TABLET BY MOUTH EVERY DAY, Disp: 90 tablet, Rfl: 0  •  aspirin 81 MG tablet, Take 1 tablet by mouth daily., Disp: , Rfl:   •  Azilsartan Medoxomil (Edarbi) 40 MG tablet, Oral for 90, Disp: , Rfl:   •  carbidopa-levodopa (Sinemet)  MG per tablet, Take 1.5 tablets by mouth 4 (Four) Times a Day for 30 days., Disp: 180 tablet, Rfl: 6  •  chlorthalidone (HYGROTON) 25 MG tablet, TAKE 1 TABLET BY MOUTH EVERY DAY Oral for 30, Disp: , Rfl:   •  cinacalcet (SENSIPAR) 60 MG tablet, Take 1 tablet by mouth daily., Disp: , Rfl:   •  cloNIDine (Catapres) 0.1 MG tablet, Take 1 tablet by mouth Every 8 (Eight) Hours As Needed for High Blood Pressure (BP >180/100 mm Hg)., Disp: 10 tablet, Rfl: 5  •  hydroCHLOROthiazide (MICROZIDE) 12.5 MG capsule, TAKE 1 CAPSULE BY MOUTH EVERY DAY Oral for 90, Disp: , Rfl:   •  irbesartan (AVAPRO) 150 MG tablet, TAKE 1 TABLET BY MOUTH EVERY DAY, Disp: 90 tablet, Rfl: 1  •  levothyroxine (Synthroid) 25 MCG tablet, Take 1 tablet by mouth Daily., Disp: 90 tablet, Rfl: 1  •  nebivolol (BYSTOLIC) 10 MG tablet, TAKE 1 TABLET BY MOUTH EVERY DAY, Disp: 90 tablet, Rfl: 1  •  nitroglycerin (NITROSTAT) 0.4 MG SL tablet, Place 1 tablet under the tongue Every 5 (Five) Minutes As Needed for Chest Pain. Take no more than 3 doses in 15 minutes., Disp: 25 tablet, Rfl: 5  •  pantoprazole (PROTONIX) 40 MG EC tablet, Take 40 mg by mouth As Needed. Not currently taking due to stomach issues., Disp: , Rfl:   •  Repatha SureClick solution auto-injector SureClick injection, ADMINISTER 1 ML(140 MG) UNDER THE SKIN EVERY 2 WEEKS, Disp: 6 mL, Rfl: 3  •  denosumab (Prolia) 60  MG/ML solution prefilled syringe syringe, Inject 60 mg under the skin into the appropriate area as directed 1 (One) Time. 2 TIMES A YEAR, Disp: , Rfl:   •  PREMARIN 0.625 MG/GM vaginal cream, Insert  into the vagina 1 (One) Time Per Week., Disp: , Rfl:    Past Medical History:   Diagnosis Date   • Abdominal pain     Last Impression: 26 May 2015  Improving. The patient states she had a history of lower   abdominal pain after taking antibiotics for UTI, but this has improved.  Sanjiv Mak (Gastroenterology)   • Abnormal weight gain    • Anxiety 12/12/2016   • Arthritis of neck    • Cataract    • Cataract 2014   • Cataracts, bilateral    • CHF (congestive heart failure) ([facility])    • Congenital heart disease    • Coronary artery disease 12/12/2016    Field ST-elevation myocardial infarction, 11/25/2015, with associated chest pain, jaw pain, diaphoresis, and dyspnea; inferior-anterior ST elevation by outside telemetry strips.  Emergent cardiac catheterization, Dr. Isael Rosenbaum:  Status post drug-eluting stent to the proximal right coronary artery using a 2.25 x 23 mm Xience Alpine stent.  Ejection fraction 55% to 60%.    • CTS (carpal tunnel syndrome)    • Dysphagia 08/15/2016    Last Impression: 26 May 2015  History of intermittent dysphagia symptoms. Differentials     include esophageal dysmotility, Schatzki's ring.  Sanjiv Mak (Gastroenterology)     • Fracture of wrist    • Gout     2012   • Gout 2012   • H/O bone density study    • Headache    • Headache    • High cholesterol     1995   • Hyperparathyroidism ([facility])     2009   • Hypertension    • Hypothyroidism    • Low back strain    • Myalgia and myositis 08/15/2016   • Myocardial infarction ([facility])    • Neck strain    • Palpitations 09/25/2017   • Parathyroid disorder ([facility]) 12/12/2016    with a history of a left parathyroid parathyroidectomy.    • Peptic ulcer disease     and bleeding ulcer approximately 12 years ago; incomplete database.    • Rotator  "cuff syndrome 01/2021   • Scoliosis    • Sleep apnea       Past Surgical History:   Procedure Laterality Date   • APPENDECTOMY     • BREAST BIOPSY Right 1995    Ultrasound guided needle biopsy   • CARDIAC CATHETERIZATION  06/2022   • CAROTID STENT     • COLONOSCOPY     • CORONARY STENT PLACEMENT     • EYE SURGERY Left    • HYSTERECTOMY Bilateral 1978   • OOPHORECTOMY     • OTHER SURGICAL HISTORY      Ulcer repair   • PARATHYROIDECTOMY Left    • WRIST SURGERY  2012      Family History   Problem Relation Age of Onset   • Anemia Mother    • Arthritis Mother    • Heart disease Mother    • Hypertension Mother    • Stroke Mother    • Broken bones Mother    • Osteoporosis Mother    • Heart attack Mother    • Cancer Father         stomach   • Breast cancer Neg Hx    • Ovarian cancer Neg Hx    • Colon cancer Neg Hx    • Endometrial cancer Neg Hx         Review of Systems  Objective:    /70   Pulse 68   Ht 157.5 cm (62\")   Wt 51.7 kg (114 lb)   SpO2 98%   BMI 20.85 kg/m²     Neurology Exam:  General apperance: NAD.     Mental status: Alert, awake and oriented to time place and person.    Recent and Remote memory: Can recall 3/3 objects at 5 minutes. Can recall historical events.     Attention span and Concentration: Serial 7s: Normal.     Fund of knowledge:  Normal.     Language and Speech: No aphasia or dysarthria.    Naming , Repitition and Comprehension:  Can name objects, repeat a sentence and follow commands. Speech is clear and fluent with good repetition, comprehension, and naming.    CN II to XII: Intact.    Opthalmoscopic Exam: No papilledema.    Motor:  Right UE muscle strength 5/5. Normal tone.     Left UE muscle strength 5/5. Normal tone.      Right LE muscle strength5/5. Normal tone.     Left LE muscle strength 5/5. Normal tone.      Sensory: Normal light touch, vibration and pinprick sensation bilaterally.    DTRs: 2+ bilaterally.    Babinski: Negative bilaterally.    Co-ordination: Normal " finger-to-nose, heel to negron B/L.    Rhomberg: Negative.    Gait: Normal.  Left hand tremors were more visible while walking.  However overall, intensity of these tremors seem to be much better as compared to last visit.  No problems in turning.  Normal walking stride and very slightly reduced arm swing on the left.    Cardiovascular: Regular rate and rhythm without murmur, gallop or rub.    Assessment and Plan:  1. Parkinson's disease (HCC)  She feels that tremors are unchanged but on my examination, I feel that intensity and frequency of these tremors have reduced since starting her on Sinemet.  Currently she is on Sinemet 1 and half tablet 3 times a day it will be increased to 1 and half tablet 4 times a day for better therapeutic effect and see how she does.  I have advised her to take her medication every 4 hours for it to be more effective.  Otherwise, I will see her back in clinic in 6 months for follow-up.       I spent 30 minutes in patient care: Reviewing records prior to the visit, entering orders and documentation and spent more than raza 50% of this time face-to-face in management, instructions and education regarding above mentioned diagnosis and also on counseling and discussing about taking medication regularly, possible side effects with medication use, importance of good sleep hygiene, good hydration and regular exercise.    Return in about 6 months (around 8/13/2023).       Note to patient: The 21st Century Cures Act makes medical notes like these available to patients in the interest of transparency. However, be advised this is a medical document. It is intended as peer to peer communication. It is written in medical language and may contain abbreviations or verbiage that are unfamiliar. It may appear blunt or direct. Medical documents are intended to carry relevant information, facts as evident, and the clinical opinion of the physician.

## 2023-04-17 RX ORDER — NEBIVOLOL 10 MG/1
TABLET ORAL
Qty: 90 TABLET | Refills: 0 | Status: SHIPPED | OUTPATIENT
Start: 2023-04-17

## 2023-04-20 RX ORDER — DIPHENHYDRAMINE HYDROCHLORIDE 50 MG/ML
50 INJECTION INTRAMUSCULAR; INTRAVENOUS ONCE AS NEEDED
Status: CANCELLED
Start: 2023-04-24

## 2023-04-20 RX ORDER — FAMOTIDINE 10 MG/ML
20 INJECTION, SOLUTION INTRAVENOUS ONCE AS NEEDED
Status: CANCELLED
Start: 2023-04-24

## 2023-04-20 RX ORDER — EPINEPHRINE 0.1 MG/ML
0.3 SYRINGE (ML) INJECTION ONCE AS NEEDED
Status: CANCELLED
Start: 2023-04-24

## 2023-04-20 RX ORDER — EPINEPHRINE 0.1 MG/ML
0.1 SYRINGE (ML) INJECTION ONCE AS NEEDED
Status: CANCELLED
Start: 2023-04-24

## 2023-04-20 RX ORDER — ALBUTEROL SULFATE 90 UG/1
2 AEROSOL, METERED RESPIRATORY (INHALATION) EVERY 4 HOURS PRN
Status: CANCELLED
Start: 2023-04-24

## 2023-04-24 ENCOUNTER — HOSPITAL ENCOUNTER (OUTPATIENT)
Dept: INFUSION THERAPY | Facility: HOSPITAL | Age: 83
Discharge: HOME | End: 2023-04-24
Admitting: PHYSICIAN ASSISTANT
Payer: MEDICARE

## 2023-04-24 VITALS
OXYGEN SATURATION: 98 % | RESPIRATION RATE: 20 BRPM | SYSTOLIC BLOOD PRESSURE: 170 MMHG | TEMPERATURE: 97.7 F | HEART RATE: 62 BPM | DIASTOLIC BLOOD PRESSURE: 95 MMHG

## 2023-04-24 DIAGNOSIS — M81.0 OSTEOPOROSIS, UNSPECIFIED OSTEOPOROSIS TYPE, UNSPECIFIED PATHOLOGICAL FRACTURE PRESENCE: Primary | ICD-10-CM

## 2023-04-24 LAB
ANION GAP SERPL CALCULATED.3IONS-SCNC: 5.8 MMOL/L (ref 5–15)
BUN SERPL-MCNC: 14 MG/DL (ref 8–23)
BUN/CREAT SERPL: 17.5 (ref 7–25)
CALCIUM SPEC-SCNC: 10 MG/DL (ref 8.6–10.5)
CHLORIDE SERPL-SCNC: 102 MMOL/L (ref 98–107)
CO2 SERPL-SCNC: 29.2 MMOL/L (ref 22–29)
CREAT SERPL-MCNC: 0.8 MG/DL (ref 0.57–1)
EGFRCR SERPLBLD CKD-EPI 2021: 72.8 ML/MIN/1.73
GLUCOSE SERPL-MCNC: 97 MG/DL (ref 65–99)
POTASSIUM SERPL-SCNC: 3.9 MMOL/L (ref 3.5–5.2)
SODIUM SERPL-SCNC: 137 MMOL/L (ref 136–145)

## 2023-04-24 PROCEDURE — 25010000002 DENOSUMAB 60 MG/ML SOLUTION PREFILLED SYRINGE: Performed by: PHYSICIAN ASSISTANT

## 2023-04-24 PROCEDURE — 80048 BASIC METABOLIC PNL TOTAL CA: CPT | Performed by: PHYSICIAN ASSISTANT

## 2023-04-24 PROCEDURE — 96372 THER/PROPH/DIAG INJ SC/IM: CPT

## 2023-04-24 RX ORDER — FAMOTIDINE 10 MG/ML
20 INJECTION, SOLUTION INTRAVENOUS ONCE AS NEEDED
Start: 2023-04-24

## 2023-04-24 RX ORDER — DIPHENHYDRAMINE HYDROCHLORIDE 50 MG/ML
50 INJECTION INTRAMUSCULAR; INTRAVENOUS ONCE AS NEEDED
Start: 2023-04-24

## 2023-04-24 RX ORDER — EPINEPHRINE 0.1 MG/ML
0.3 SYRINGE (ML) INJECTION ONCE AS NEEDED
Start: 2023-04-24

## 2023-04-24 RX ORDER — EPINEPHRINE 0.1 MG/ML
0.1 SYRINGE (ML) INJECTION ONCE AS NEEDED
Start: 2023-04-24

## 2023-04-24 RX ORDER — ALBUTEROL SULFATE 90 UG/1
2 AEROSOL, METERED RESPIRATORY (INHALATION) EVERY 4 HOURS PRN
Start: 2023-04-24

## 2023-04-24 RX ADMIN — DENOSUMAB 60 MG: 60 INJECTION SUBCUTANEOUS at 15:48

## 2023-04-24 NOTE — CODE DOCUMENTATION
Blood drawn via right AC for labs. Tolerated well.  Educational information given along with information for Vitamin D and calcium supplements. Verbalized understanding.

## 2023-05-10 ENCOUNTER — TRANSCRIBE ORDERS (OUTPATIENT)
Dept: GENERAL RADIOLOGY | Facility: HOSPITAL | Age: 83
End: 2023-05-10
Payer: MEDICARE

## 2023-05-10 DIAGNOSIS — M81.0 SENILE OSTEOPOROSIS: Primary | ICD-10-CM

## 2023-05-18 ENCOUNTER — LAB (OUTPATIENT)
Dept: LAB | Facility: HOSPITAL | Age: 83
End: 2023-05-18
Payer: MEDICARE

## 2023-05-18 DIAGNOSIS — M81.0 SENILE OSTEOPOROSIS: ICD-10-CM

## 2023-05-18 LAB
ANION GAP SERPL CALCULATED.3IONS-SCNC: 8.7 MMOL/L (ref 5–15)
BUN SERPL-MCNC: 15 MG/DL (ref 8–23)
BUN/CREAT SERPL: 18.3 (ref 7–25)
CALCIUM SPEC-SCNC: 8.8 MG/DL (ref 8.6–10.5)
CHLORIDE SERPL-SCNC: 103 MMOL/L (ref 98–107)
CO2 SERPL-SCNC: 28.3 MMOL/L (ref 22–29)
CREAT SERPL-MCNC: 0.82 MG/DL (ref 0.57–1)
EGFRCR SERPLBLD CKD-EPI 2021: 70.6 ML/MIN/1.73
GLUCOSE SERPL-MCNC: 89 MG/DL (ref 65–99)
POTASSIUM SERPL-SCNC: 4.2 MMOL/L (ref 3.5–5.2)
SODIUM SERPL-SCNC: 140 MMOL/L (ref 136–145)

## 2023-05-18 PROCEDURE — 80048 BASIC METABOLIC PNL TOTAL CA: CPT

## 2023-05-18 PROCEDURE — 36415 COLL VENOUS BLD VENIPUNCTURE: CPT

## 2023-05-22 ENCOUNTER — TELEPHONE (OUTPATIENT)
Dept: NEUROLOGY | Facility: CLINIC | Age: 83
End: 2023-05-22
Payer: MEDICARE

## 2023-05-22 NOTE — TELEPHONE ENCOUNTER
PT CALLING TO [facility] SHE IS TAKING  CARBIDOPA-LEVODOPA  MG PER TABLET - 1/5 TABLETS BY MOUTH 4X DAY.    PT SAYS SHE IS SICK AFTER TAKING THIS MEDICATION AND IS MISSING THERAPY APPTS DUE TO BEING SICK.    PLEASE [facility]VISE PT     THANK YOU

## 2023-07-18 ENCOUNTER — APPOINTMENT (OUTPATIENT)
Dept: GENERAL RADIOLOGY | Facility: HOSPITAL | Age: 83
End: 2023-07-18
Payer: MEDICARE

## 2023-07-18 ENCOUNTER — HOSPITAL ENCOUNTER (OUTPATIENT)
Facility: HOSPITAL | Age: 83
Setting detail: OBSERVATION
Discharge: HOME | End: 2023-07-20
Attending: EMERGENCY MEDICINE | Admitting: HOSPITALIST
Payer: MEDICARE

## 2023-07-18 DIAGNOSIS — E78.5 HYPERLIPIDEMIA, UNSPECIFIED HYPERLIPIDEMIA TYPE: ICD-10-CM

## 2023-07-18 DIAGNOSIS — R07.9 CHEST PAIN, UNSPECIFIED TYPE: Primary | ICD-10-CM

## 2023-07-18 DIAGNOSIS — I20.0 UNSTABLE ANGINA: ICD-10-CM

## 2023-07-18 DIAGNOSIS — I21.4 NSTEMI, INITIAL EPISODE OF CARE: ICD-10-CM

## 2023-07-18 DIAGNOSIS — I10 ELEVATED BLOOD PRESSURE READING WITH DIAGNOSIS OF HYPERTENSION: ICD-10-CM

## 2023-07-18 DIAGNOSIS — K21.9 GASTROESOPHAGEAL REFLUX DISEASE, UNSPECIFIED WHETHER ESOPHAGITIS PRESENT: ICD-10-CM

## 2023-07-18 DIAGNOSIS — Z86.79 HISTORY OF CAD (CORONARY ARTERY DISEASE): ICD-10-CM

## 2023-07-18 PROBLEM — D64.9 ANEMIA OF UNKNOWN ETIOLOGY: Status: ACTIVE | Noted: 2023-07-18

## 2023-07-18 LAB
ALBUMIN SERPL-MCNC: 3.8 G/DL (ref 3.5–5.2)
ALBUMIN/GLOB SERPL: 2.5 G/DL
ALP SERPL-CCNC: 71 U/L (ref 39–117)
ALT SERPL W P-5'-P-CCNC: <5 U/L (ref 1–33)
ANION GAP SERPL CALCULATED.3IONS-SCNC: 8 MMOL/L (ref 5–15)
AST SERPL-CCNC: 13 U/L (ref 1–32)
BASOPHILS # BLD AUTO: 0.01 10*3/MM3 (ref 0–0.2)
BASOPHILS NFR BLD AUTO: 0.2 % (ref 0–1.5)
BILIRUB SERPL-MCNC: 0.4 MG/DL (ref 0–1.2)
BUN SERPL-MCNC: 11 MG/DL (ref 8–23)
BUN/CREAT SERPL: 17.5 (ref 7–25)
CALCIUM SPEC-SCNC: 8 MG/DL (ref 8.6–10.5)
CHLORIDE SERPL-SCNC: 108 MMOL/L (ref 98–107)
CO2 SERPL-SCNC: 25 MMOL/L (ref 22–29)
CREAT SERPL-MCNC: 0.63 MG/DL (ref 0.57–1)
D DIMER PPP FEU-MCNC: 0.29 MCGFEU/ML (ref 0–0.84)
D-LACTATE SERPL-SCNC: 0.8 MMOL/L (ref 0.5–2)
DEPRECATED RDW RBC AUTO: 44.9 FL (ref 37–54)
EGFRCR SERPLBLD CKD-EPI 2021: 87.6 ML/MIN/1.73
EOSINOPHIL # BLD AUTO: 0.03 10*3/MM3 (ref 0–0.4)
EOSINOPHIL NFR BLD AUTO: 0.7 % (ref 0.3–6.2)
ERYTHROCYTE [DISTWIDTH] IN BLOOD BY AUTOMATED COUNT: 13.1 % (ref 12.3–15.4)
FERRITIN SERPL-MCNC: 101.2 NG/ML (ref 13–150)
GEN 5 2HR TROPONIN T REFLEX: 16 NG/L
GLOBULIN UR ELPH-MCNC: 1.5 GM/DL
GLUCOSE SERPL-MCNC: 110 MG/DL (ref 65–99)
HBA1C MFR BLD: 4.6 % (ref 4.8–5.6)
HCT VFR BLD AUTO: 34.5 % (ref 34–46.6)
HGB BLD-MCNC: 10.8 G/DL (ref 12–15.9)
HOLD SPECIMEN: NORMAL
IMM GRANULOCYTES # BLD AUTO: 0.27 10*3/MM3 (ref 0–0.05)
IMM GRANULOCYTES NFR BLD AUTO: 6.3 % (ref 0–0.5)
IRON 24H UR-MRATE: 23 MCG/DL (ref 37–145)
IRON SATN MFR SERPL: 8 % (ref 20–50)
LIPASE SERPL-CCNC: 52 U/L (ref 13–60)
LYMPHOCYTES # BLD AUTO: 0.58 10*3/MM3 (ref 0.7–3.1)
LYMPHOCYTES NFR BLD AUTO: 13.5 % (ref 19.6–45.3)
MCH RBC QN AUTO: 29.7 PG (ref 26.6–33)
MCHC RBC AUTO-ENTMCNC: 31.3 G/DL (ref 31.5–35.7)
MCV RBC AUTO: 94.8 FL (ref 79–97)
MONOCYTES # BLD AUTO: 0.63 10*3/MM3 (ref 0.1–0.9)
MONOCYTES NFR BLD AUTO: 14.6 % (ref 5–12)
NEUTROPHILS NFR BLD AUTO: 2.79 10*3/MM3 (ref 1.7–7)
NEUTROPHILS NFR BLD AUTO: 64.7 % (ref 42.7–76)
NRBC BLD AUTO-RTO: 0 /100 WBC (ref 0–0.2)
NT-PROBNP SERPL-MCNC: 644.5 PG/ML (ref 0–1800)
PLAT MORPH BLD: NORMAL
PLATELET # BLD AUTO: 112 10*3/MM3 (ref 140–450)
PMV BLD AUTO: 10.1 FL (ref 6–12)
POTASSIUM SERPL-SCNC: 3.8 MMOL/L (ref 3.5–5.2)
PROT SERPL-MCNC: 5.3 G/DL (ref 6–8.5)
QT INTERVAL: 456 MS
QTC INTERVAL: 447 MS
RBC # BLD AUTO: 3.64 10*6/MM3 (ref 3.77–5.28)
RBC MORPH BLD: NORMAL
SODIUM SERPL-SCNC: 141 MMOL/L (ref 136–145)
TIBC SERPL-MCNC: 288 MCG/DL (ref 298–536)
TOXIC GRANULATION: NORMAL
TRANSFERRIN SERPL-MCNC: 193 MG/DL (ref 200–360)
TROPONIN T DELTA: 2 NG/L
TROPONIN T SERPL HS-MCNC: 14 NG/L
TSH SERPL DL<=0.05 MIU/L-ACNC: 7.38 UIU/ML (ref 0.27–4.2)
WBC NRBC COR # BLD: 4.31 10*3/MM3 (ref 3.4–10.8)
WHOLE BLOOD HOLD COAG: NORMAL
WHOLE BLOOD HOLD SPECIMEN: NORMAL

## 2023-07-18 PROCEDURE — 82607 VITAMIN B-12: CPT | Performed by: NURSE PRACTITIONER

## 2023-07-18 PROCEDURE — 84466 ASSAY OF TRANSFERRIN: CPT | Performed by: NURSE PRACTITIONER

## 2023-07-18 PROCEDURE — 85045 AUTOMATED RETICULOCYTE COUNT: CPT | Performed by: NURSE PRACTITIONER

## 2023-07-18 PROCEDURE — 99285 EMERGENCY DEPT VISIT HI MDM: CPT

## 2023-07-18 PROCEDURE — 93005 ELECTROCARDIOGRAM TRACING: CPT | Performed by: EMERGENCY MEDICINE

## 2023-07-18 PROCEDURE — 25010000002 ENOXAPARIN PER 10 MG: Performed by: EMERGENCY MEDICINE

## 2023-07-18 PROCEDURE — 83605 ASSAY OF LACTIC ACID: CPT | Performed by: NURSE PRACTITIONER

## 2023-07-18 PROCEDURE — 36415 COLL VENOUS BLD VENIPUNCTURE: CPT

## 2023-07-18 PROCEDURE — G0378 HOSPITAL OBSERVATION PER HR: HCPCS

## 2023-07-18 PROCEDURE — 82728 ASSAY OF FERRITIN: CPT | Performed by: NURSE PRACTITIONER

## 2023-07-18 PROCEDURE — 83036 HEMOGLOBIN GLYCOSYLATED A1C: CPT | Performed by: NURSE PRACTITIONER

## 2023-07-18 PROCEDURE — 85014 HEMATOCRIT: CPT | Performed by: NURSE PRACTITIONER

## 2023-07-18 PROCEDURE — 96372 THER/PROPH/DIAG INJ SC/IM: CPT

## 2023-07-18 PROCEDURE — 82746 ASSAY OF FOLIC ACID SERUM: CPT | Performed by: NURSE PRACTITIONER

## 2023-07-18 PROCEDURE — 80053 COMPREHEN METABOLIC PANEL: CPT | Performed by: EMERGENCY MEDICINE

## 2023-07-18 PROCEDURE — 83540 ASSAY OF IRON: CPT | Performed by: NURSE PRACTITIONER

## 2023-07-18 PROCEDURE — 83690 ASSAY OF LIPASE: CPT | Performed by: EMERGENCY MEDICINE

## 2023-07-18 PROCEDURE — 85007 BL SMEAR W/DIFF WBC COUNT: CPT | Performed by: EMERGENCY MEDICINE

## 2023-07-18 PROCEDURE — 84484 ASSAY OF TROPONIN QUANT: CPT | Performed by: EMERGENCY MEDICINE

## 2023-07-18 PROCEDURE — 83880 ASSAY OF NATRIURETIC PEPTIDE: CPT | Performed by: EMERGENCY MEDICINE

## 2023-07-18 PROCEDURE — 71045 X-RAY EXAM CHEST 1 VIEW: CPT

## 2023-07-18 PROCEDURE — 85018 HEMOGLOBIN: CPT | Performed by: NURSE PRACTITIONER

## 2023-07-18 PROCEDURE — 84443 ASSAY THYROID STIM HORMONE: CPT | Performed by: NURSE PRACTITIONER

## 2023-07-18 PROCEDURE — 85379 FIBRIN DEGRADATION QUANT: CPT | Performed by: NURSE PRACTITIONER

## 2023-07-18 PROCEDURE — 85025 COMPLETE CBC W/AUTO DIFF WBC: CPT | Performed by: EMERGENCY MEDICINE

## 2023-07-18 RX ORDER — CHOLECALCIFEROL (VITAMIN D3) 125 MCG
5 CAPSULE ORAL NIGHTLY PRN
Status: DISCONTINUED | OUTPATIENT
Start: 2023-07-18 | End: 2023-07-20 | Stop reason: HOSPADM

## 2023-07-18 RX ORDER — SODIUM CHLORIDE 0.9 % (FLUSH) 0.9 %
10 SYRINGE (ML) INJECTION EVERY 12 HOURS SCHEDULED
Status: DISCONTINUED | OUTPATIENT
Start: 2023-07-18 | End: 2023-07-20 | Stop reason: HOSPADM

## 2023-07-18 RX ORDER — POLYETHYLENE GLYCOL 3350 17 G/17G
17 POWDER, FOR SOLUTION ORAL DAILY PRN
Status: DISCONTINUED | OUTPATIENT
Start: 2023-07-18 | End: 2023-07-20 | Stop reason: HOSPADM

## 2023-07-18 RX ORDER — DROPERIDOL 2.5 MG/ML
1.25 INJECTION, SOLUTION INTRAMUSCULAR; INTRAVENOUS ONCE
Status: DISCONTINUED | OUTPATIENT
Start: 2023-07-18 | End: 2023-07-18

## 2023-07-18 RX ORDER — ASPIRIN 81 MG/1
81 TABLET ORAL DAILY
Status: DISCONTINUED | OUTPATIENT
Start: 2023-07-19 | End: 2023-07-20 | Stop reason: HOSPADM

## 2023-07-18 RX ORDER — BISACODYL 10 MG
10 SUPPOSITORY, RECTAL RECTAL DAILY PRN
Status: DISCONTINUED | OUTPATIENT
Start: 2023-07-18 | End: 2023-07-20 | Stop reason: HOSPADM

## 2023-07-18 RX ORDER — ASPIRIN 81 MG/1
324 TABLET, CHEWABLE ORAL ONCE
Status: DISCONTINUED | OUTPATIENT
Start: 2023-07-18 | End: 2023-07-18

## 2023-07-18 RX ORDER — ONDANSETRON 4 MG/1
4 TABLET, FILM COATED ORAL EVERY 6 HOURS PRN
Status: DISCONTINUED | OUTPATIENT
Start: 2023-07-18 | End: 2023-07-20 | Stop reason: HOSPADM

## 2023-07-18 RX ORDER — SODIUM CHLORIDE 9 MG/ML
40 INJECTION, SOLUTION INTRAVENOUS AS NEEDED
Status: DISCONTINUED | OUTPATIENT
Start: 2023-07-18 | End: 2023-07-20 | Stop reason: HOSPADM

## 2023-07-18 RX ORDER — LOSARTAN POTASSIUM 50 MG/1
50 TABLET ORAL
Status: DISCONTINUED | OUTPATIENT
Start: 2023-07-19 | End: 2023-07-20 | Stop reason: HOSPADM

## 2023-07-18 RX ORDER — BISACODYL 5 MG/1
5 TABLET, DELAYED RELEASE ORAL DAILY PRN
Status: DISCONTINUED | OUTPATIENT
Start: 2023-07-18 | End: 2023-07-20 | Stop reason: HOSPADM

## 2023-07-18 RX ORDER — SODIUM CHLORIDE 0.9 % (FLUSH) 0.9 %
10 SYRINGE (ML) INJECTION AS NEEDED
Status: DISCONTINUED | OUTPATIENT
Start: 2023-07-18 | End: 2023-07-20 | Stop reason: HOSPADM

## 2023-07-18 RX ORDER — NITROGLYCERIN 20 MG/100ML
10-50 INJECTION INTRAVENOUS
Status: DISCONTINUED | OUTPATIENT
Start: 2023-07-18 | End: 2023-07-19

## 2023-07-18 RX ORDER — CINACALCET 30 MG/1
60 TABLET, FILM COATED ORAL DAILY
Status: DISCONTINUED | OUTPATIENT
Start: 2023-07-19 | End: 2023-07-20 | Stop reason: HOSPADM

## 2023-07-18 RX ORDER — NEBIVOLOL 10 MG/1
10 TABLET ORAL DAILY
Status: DISCONTINUED | OUTPATIENT
Start: 2023-07-19 | End: 2023-07-20 | Stop reason: HOSPADM

## 2023-07-18 RX ORDER — AMOXICILLIN 250 MG
2 CAPSULE ORAL 2 TIMES DAILY
Status: DISCONTINUED | OUTPATIENT
Start: 2023-07-18 | End: 2023-07-20 | Stop reason: HOSPADM

## 2023-07-18 RX ORDER — LEVOTHYROXINE SODIUM 0.03 MG/1
25 TABLET ORAL DAILY
Status: DISCONTINUED | OUTPATIENT
Start: 2023-07-19 | End: 2023-07-20 | Stop reason: HOSPADM

## 2023-07-18 RX ORDER — ACETAMINOPHEN 650 MG/1
650 SUPPOSITORY RECTAL EVERY 4 HOURS PRN
Status: DISCONTINUED | OUTPATIENT
Start: 2023-07-18 | End: 2023-07-20 | Stop reason: HOSPADM

## 2023-07-18 RX ORDER — ACETAMINOPHEN 500 MG
1000 TABLET ORAL ONCE
Status: DISCONTINUED | OUTPATIENT
Start: 2023-07-18 | End: 2023-07-18

## 2023-07-18 RX ORDER — ENOXAPARIN SODIUM 100 MG/ML
1 INJECTION SUBCUTANEOUS ONCE
Status: COMPLETED | OUTPATIENT
Start: 2023-07-18 | End: 2023-07-18

## 2023-07-18 RX ORDER — AMLODIPINE BESYLATE 5 MG/1
5 TABLET ORAL DAILY
Status: DISCONTINUED | OUTPATIENT
Start: 2023-07-19 | End: 2023-07-20 | Stop reason: HOSPADM

## 2023-07-18 RX ORDER — ACETAMINOPHEN 160 MG/5ML
650 SOLUTION ORAL EVERY 4 HOURS PRN
Status: DISCONTINUED | OUTPATIENT
Start: 2023-07-18 | End: 2023-07-20 | Stop reason: HOSPADM

## 2023-07-18 RX ORDER — ONDANSETRON 2 MG/ML
4 INJECTION INTRAMUSCULAR; INTRAVENOUS EVERY 6 HOURS PRN
Status: DISCONTINUED | OUTPATIENT
Start: 2023-07-18 | End: 2023-07-20 | Stop reason: HOSPADM

## 2023-07-18 RX ORDER — FAMOTIDINE 10 MG/ML
20 INJECTION, SOLUTION INTRAVENOUS EVERY 12 HOURS SCHEDULED
Status: DISCONTINUED | OUTPATIENT
Start: 2023-07-18 | End: 2023-07-19

## 2023-07-18 RX ORDER — SODIUM CHLORIDE 9 MG/ML
75 INJECTION, SOLUTION INTRAVENOUS CONTINUOUS
Status: ACTIVE | OUTPATIENT
Start: 2023-07-18 | End: 2023-07-19

## 2023-07-18 RX ORDER — ACETAMINOPHEN 325 MG/1
650 TABLET ORAL EVERY 4 HOURS PRN
Status: DISCONTINUED | OUTPATIENT
Start: 2023-07-18 | End: 2023-07-20 | Stop reason: HOSPADM

## 2023-07-18 RX ORDER — MORPHINE SULFATE 4 MG/ML
4 INJECTION, SOLUTION INTRAMUSCULAR; INTRAVENOUS ONCE
Status: DISCONTINUED | OUTPATIENT
Start: 2023-07-18 | End: 2023-07-18

## 2023-07-18 RX ADMIN — ENOXAPARIN SODIUM 50 MG: 60 INJECTION SUBCUTANEOUS at 22:14

## 2023-07-18 NOTE — ED PROVIDER NOTES
Subjective   History of Present Illness  82 year old female presenting to the ER by ambulance today for left-sided chest pain that started around 1400 this afternoon.  She was sitting down sorting papers when the chest pain occurred and also got a severe headache along with it.  The combination of chest pain and severe headache is the way her previous MI in 2015 presented.  Currently her headache today has reduced to a 5/10 dull throb. Today she also had nausea and vomiting after the onset of pain which she has not had previously.  She was given nitroglycerin on her way in on the ambulance.  She had another episode like the one today a couple of days ago that lasted a couple of hours.  At that time she was up moving around but doesn't remember exactly what she was doing. She is on a blood thinner although she doesn't remember the name and is also taking aspirin.    History provided by:  Patient    Review of Systems    Past Medical History:   Diagnosis Date    Abdominal pain     Last Impression: 26 May 2015  Improving. The patient states she had a history of lower   abdominal pain after taking antibiotics for UTI, but this has improved.  Sanjiv Mak (Gastroenterology)    Abnormal weight gain     Anxiety 12/12/2016    Arthritis of neck     Cataract     Cataract 2014    Cataracts, bilateral     CHF (congestive heart failure)     Congenital heart disease     Coronary artery disease 12/12/2016    Field ST-elevation myocardial infarction, 11/25/2015, with associated chest pain, jaw pain, diaphoresis, and dyspnea; inferior-anterior ST elevation by outside telemetry strips.  Emergent cardiac catheterization, Dr. Isael Rosenbaum:  Status post drug-eluting stent to the proximal right coronary artery using a 2.25 x 23 mm Xience Alpine stent.  Ejection fraction 55% to 60%.     CTS (carpal tunnel syndrome)     Dysphagia 08/15/2016    Last Impression: 26 May 2015  History of intermittent dysphagia symptoms. Differentials      include esophageal dysmotility, Schatzki's ring.  Sanjiv Mak (Gastroenterology)      Fracture of wrist     Gout     2012    Gout 2012    H/O bone density study     Headache     Headache     High cholesterol     1995    Hyperparathyroidism     2009    Hypertension     Hypothyroidism     Low back strain     Myalgia and myositis 08/15/2016    Myocardial infarction     Neck strain     Palpitations 09/25/2017    Parathyroid disorder 12/12/2016    with a history of a left parathyroid parathyroidectomy.     Peptic ulcer disease     and bleeding ulcer approximately 12 years ago; incomplete database.     Rotator cuff syndrome 01/2021    Scoliosis        Allergies   Allergen Reactions    Pravachol [Pravastatin Sodium] Myalgia    Statins Myalgia    Zocor [Simvastatin] Myalgia    Aspirin Rash    Bactrim [Sulfamethoxazole-Trimethoprim] Rash    Crestor [Rosuvastatin Calcium] Myalgia    Lipitor [Atorvastatin] Myalgia       Past Surgical History:   Procedure Laterality Date    APPENDECTOMY      BREAST BIOPSY Right 1995    Ultrasound guided needle biopsy    CARDIAC CATHETERIZATION  06/2022    CAROTID STENT      COLONOSCOPY      CORONARY STENT PLACEMENT      EYE SURGERY Left     HYSTERECTOMY Bilateral 1978    OOPHORECTOMY      OTHER SURGICAL HISTORY      Ulcer repair    PARATHYROIDECTOMY Left     WRIST SURGERY  2012       Family History   Problem Relation Age of Onset    Anemia Mother     Arthritis Mother     Heart disease Mother     Hypertension Mother     Stroke Mother     Broken bones Mother     Osteoporosis Mother     Heart attack Mother     Cancer Father         stomach    Breast cancer Neg Hx     Ovarian cancer Neg Hx     Colon cancer Neg Hx     Endometrial cancer Neg Hx        Social History     Socioeconomic History    Marital status:    Tobacco Use    Smoking status: Never    Smokeless tobacco: Never   Vaping Use    Vaping Use: Never used   Substance and Sexual Activity    Alcohol use: No    Drug use: No     Sexual activity: Not Currently     Partners: Male           Objective   Physical Exam  Vitals and nursing note reviewed.   HENT:      Head: Normocephalic.   Eyes:      Pupils: Pupils are equal, round, and reactive to light.   Cardiovascular:      Rate and Rhythm: Normal rate and regular rhythm.      Pulses:           Radial pulses are 2+ on the right side and 2+ on the left side.        Posterior tibial pulses are 2+ on the right side and 2+ on the left side.      Heart sounds: Normal heart sounds.   Pulmonary:      Effort: Pulmonary effort is normal.      Breath sounds: Normal breath sounds.   Abdominal:      General: Bowel sounds are normal.      Palpations: Abdomen is soft.   Skin:     General: Skin is warm and dry.   Neurological:      General: No focal deficit present.      Mental Status: She is alert.   Psychiatric:         Mood and Affect: Mood normal.         Behavior: Behavior normal.       Procedures           ED Course  ED Course as of 07/19/23 1838 Tue Jul 18, 2023   1706 XR Chest 1 View  Personally reviewed the single view the chest.  On my interpretation there is no focal infiltrate or emergent finding.  See report from radiology for details. [RS]   1914 Patient with chest pain she states feels just like when she has had before with acute coronary syndrome and requiring stent.  Patient recalls the last intervention that she had was approximately 2015.  No evidence of ST elevation MI or NSTEMI at this time.  We will plan admission to the hospital for further evaluation and management.  Hospitalist messaged for admission. [RS]      ED Course User Index  [RS] Travis Perez MD                      HEART Score: 6                      Medical Decision Making  Problems Addressed:  Chest pain, unspecified type: complicated acute illness or injury  Elevated blood pressure reading with diagnosis of hypertension: complicated acute illness or injury  History of CAD (coronary artery disease): complicated  acute illness or injury  Hyperlipidemia, unspecified hyperlipidemia type: complicated acute illness or injury    Amount and/or Complexity of Data Reviewed  Independent Historian: EMS  External Data Reviewed: ECG and notes.  Labs: ordered.  Radiology: ordered. Decision-making details documented in ED Course.  ECG/medicine tests: ordered.    Risk  OTC drugs.  Prescription drug management.  Decision regarding hospitalization.        Final diagnoses:   Chest pain, unspecified type   History of CAD (coronary artery disease)   Elevated blood pressure reading with diagnosis of hypertension   Hyperlipidemia, unspecified hyperlipidemia type       ED Disposition  ED Disposition       ED Disposition   Decision to Admit    Condition   --    Comment   Level of Care: Telemetry [5]   Diagnosis: Chest pain [070082]   Admitting Physician: OSMAR CIFUENTES [[id number]]   Attending Physician: OSMAR CIFUENTES [[id number]]                 No follow-up provider specified.       Medication List      No changes were made to your prescriptions during this visit.            Travis Perez MD  07/19/23 [id number]

## 2023-07-18 NOTE — Clinical Note
Level of Care: Telemetry [5]   Diagnosis: Chest pain [007143]   Admitting Physician: OSMAR CIFUENTES [[id number]]   Attending Physician: OSMAR CIFUENTES [[id number]]

## 2023-07-19 ENCOUNTER — APPOINTMENT (OUTPATIENT)
Dept: CARDIOLOGY | Facility: HOSPITAL | Age: 83
End: 2023-07-19
Payer: MEDICARE

## 2023-07-19 PROBLEM — I21.4 NSTEMI, INITIAL EPISODE OF CARE: Status: ACTIVE | Noted: 2023-07-18

## 2023-07-19 PROBLEM — I20.0 UNSTABLE ANGINA: Status: ACTIVE | Noted: 2023-07-18

## 2023-07-19 LAB
ANION GAP SERPL CALCULATED.3IONS-SCNC: 10 MMOL/L (ref 5–15)
BH CV ECHO MEAS - AI P1/2T: 592.5 MSEC
BH CV ECHO MEAS - AO MAX PG: 6.9 MMHG
BH CV ECHO MEAS - AO MEAN PG: 4 MMHG
BH CV ECHO MEAS - AO ROOT DIAM: 3.1 CM
BH CV ECHO MEAS - AO V2 MAX: 131 CM/SEC
BH CV ECHO MEAS - AO V2 VTI: 34.3 CM
BH CV ECHO MEAS - AVA(I,D): 2.7 CM2
BH CV ECHO MEAS - EDV(CUBED): 64 ML
BH CV ECHO MEAS - EDV(MOD-SP2): 72.4 ML
BH CV ECHO MEAS - EDV(MOD-SP4): 76.3 ML
BH CV ECHO MEAS - EF(MOD-BP): 63 %
BH CV ECHO MEAS - EF(MOD-SP2): 62.4 %
BH CV ECHO MEAS - EF(MOD-SP4): 61.5 %
BH CV ECHO MEAS - ESV(CUBED): 22 ML
BH CV ECHO MEAS - ESV(MOD-SP2): 27.2 ML
BH CV ECHO MEAS - ESV(MOD-SP4): 29.4 ML
BH CV ECHO MEAS - FS: 30 %
BH CV ECHO MEAS - IVS/LVPW: 1 CM
BH CV ECHO MEAS - IVSD: 1.2 CM
BH CV ECHO MEAS - LA DIMENSION: 3.7 CM
BH CV ECHO MEAS - LAT PEAK E' VEL: 12.4 CM/SEC
BH CV ECHO MEAS - LV MASS(C)D: 109.7 GRAMS
BH CV ECHO MEAS - LV MAX PG: 4.4 MMHG
BH CV ECHO MEAS - LV MEAN PG: 3 MMHG
BH CV ECHO MEAS - LV V1 MAX: 105 CM/SEC
BH CV ECHO MEAS - LV V1 VTI: 29.6 CM
BH CV ECHO MEAS - LVIDD: 4 CM
BH CV ECHO MEAS - LVIDS: 2.8 CM
BH CV ECHO MEAS - LVOT AREA: 3.1 CM2
BH CV ECHO MEAS - LVOT DIAM: 2 CM
BH CV ECHO MEAS - LVPWD: 0.9 CM
BH CV ECHO MEAS - MED PEAK E' VEL: 7.8 CM/SEC
BH CV ECHO MEAS - MV A MAX VEL: 70.7 CM/SEC
BH CV ECHO MEAS - MV DEC SLOPE: 447 CM/SEC2
BH CV ECHO MEAS - MV DEC TIME: 0.26 MSEC
BH CV ECHO MEAS - MV E MAX VEL: 71.8 CM/SEC
BH CV ECHO MEAS - MV E/A: 1.02
BH CV ECHO MEAS - MV MAX PG: 3.9 MMHG
BH CV ECHO MEAS - MV MEAN PG: 2 MMHG
BH CV ECHO MEAS - MV P1/2T: 67.5 MSEC
BH CV ECHO MEAS - MV V2 VTI: 40.5 CM
BH CV ECHO MEAS - MVA(P1/2T): 3.3 CM2
BH CV ECHO MEAS - MVA(VTI): 2.3 CM2
BH CV ECHO MEAS - PA ACC TIME: 0.12 SEC
BH CV ECHO MEAS - RAP SYSTOLE: 8 MMHG
BH CV ECHO MEAS - RVSP: 32 MMHG
BH CV ECHO MEAS - SV(LVOT): 93 ML
BH CV ECHO MEAS - SV(MOD-SP2): 45.2 ML
BH CV ECHO MEAS - SV(MOD-SP4): 46.9 ML
BH CV ECHO MEAS - TAPSE (>1.6): 2.25 CM
BH CV ECHO MEAS - TR MAX PG: 23.7 MMHG
BH CV ECHO MEAS - TR MAX VEL: 242.8 CM/SEC
BH CV ECHO MEASUREMENTS AVERAGE E/E' RATIO: 7.11
BH CV VAS BP RIGHT ARM: NORMAL MMHG
BH CV XLRA - RV BASE: 3.4 CM
BH CV XLRA - RV LENGTH: 6.2 CM
BH CV XLRA - RV MID: 2.5 CM
BH CV XLRA - TDI S': 15.4 CM/SEC
BUN SERPL-MCNC: 10 MG/DL (ref 8–23)
BUN/CREAT SERPL: 17.5 (ref 7–25)
CALCIUM SPEC-SCNC: 8.4 MG/DL (ref 8.6–10.5)
CHLORIDE SERPL-SCNC: 106 MMOL/L (ref 98–107)
CO2 SERPL-SCNC: 24 MMOL/L (ref 22–29)
CREAT SERPL-MCNC: 0.57 MG/DL (ref 0.57–1)
EGFRCR SERPLBLD CKD-EPI 2021: 89.7 ML/MIN/1.73
FOLATE SERPL-MCNC: 8.65 NG/ML (ref 4.78–24.2)
GLUCOSE SERPL-MCNC: 80 MG/DL (ref 65–99)
HCT VFR BLD AUTO: 34.2 % (ref 34–46.6)
HCT VFR BLD AUTO: 34.4 % (ref 34–46.6)
HGB BLD-MCNC: 11.1 G/DL (ref 12–15.9)
HGB BLD-MCNC: 11.1 G/DL (ref 12–15.9)
LEFT ATRIUM VOLUME INDEX: 34 ML/M2
POTASSIUM SERPL-SCNC: 3.6 MMOL/L (ref 3.5–5.2)
RETICS # AUTO: 0.06 10*6/MM3 (ref 0.02–0.13)
RETICS/RBC NFR AUTO: 1.55 % (ref 0.7–1.9)
SODIUM SERPL-SCNC: 140 MMOL/L (ref 136–145)
TROPONIN T SERPL HS-MCNC: 17 NG/L
VIT B12 BLD-MCNC: 390 PG/ML (ref 211–946)

## 2023-07-19 PROCEDURE — 85018 HEMOGLOBIN: CPT | Performed by: NURSE PRACTITIONER

## 2023-07-19 PROCEDURE — 96376 TX/PRO/DX INJ SAME DRUG ADON: CPT

## 2023-07-19 PROCEDURE — 99204 OFFICE O/P NEW MOD 45 MIN: CPT | Performed by: PHYSICIAN ASSISTANT

## 2023-07-19 PROCEDURE — G0378 HOSPITAL OBSERVATION PER HR: HCPCS

## 2023-07-19 PROCEDURE — 85014 HEMATOCRIT: CPT | Performed by: NURSE PRACTITIONER

## 2023-07-19 PROCEDURE — C1894 INTRO/SHEATH, NON-LASER: HCPCS | Performed by: INTERNAL MEDICINE

## 2023-07-19 PROCEDURE — 99203 OFFICE O/P NEW LOW 30 MIN: CPT | Performed by: INTERNAL MEDICINE

## 2023-07-19 PROCEDURE — 96375 TX/PRO/DX INJ NEW DRUG ADDON: CPT

## 2023-07-19 PROCEDURE — 96374 THER/PROPH/DIAG INJ IV PUSH: CPT

## 2023-07-19 PROCEDURE — 93306 TTE W/DOPPLER COMPLETE: CPT | Performed by: INTERNAL MEDICINE

## 2023-07-19 PROCEDURE — 84484 ASSAY OF TROPONIN QUANT: CPT | Performed by: NURSE PRACTITIONER

## 2023-07-19 PROCEDURE — C1769 GUIDE WIRE: HCPCS | Performed by: INTERNAL MEDICINE

## 2023-07-19 PROCEDURE — 93306 TTE W/DOPPLER COMPLETE: CPT

## 2023-07-19 PROCEDURE — 25010000002 ONDANSETRON PER 1 MG: Performed by: NURSE PRACTITIONER

## 2023-07-19 PROCEDURE — 80048 BASIC METABOLIC PNL TOTAL CA: CPT | Performed by: NURSE PRACTITIONER

## 2023-07-19 RX ORDER — PANTOPRAZOLE SODIUM 40 MG/10ML
40 INJECTION, POWDER, LYOPHILIZED, FOR SOLUTION INTRAVENOUS DAILY
Status: DISCONTINUED | OUTPATIENT
Start: 2023-07-19 | End: 2023-07-20 | Stop reason: HOSPADM

## 2023-07-19 RX ORDER — POTASSIUM CHLORIDE 20 MEQ/1
40 TABLET, EXTENDED RELEASE ORAL EVERY 4 HOURS
Status: COMPLETED | OUTPATIENT
Start: 2023-07-19 | End: 2023-07-19

## 2023-07-19 RX ADMIN — CINACALCET 60 MG: 30 TABLET ORAL at 08:49

## 2023-07-19 RX ADMIN — LOSARTAN POTASSIUM 50 MG: 50 TABLET, FILM COATED ORAL at 08:12

## 2023-07-19 RX ADMIN — LEVOTHYROXINE SODIUM 25 MCG: 0.03 TABLET ORAL at 05:41

## 2023-07-19 RX ADMIN — FAMOTIDINE 20 MG: 10 INJECTION INTRAVENOUS at 00:02

## 2023-07-19 RX ADMIN — Medication 10 ML: at 00:04

## 2023-07-19 RX ADMIN — CARBIDOPA AND LEVODOPA 1.5 TABLET: 25; 100 TABLET ORAL at 13:09

## 2023-07-19 RX ADMIN — AMLODIPINE BESYLATE 5 MG: 5 TABLET ORAL at 08:12

## 2023-07-19 RX ADMIN — CARBIDOPA AND LEVODOPA 1.5 TABLET: 25; 100 TABLET ORAL at 20:58

## 2023-07-19 RX ADMIN — POTASSIUM CHLORIDE 40 MEQ: 1500 TABLET, EXTENDED RELEASE ORAL at 13:09

## 2023-07-19 RX ADMIN — ASPIRIN 81 MG: 81 TABLET, COATED ORAL at 08:12

## 2023-07-19 RX ADMIN — FAMOTIDINE 20 MG: 10 INJECTION INTRAVENOUS at 08:13

## 2023-07-19 RX ADMIN — CARBIDOPA AND LEVODOPA 1.5 TABLET: 25; 100 TABLET ORAL at 00:02

## 2023-07-19 RX ADMIN — Medication 10 ML: at 20:59

## 2023-07-19 RX ADMIN — SODIUM CHLORIDE 75 ML/HR: 9 INJECTION, SOLUTION INTRAVENOUS at 00:10

## 2023-07-19 RX ADMIN — ONDANSETRON 4 MG: 2 INJECTION INTRAMUSCULAR; INTRAVENOUS at 05:47

## 2023-07-19 RX ADMIN — CARBIDOPA AND LEVODOPA 1.5 TABLET: 25; 100 TABLET ORAL at 18:49

## 2023-07-19 RX ADMIN — POTASSIUM CHLORIDE 40 MEQ: 1500 TABLET, EXTENDED RELEASE ORAL at 08:13

## 2023-07-19 RX ADMIN — PANTOPRAZOLE SODIUM 40 MG: 40 INJECTION, POWDER, FOR SOLUTION INTRAVENOUS at 18:50

## 2023-07-19 RX ADMIN — CARBIDOPA AND LEVODOPA 1.5 TABLET: 25; 100 TABLET ORAL at 08:12

## 2023-07-19 RX ADMIN — ACETAMINOPHEN 650 MG: 325 TABLET ORAL at 05:53

## 2023-07-19 NOTE — PLAN OF CARE
Problem: Adult Inpatient Plan of Care  Goal: Plan of Care Review  Outcome: Ongoing, Progressing  Goal: Patient-Specific Goal (Individualized)  Outcome: Ongoing, Progressing  Goal: Absence of Hospital-Acquired Illness or Injury  Outcome: Ongoing, Progressing  Intervention: Identify and Manage Fall Risk  Intervention: Prevent Skin Injury  Intervention: Prevent and Manage VTE (Venous Thromboembolism) Risk  Intervention: Prevent Infection  Goal: Optimal Comfort and Wellbeing  Outcome: Ongoing, Progressing  Goal: Readiness for Transition of Care  Outcome: Ongoing, Progressing  Intervention: Mutually Develop Transition Plan     Problem: Adult Inpatient Plan of Care  Goal: Patient-Specific Goal (Individualized)  Outcome: Ongoing, Progressing     Problem: Adult Inpatient Plan of Care  Goal: Absence of Hospital-Acquired Illness or Injury  Outcome: Ongoing, Progressing  Intervention: Identify and Manage Fall Risk  Intervention: Prevent Skin Injury  Intervention: Prevent and Manage VTE (Venous Thromboembolism) Risk  Intervention: Prevent Infection     Problem: Adult Inpatient Plan of Care  Goal: Absence of Hospital-Acquired Illness or Injury  Intervention: Identify and Manage Fall Risk     Problem: Adult Inpatient Plan of Care  Goal: Absence of Hospital-Acquired Illness or Injury  Intervention: Prevent Skin Injury   Goal Outcome Evaluation:  Plan of Care Reviewed With: patient

## 2023-07-19 NOTE — PROGRESS NOTES
[facility] Medicine Services  PROGRESS NOTE    Patient Name: Nicolle Chavez  : 1940  MRN: [id number]    Date of Admission: 2023  Primary Care Physician: Caren Recinos MD    Subjective   Subjective     CC:  F/U chest pain, N/V    HPI:  Patient seen this morning. She was upset and confused this morning as the ER provider told her she would be having a heart cath but no one has told her when it will be. I explained that Cardiology has been consulted and patient will be told the plan once they have seen her this morning. Has not had any further chest pain. Still some nausea. Endorses a lot of stress in her life.     ROS:  Gen-no fevers, no chills  CV-no chest pain, no palpitations  Resp-no cough, no dyspnea  GI-+nausea, no abd pain       Objective   Objective     Vital Signs:   Temp:  [97.3 °F (36.3 °C)-99.3 °F (37.4 °C)] 97.3 °F (36.3 °C)  Heart Rate:  [50-71] 50  Resp:  [18-19] 18  BP: (143-184)/(64-99) 171/64     Physical Exam:  Gen-no acute distress  HENT-NCAT, mucous membranes moist  CV-RRR, S1 S2 normal, no m/r/g  Resp-CTAB, no wheezes or rales  Abd-soft, NT, ND, +BS  Ext-no edema  Neuro-A&Ox3, no focal deficits  Skin-no rashes  Psych-anxious      Results Reviewed:  LAB RESULTS:      Lab 23  0541 23  2316 23  1619   WBC  --   --  4.31   HEMOGLOBIN 11.1* 11.1* 10.8*   HEMATOCRIT 34.2 34.4 34.5   PLATELETS  --   --  112*   NEUTROS ABS  --   --  2.79   IMMATURE GRANS (ABS)  --   --  0.27*   LYMPHS ABS  --   --  0.58*   MONOS ABS  --   --  0.63   EOS ABS  --   --  0.03   MCV  --   --  94.8   LACTATE  --   --  0.8   D DIMER QUANT  --   --  0.29         Lab 23  0541 23  1847 23  6324   SODIUM 140  --  141   POTASSIUM 3.6  --  3.8   CHLORIDE 106  --  108*   CO2 24.0  --  25.0   ANION GAP 10.0  --  8.0   BUN 10  --  11   CREATININE 0.57  --  0.63   EGFR 89.7  --  87.6   GLUCOSE 80  --  110*   CALCIUM 8.4*  --  8.0*   HEMOGLOBIN A1C  --    --  4.60*   TSH  --  7.380*  --          Lab 07/18/23  1619   TOTAL PROTEIN 5.3*   ALBUMIN 3.8   GLOBULIN 1.5   ALT (SGPT) <5   AST (SGOT) 13   BILIRUBIN 0.4   ALK PHOS 71   LIPASE 52         Lab 07/19/23  0541 07/18/23  1847 07/18/23  1619   PROBNP  --   --  644.5   HSTROP T 17* 16* 14*             Lab 07/18/23  2247 07/18/23  1847   IRON  --  23*   IRON SATURATION (TSAT)  --  8*   TIBC  --  288*   TRANSFERRIN  --  193*   FERRITIN  --  101.20   FOLATE 8.65  --    VITAMIN B 12 390  --          Brief Urine Lab Results       None            Microbiology Results Abnormal       None            XR Chest 1 View    Result Date: 7/18/2023  XR CHEST 1 VW Date of Exam: 7/18/2023 4:13 PM EDT Indication: Chest Pain Triage Protocol Comparison: May 8, 2018 Findings: The lungs are clear. The heart and mediastinal contours appear normal. There is no pleural effusion. The pulmonary vasculature appears normal. The osseous structures appear intact.     Impression: Impression: No acute cardiopulmonary process. Electronically Signed: Jose Roberto Watts  7/18/2023 4:32 PM EDT  Workstation ID: [id number]    Aborted Cath Cath    Result Date: 7/19/2023  Aborted Case         Current medications:  Scheduled Meds:amLODIPine, 5 mg, Oral, Daily  aspirin, 81 mg, Oral, Daily  carbidopa-levodopa, 1.5 tablet, Oral, 4x Daily  cinacalcet, 60 mg, Oral, Daily  levothyroxine, 25 mcg, Oral, Daily  losartan, 50 mg, Oral, Q24H  nebivolol, 10 mg, Oral, Daily  pantoprazole, 40 mg, Intravenous, Daily  promethazine, 12.5 mg, Intravenous, Once  senna-docusate sodium, 2 tablet, Oral, BID  sodium chloride, 10 mL, Intravenous, Q12H      Continuous Infusions:   PRN Meds:.  acetaminophen **OR** acetaminophen **OR** acetaminophen    senna-docusate sodium **AND** polyethylene glycol **AND** bisacodyl **AND** bisacodyl    Calcium Replacement - Follow Nurse / BPA Driven Protocol    Magnesium Standard Dose Replacement - Follow Nurse / BPA Driven Protocol    melatonin     ondansetron **OR** ondansetron    Phosphorus Replacement - Follow Nurse / BPA Driven Protocol    Potassium Replacement - Follow Nurse / BPA Driven Protocol    sodium chloride    sodium chloride    sodium chloride    Assessment & Plan   Assessment & Plan     Active Hospital Problems    Diagnosis  POA    **Chest pain [R07.9]  Yes    Anemia of unknown etiology [D64.9]  Unknown    NSTEMI, initial episode of care [I21.4]  Unknown    Unstable angina [I20.0]  Unknown    Coronary artery disease involving native coronary artery without angina pectoris [I25.10]  Yes    Acquired hypothyroidism [E03.9]  Yes    Essential hypertension [I10]  Yes    Gastroesophageal reflux disease [K21.9]  Yes    Hyperlipidemia LDL goal <100 [E78.5]  Yes      Resolved Hospital Problems   No resolved problems to display.        Brief Hospital Course to date:  Nicolle Chavez is a 82 y.o. female with hx of CAD, HTN, HLD, hypothyroidism, s/p left parathyroidectomy, LE edema, PUD, PAC's, nonsustained atrial tachycardia, anxiety, and gout who presents to the ED via EMS for evaluation of left sided chest pain associated with severe headache and nausea and vomiting.     This patient's problems and plans were partially entered by my partner and updated as appropriate by me 07/19/23. Copied text in this note has been reviewed and is accurate as of today's date.     *All problems are new to me today.    Atypical chest pain   Hx of CAD  Hx of PUD  - Initial HS trop 14, repeat 16  - EKG unremarkable  - Cardiology consulted, Dr. Lynne had planned for left heart cath however on arrival to cath lab she was actively vomiting, without any chest pain--will cancel LHC and plan for GI evaluation as well as cardiac PET scan tomorrow for ischemic assessment   - IV Protonix and Pepcid (patient reports intolerance to PO Protonix due to diarrhea)  - Consult GI for further evaluation, may need EGD     Headache  - Tylenol PRN  - No falls, no neurologic deficits, not  on blood thinners - only aspirin     Vomiting  - Could be related to PUD  - s/p gentle fluids overnight  - Antiemetics as needed     Anemia  - Denies blood in stool or dark stools; no recent EGD/colonoscopy  - H&H have been stable since admission, discontinue serial monitoring     HTN  HLD  PAC's  - Continue amlodipine, nebivolol, irbesartan  - Hold HCTZ  - Hold PRN clonidine     GERD  PUD  - Patient not taking PO Protonix due to causing diarrhea  - Pepcid BID     Hypothyroidism  - Continue levothyroxine  - TSH elevated, will check free T4     Parkinson's disease  - Continue carbidopa/levodopa  - Continue sensipar     Unintentional weight loss  - Following with PCP    Expected Discharge Location and Transportation: home  Expected Discharge   Expected Discharge Date: 7/20/2023; Expected Discharge Time:      DVT prophylaxis:  Mechanical DVT prophylaxis orders are present.     AM-PAC 6 Clicks Score (PT): 21 (07/19/23 0812)    CODE STATUS:   Code Status and Medical Interventions:   Ordered at: 07/18/23 2059     Level Of Support Discussed With:    Patient     Code Status (Patient has no pulse and is not breathing):    CPR (Attempt to Resuscitate)     Medical Interventions (Patient has pulse or is breathing):    Full Support     Release to patient:    Routine Release       Natalia Best MD  07/19/23

## 2023-07-19 NOTE — CONSULTS
[facility] Gastroenterology Consult    Referring Provider: No ref. provider found    PCP: Caren Recinos MD    Reason for Consultation: PUD    History of present illness:    Nicolle Chavez is a 82 y.o. female, PMH includes GERD, HL, HTN, CAD, CKD, thyroid disease, is admitted via ED yesterday for evaluation of chest pain.     She notes onset of left sided heaviness, associated with nausea and bilious emesis that started a few days ago. She notes intermittent esophageal dysphagia, which she attributes to her goiter. She endorses nonproductive cough in evenings. She has been intolerant to multiple PPIs in the past due to diarrhea. She is not taking antacid medications at home. She has lost about 30lb over the last year.     Pt was evaluated by cardiology, who recommend LHC, but pt was actively vomiting and denied chest pain upon arrival to cath lab. LHC was cancelled and cardiac PET is planned for tomorrow for ischemic assessment.      Patient denies associated fever, chills, abdominal pain, diarrhea, constipation, hematemesis, hematochezia, melena, bloating, dysuria, jaundice or bruising.    Patient denies personal or FHx of H Pylori, pancreatitis, colitis, Celiac disease, UC, Crohn's disease, colon cancers. Father with stomach cancer in his 40s. Pt denies tobacco, illicit substance or NSAID use. Rare EtOH use.     EGD 2019 with Dr. Spears. Peptic stricture at distal esophagus with erosive esophagitis. Small hiatal hernia. CRE balloon dilation to 18mm. Small erosions in antrum, otherwise normal stomach. Normal duodenum.     Allergies:  Pravachol [pravastatin sodium], Statins, Zocor [simvastatin], Aspirin, Bactrim [sulfamethoxazole-trimethoprim], Crestor [rosuvastatin calcium], and Lipitor [atorvastatin]    Scheduled Meds:  amLODIPine, 5 mg, Oral, Daily  aspirin, 81 mg, Oral, Daily  carbidopa-levodopa, 1.5 tablet, Oral, 4x Daily  cinacalcet, 60 mg, Oral, Daily  levothyroxine, 25 mcg, Oral, Daily  losartan, 50 mg,  Oral, Q24H  nebivolol, 10 mg, Oral, Daily  pantoprazole, 40 mg, Intravenous, Daily  promethazine, 12.5 mg, Intravenous, Once  senna-docusate sodium, 2 tablet, Oral, BID  sodium chloride, 10 mL, Intravenous, Q12H         Infusions:       PRN Meds:    acetaminophen **OR** acetaminophen **OR** acetaminophen    senna-docusate sodium **AND** polyethylene glycol **AND** bisacodyl **AND** bisacodyl    Calcium Replacement - Follow Nurse / BPA Driven Protocol    Magnesium Standard Dose Replacement - Follow Nurse / BPA Driven Protocol    melatonin    ondansetron **OR** ondansetron    Phosphorus Replacement - Follow Nurse / BPA Driven Protocol    Potassium Replacement - Follow Nurse / BPA Driven Protocol    sodium chloride    sodium chloride    sodium chloride    Home Meds:  Medications Prior to Admission   Medication Sig Dispense Refill Last Dose    Aflibercept (Eylea) 2 MG/0.05ML solution by Intravitreal route As Needed.   7/18/2023    aspirin 81 MG tablet Take 1 tablet by mouth Daily.   7/18/2023    cinacalcet (SENSIPAR) 60 MG tablet Take 1 tablet by mouth Daily.   7/18/2023    levothyroxine (Synthroid) 25 MCG tablet Take 1 tablet by mouth Daily. 90 tablet 1 7/18/2023    amLODIPine (NORVASC) 5 MG tablet TAKE 1 TABLET BY MOUTH EVERY DAY 90 tablet 0 Unknown    Azilsartan Medoxomil (Edarbi) 40 MG tablet Oral for 90       carbidopa-levodopa (Sinemet)  MG per tablet Take 1.5 tablets by mouth 4 (Four) Times a Day for 30 days. 180 tablet 6     chlorthalidone (HYGROTON) 25 MG tablet TAKE 1 TABLET BY MOUTH EVERY DAY Oral for 30   Unknown    cloNIDine (Catapres) 0.1 MG tablet Take 1 tablet by mouth Every 8 (Eight) Hours As Needed for High Blood Pressure (BP >180/100 mm Hg). 10 tablet 5     denosumab (Prolia) 60 MG/ML solution prefilled syringe syringe Inject 1 mL under the skin into the appropriate area as directed 1 (One) Time. 2 TIMES A YEAR       hydroCHLOROthiazide (MICROZIDE) 12.5 MG capsule TAKE 1 CAPSULE BY MOUTH EVERY  DAY Oral for 90   Unknown    irbesartan (AVAPRO) 150 MG tablet TAKE 1 TABLET BY MOUTH EVERY DAY 90 tablet 1 Unknown    nebivolol (BYSTOLIC) 10 MG tablet TAKE 1 TABLET BY MOUTH EVERY DAY 90 tablet 0 Unknown    nitroglycerin (NITROSTAT) 0.4 MG SL tablet Place 1 tablet under the tongue Every 5 (Five) Minutes As Needed for Chest Pain. Take no more than 3 doses in 15 minutes. 25 tablet 5 Unknown    pantoprazole (PROTONIX) 40 MG EC tablet Take 1 tablet by mouth As Needed. Not currently taking due to stomach issues.   Unknown    PREMARIN 0.625 MG/GM vaginal cream Insert  into the vagina 1 (One) Time Per Week.   Unknown    Repatha SureClick solution auto-injector SureClick injection Inject 1 mL under the skin into the appropriate area as directed Every 14 (Fourteen) Days. 6 mL 3 Unknown       ROS: Review of Systems   Constitutional:  Positive for unexpected weight change (loss, 30lb). Negative for chills, diaphoresis and fatigue.   HENT:  Positive for trouble swallowing. Negative for drooling, facial swelling, mouth sores, nosebleeds, rhinorrhea, sore throat, tinnitus and voice change.    Respiratory:  Positive for cough. Negative for chest tightness and shortness of breath.    Cardiovascular:  Positive for chest pain. Negative for palpitations and leg swelling.   Gastrointestinal:  Positive for nausea and vomiting. Negative for abdominal distention, abdominal pain, blood in stool, constipation and diarrhea.   Genitourinary:  Negative for dysuria, flank pain and hematuria.   Musculoskeletal:  Negative for arthralgias, joint swelling and myalgias.   Skin:  Negative for color change, pallor and rash.   Neurological:  Negative for dizziness, tremors, syncope, weakness and light-headedness.   Psychiatric/Behavioral:  Negative for confusion and hallucinations.    All other systems reviewed and are negative.    PAST MED HX:  Past Medical History:   Diagnosis Date    Abdominal pain     Last Impression: 26 May 2015  Improving. The  patient states she had a history of lower   abdominal pain after taking antibiotics for UTI, but this has improved.  Sanjiv Mak (Gastroenterology)    Abnormal weight gain     Anxiety 12/12/2016    Arthritis of neck     Cataract     Cataract 2014    Cataracts, bilateral     CHF (congestive heart failure)     Congenital heart disease     Coronary artery disease 12/12/2016    Field ST-elevation myocardial infarction, 11/25/2015, with associated chest pain, jaw pain, diaphoresis, and dyspnea; inferior-anterior ST elevation by outside telemetry strips.  Emergent cardiac catheterization, Dr. Isael Rosenbaum:  Status post drug-eluting stent to the proximal right coronary artery using a 2.25 x 23 mm Xience Alpine stent.  Ejection fraction 55% to 60%.     CTS (carpal tunnel syndrome)     Dysphagia 08/15/2016    Last Impression: 26 May 2015  History of intermittent dysphagia symptoms. Differentials     include esophageal dysmotility, Schatzki's ring.  Sanjiv Mak (Gastroenterology)      Fracture of wrist     Gout     2012    Gout 2012    H/O bone density study     Headache     Headache     High cholesterol     1995    Hyperparathyroidism     2009    Hypertension     Hypothyroidism     Low back strain     Myalgia and myositis 08/15/2016    Myocardial infarction     Neck strain     Palpitations 09/25/2017    Parathyroid disorder 12/12/2016    with a history of a left parathyroid parathyroidectomy.     Peptic ulcer disease     and bleeding ulcer approximately 12 years ago; incomplete database.     Rotator cuff syndrome 01/2021    Scoliosis        PAST SURG HX:  Past Surgical History:   Procedure Laterality Date    APPENDECTOMY      BREAST BIOPSY Right 1995    Ultrasound guided needle biopsy    CARDIAC CATHETERIZATION  06/2022    CAROTID STENT      COLONOSCOPY      CORONARY STENT PLACEMENT      EYE SURGERY Left     HYSTERECTOMY Bilateral 1978    OOPHORECTOMY      OTHER SURGICAL HISTORY      Ulcer repair     "PARATHYROIDECTOMY Left     WRIST SURGERY  2012       FAM HX:  Family History   Problem Relation Age of Onset    Anemia Mother     Arthritis Mother     Heart disease Mother     Hypertension Mother     Stroke Mother     Broken bones Mother     Osteoporosis Mother     Heart attack Mother     Cancer Father         stomach    Breast cancer Neg Hx     Ovarian cancer Neg Hx     Colon cancer Neg Hx     Endometrial cancer Neg Hx        SOC HX:  Social History     Socioeconomic History    Marital status:    Tobacco Use    Smoking status: Never    Smokeless tobacco: Never   Vaping Use    Vaping Use: Never used   Substance and Sexual Activity    Alcohol use: No    Drug use: No    Sexual activity: Not Currently     Partners: Male       PHYSICAL EXAM  /94 (BP Location: Left arm, Patient Position: Lying)   Pulse 50   Temp 98.3 °F (36.8 °C) (Oral)   Resp 18   Ht 154.9 cm (60.98\")   Wt 56.5 kg (124 lb 9 oz)   SpO2 97%   BMI 23.55 kg/m²   Wt Readings from Last 3 Encounters:   07/19/23 56.5 kg (124 lb 9 oz)   02/13/23 51.7 kg (114 lb)   01/24/23 51.7 kg (114 lb)   ,body mass index is 23.55 kg/m².  Physical Exam  Vitals and nursing note reviewed.   Constitutional:       Appearance: Normal appearance. She is normal weight. She is not ill-appearing or diaphoretic.   HENT:      Head: Normocephalic and atraumatic.      Right Ear: External ear normal.      Left Ear: External ear normal.      Nose: Nose normal.      Mouth/Throat:      Mouth: Mucous membranes are moist.      Pharynx: Oropharynx is clear.   Eyes:      Conjunctiva/sclera: Conjunctivae normal.      Pupils: Pupils are equal, round, and reactive to light.   Neck:      Thyroid: No thyromegaly.   Cardiovascular:      Rate and Rhythm: Normal rate and regular rhythm.      Pulses: Normal pulses.      Heart sounds: Normal heart sounds.   Pulmonary:      Effort: Pulmonary effort is normal.      Breath sounds: Normal breath sounds.   Abdominal:      General: Abdomen is " flat. Bowel sounds are normal. There is no distension.      Tenderness: There is no abdominal tenderness.   Musculoskeletal:         General: Normal range of motion.      Cervical back: Normal range of motion and neck supple.      Right lower leg: No edema.      Left lower leg: No edema.   Skin:     General: Skin is warm and dry.   Neurological:      General: No focal deficit present.      Mental Status: She is oriented to person, place, and time.   Psychiatric:         Mood and Affect: Mood normal.       Results Review:   I reviewed the patient's new clinical results.  I reviewed the patient's new imaging results and agree with the interpretation.  I reviewed the patient's other test results and agree with the interpretation    Lab Results   Component Value Date    WBC 4.31 07/18/2023    HGB 11.1 (L) 07/19/2023    HGB 11.1 (L) 07/18/2023    HGB 10.8 (L) 07/18/2023    HCT 34.2 07/19/2023    MCV 94.8 07/18/2023     (L) 07/18/2023       No results found for: INR    Lab Results   Component Value Date    GLUCOSE 80 07/19/2023    BUN 10 07/19/2023    CREATININE 0.57 07/19/2023    EGFRIFNONA >60 06/03/2022    EGFRIFAFRI >60 06/03/2022    BCR 17.5 07/19/2023     07/19/2023    K 3.6 07/19/2023    CO2 24.0 07/19/2023    CALCIUM 8.4 (L) 07/19/2023    PROTENTOTREF 6.2 10/14/2020    ALBUMIN 3.8 07/18/2023    ALKPHOS 71 07/18/2023    BILITOT 0.4 07/18/2023    ALT <5 07/18/2023    AST 13 07/18/2023       ASSESSMENTS/PLANS    GERD with h/o erosive esophagitis  Nausea and vomiting  Intolerance to PPI  FHx gastric cancer  Weight loss   - consider EGD this admission if cleared from a cardiac standpoint   - continue pantoprazole, famotidine as ordered   - consider carafate if intolerant to PPI this admission   - continue cardiac diet    I discussed the patient's findings and my recommendations with patient. Thank you very kindly for this consultation. Will continue to follow during this hospitalization.      Tuyet  KOBE Burnett  07/19/23  15:23 EDT

## 2023-07-19 NOTE — CASE MANAGEMENT/SOCIAL WORK
Discharge Planning Assessment  [facility]     Patient Name: Nicolle Chavez  MRN: [id number]  Today's Date: 7/19/2023    Admit Date: 7/18/2023    Plan: Home at DC   Discharge Needs Assessment       Row Name 07/19/23 1054       Living Environment    People in Home spouse    Current Living Arrangements home    Living Arrangement Comments She is the caregiver of her spouse. Her children live out of town. Has support from friends.       Discharge Needs Assessment    Equipment Currently Used at Home none    Equipment Needed After Discharge none    Discharge Coordination/Progress Denies current use of DME. Goes to outpt PT at [facility] in [place].                   Discharge Plan       Row Name 07/19/23 1056       Plan    Plan Home at DC    Patient/Family in Agreement with Plan yes    Plan Comments I spoke with the pt. She is unsure of any DC needs at this time.    Final Discharge Disposition Code 01 - home or self-care      Row Name 07/19/23 0832       Plan    Final Discharge Disposition Code 01 - home or self-care                  Continued Care and Services - Admitted Since 7/18/2023    Coordination has not been started for this encounter.       Expected Discharge Date and Time       Expected Discharge Date Expected Discharge Time    Jul 20, 2023            Demographic Summary    No documentation.                  Functional Status       Row Name 07/19/23 1054       Functional Status    Usual Activity Tolerance good       Functional Status, IADL    Medications independent    Meal Preparation independent    Housekeeping independent    Laundry independent    Shopping independent                   Psychosocial    No documentation.                  Abuse/Neglect    No documentation.                  Legal    No documentation.                  Substance Abuse    No documentation.                  Patient Forms    No documentation.                     Philomena Delgado RN

## 2023-07-19 NOTE — H&P
[facility] Medicine Services  HISTORY AND PHYSICAL    Patient Name: Nicolle Chavez  : 1940  MRN: [id number]  Primary Care Physician: Caren Recinos MD  Date of admission: 2023    Subjective   Subjective     Chief Complaint:  Chest pain    HPI:  Nicolle Chavez is a 82 y.o. female with hx of CAD, HTN, HLD, hypothyroidism, s/p left parathyroidectomy, LE edema, PUD, PAC's, nonsustained atrial tachycardia, anxiety and gout who presents to the ED via EMS for evaluation of left sided chest pain w/o radiation. The pain began suddenly ~ 2pm while she was sitting down sorting papers; it was accompanied by a severe headache as well as nausea and vomiting (x 7 episodes that began after the chest pain). She reports similar sx w/ her prior MI in . She reports an episode of chest pain a few days ago that occurred w/ exertion and lasted a few hours. She follows w/ Dr. Qiu, cardiology and saw him in January. Of note, patient reports hx of PUD and is prescribed pantoprazole but is not able to tolerate this d/t diarrhea. She is also taking aspirin daily and reports unintentional weight loss over the past year of ~ 30 lbs. Currently her chest is not hurting and her headache is only a dull ache.    Review of Systems   Constitutional:  Positive for unexpected weight change. Negative for activity change, appetite change, chills and fever.   Cardiovascular:  Positive for chest pain. Negative for palpitations.   Gastrointestinal:  Positive for nausea and vomiting.   Neurological:  Positive for headaches. Negative for dizziness and syncope.   All other systems reviewed and are negative.         Personal History     Past Medical History:   Diagnosis Date    Abdominal pain     Last Impression: 26 May 2015  Improving. The patient states she had a history of lower   abdominal pain after taking antibiotics for UTI, but this has improved.  Sanjiv Mak (Gastroenterology)     Abnormal weight gain     Anxiety 12/12/2016    Arthritis of neck     Cataract     Cataract 2014    Cataracts, bilateral     CHF (congestive heart failure)     Congenital heart disease     Coronary artery disease 12/12/2016    Field ST-elevation myocardial infarction, 11/25/2015, with associated chest pain, jaw pain, diaphoresis, and dyspnea; inferior-anterior ST elevation by outside telemetry strips.  Emergent cardiac catheterization, Dr. Isael Rosenbaum:  Status post drug-eluting stent to the proximal right coronary artery using a 2.25 x 23 mm Xience Alpine stent.  Ejection fraction 55% to 60%.     CTS (carpal tunnel syndrome)     Dysphagia 08/15/2016    Last Impression: 26 May 2015  History of intermittent dysphagia symptoms. Differentials     include esophageal dysmotility, Schatzki's ring.  Sanjiv Mak (Gastroenterology)      Fracture of wrist     Gout     2012    Gout 2012    H/O bone density study     Headache     Headache     High cholesterol     1995    Hyperparathyroidism     2009    Hypertension     Hypothyroidism     Low back strain     Myalgia and myositis 08/15/2016    Myocardial infarction     Neck strain     Palpitations 09/25/2017    Parathyroid disorder 12/12/2016    with a history of a left parathyroid parathyroidectomy.     Peptic ulcer disease     and bleeding ulcer approximately 12 years ago; incomplete database.     Rotator cuff syndrome 01/2021    Scoliosis              Past Surgical History:   Procedure Laterality Date    APPENDECTOMY      BREAST BIOPSY Right 1995    Ultrasound guided needle biopsy    CARDIAC CATHETERIZATION  06/2022    CAROTID STENT      COLONOSCOPY      CORONARY STENT PLACEMENT      EYE SURGERY Left     HYSTERECTOMY Bilateral 1978    OOPHORECTOMY      OTHER SURGICAL HISTORY      Ulcer repair    PARATHYROIDECTOMY Left     WRIST SURGERY  2012       Family History:  family history includes Anemia in her mother; Arthritis in her mother; Broken bones in her mother; Cancer  in her father; Heart attack in her mother; Heart disease in her mother; Hypertension in her mother; Osteoporosis in her mother; Stroke in her mother.     Social History:  reports that she has never smoked. She has never used smokeless tobacco. She reports that she does not drink alcohol and does not use drugs.  Social History     Social History Narrative    Not on file       Medications:  Aflibercept, Azilsartan Medoxomil, Estrogens Conjugated, Evolocumab, amLODIPine, aspirin, carbidopa-levodopa, chlorthalidone, cinacalcet, cloNIDine, denosumab, hydroCHLOROthiazide, irbesartan, levothyroxine, nebivolol, nitroglycerin, and pantoprazole    Allergies   Allergen Reactions    Pravachol [Pravastatin Sodium] Myalgia    Statins Myalgia    Zocor [Simvastatin] Myalgia    Aspirin Rash    Bactrim [Sulfamethoxazole-Trimethoprim] Rash    Crestor [Rosuvastatin Calcium] Myalgia    Lipitor [Atorvastatin] Myalgia       Objective   Objective     Vital Signs:   Temp:  [97.8 °F (36.6 °C)] 97.8 °F (36.6 °C)  Heart Rate:  [50-61] 60  Resp:  [19] 19  BP: (143-169)/(65-99) 169/73    Physical Exam  Constitutional:       General: She is not in acute distress.     Appearance: She is underweight.   HENT:      Head: Normocephalic and atraumatic.      Nose: Nose normal.      Mouth/Throat:      Pharynx: Oropharynx is clear.   Eyes:      Extraocular Movements: Extraocular movements intact.      Conjunctiva/sclera: Conjunctivae normal.      Pupils: Pupils are equal, round, and reactive to light.   Cardiovascular:      Rate and Rhythm: Normal rate and regular rhythm.      Pulses: Normal pulses.      Heart sounds: Normal heart sounds. No murmur heard.  Pulmonary:      Effort: Pulmonary effort is normal.      Breath sounds: Normal breath sounds.   Abdominal:      General: Bowel sounds are normal. There is no distension.      Palpations: Abdomen is soft.      Tenderness: There is no abdominal tenderness.   Musculoskeletal:         General: No swelling.  Normal range of motion.      Cervical back: Normal range of motion and neck supple.   Skin:     General: Skin is warm and dry.      Capillary Refill: Capillary refill takes less than 2 seconds.      Findings: No rash.   Neurological:      Mental Status: She is alert and oriented to person, place, and time.      Cranial Nerves: No cranial nerve deficit.   Psychiatric:         Mood and Affect: Mood normal.         Behavior: Behavior normal.          Result Review:  I have personally reviewed the results from the time of this admission to 7/18/2023 21:00 EDT and agree with these findings:  [x]  Laboratory list / accordion  []  Microbiology  [x]  Radiology  [x]  EKG/Telemetry   []  Cardiology/Vascular   []  Pathology  [x]  Old records  []  Other:  Most notable findings include:     LAB RESULTS:      Lab 07/18/23  1619   WBC 4.31   HEMOGLOBIN 10.8*   HEMATOCRIT 34.5   PLATELETS 112*   NEUTROS ABS 2.79   IMMATURE GRANS (ABS) 0.27*   LYMPHS ABS 0.58*   MONOS ABS 0.63   EOS ABS 0.03   MCV 94.8   D DIMER QUANT 0.29         Lab 07/18/23  1619   SODIUM 141   POTASSIUM 3.8   CHLORIDE 108*   CO2 25.0   ANION GAP 8.0   BUN 11   CREATININE 0.63   EGFR 87.6   GLUCOSE 110*   CALCIUM 8.0*         Lab 07/18/23  1619   TOTAL PROTEIN 5.3*   ALBUMIN 3.8   GLOBULIN 1.5   ALT (SGPT) <5   AST (SGOT) 13   BILIRUBIN 0.4   ALK PHOS 71   LIPASE 52         Lab 07/18/23  1847 07/18/23  1619   PROBNP  --  644.5   HSTROP T 16* 14*                 Brief Urine Lab Results       None          Microbiology Results (last 10 days)       ** No results found for the last 240 hours. **            XR Chest 1 View    Result Date: 7/18/2023  XR CHEST 1 VW Date of Exam: 7/18/2023 4:13 PM EDT Indication: Chest Pain Triage Protocol Comparison: May 8, 2018 Findings: The lungs are clear. The heart and mediastinal contours appear normal. There is no pleural effusion. The pulmonary vasculature appears normal. The osseous structures appear intact.     Impression:  Impression: No acute cardiopulmonary process. Electronically Signed: Jose Roberto Watts  7/18/2023 4:32 PM EDT  Workstation ID: [id number]         Assessment & Plan   Assessment & Plan       Chest pain    Gastroesophageal reflux disease    Hyperlipidemia LDL goal <100    Essential hypertension    Acquired hypothyroidism    Coronary artery disease involving native coronary artery without angina pectoris    Anemia of unknown etiology    Chest pain   - initial HS trop 14, repeat 16  - repeat troponin in am  - EKG in am  - ECHO in am  - cardiology consult in am, Dr. Qiu  - NPO after MN  - NTG IV prn chest pain  - aspirin daily  - was given lovenox in ED, hold further dosing as no elevation in troponin delta/no chest pain  - add d dimer    Headache  - tylenol PRN  - no falls, no neurologic deficits, not on blood thinners - only aspirin    Vomiting  - gentle fluids overnight  - zofran prn  - electrolyte replacement prn    Anemia  - denies blood in stool or dark stools; no recent EGD/colonoscopy  - add pepcid IV BID for now, if occult stool + will switch to protonix IV (pt reports intolerance to oral pantoprazole, causes diarrhea)  - anemia labs  - occult stool  - trend H/H  - type/screen w/ next lab draw    HTN  HLD  PAC's  - continue amlodipine, nebivolol, irbesartan  - hold HCTZ  - hold prn clonidine    GERD  - hold pantoprazole, not taking d/t reports causing diarrhea  - pepcid BID    Hypothyroidism  - continue levothyroxine  - check TSH    Parkinson's Dz  - continue carbidopa/levodopa  - continue sensipar    Unintentional weight loss  - following w/ PCP    DVT prophylaxis:  SCDS    CODE STATUS:    Level Of Support Discussed With: Patient  Code Status (Patient has no pulse and is not breathing): CPR (Attempt to Resuscitate)  Medical Interventions (Patient has pulse or is breathing): Full Support  Release to patient: Routine Release      Expected Discharge     Expected Discharge Date: 7/20/2023; Expected Discharge Time:      Total time spent: 60 minutes  Time spent includes time reviewing chart, face-to-face time, counseling patient/family/caregiver, ordering medications/tests/procedures, communicating with other health care professionals, documenting clinical information in the electronic health record, and coordination of care.      Signature: Electronically signed by CHUCHO Muñoz, 07/18/23, 8:25 PM EDT

## 2023-07-19 NOTE — CONSULTS
Date of Hospital Visit: 23  Encounter Provider: Lilia Lyles PA-C  Place of Service: [facility]  Patient Name: Nicolle Chavez  :1940  Referral Provider: No ref. provider found  Primary Care Provider: Caren Recinos MD    Chief complaint/Reason for Consultation: chest pain    Problem List:  Coronary artery disease  STEMI 2015: EMMA to the RCA, EF 55-60%  MPS 2017: Breast attenuation GI artifact present, no evidence of ischemia, normal EF  Chronic diastolic CHF  Hypertension  Dyslipidemia  Family history of CAD    History of Present Illness:  Patient is an 82-year-old female with the above past cardiac history who we are asked to see in consultation today for chest pain and mildly elevated troponin.  She is primarily followed by Dr. Qiu.  She presented to the ED via EMS yesterday with complaints of left-sided chest pain without radiation.  She reports that the pain began around 2 PM while she was sitting down sorting papers.  She did have associated headache as well as nausea and vomiting.  The symptoms are similar to those with her prior MI.  She does state that she had an episode of chest pain a few days ago that occurred with exertion and lasted a few hours.  In the ED, she was found with mildly elevated troponin (14, 16, 17).  ECG did not show any acute ST-T changes. She was admitted to the hospital for further eval and management.     Currently, patient is chest pain-free.  She has had intermittent episodes of chest discomfort overnight.  She has had nausea and a couple episodes of vomiting overnight as well.  She is agreeable to proceed with heart catheterization.    Due to patient's repeated complaints of headache which reminds her of what she had experienced at the time of her MI she was of the opinion that all of her symptoms were cardiac and after discussion with Lilia Lyles PA-C first felt that cardiac catheter study is indicated for further evaluation  however upon arrival to the Cath Lab patient was noted to be nauseous and vomiting.  She reported that she had vomited several times and had only a little chest pain prior to admission but was not experiencing any chest pain at this time.  Her only concern has been intermittent headache which she relates to her heart attack.      Past Medical History:   Diagnosis Date    Abdominal pain     Last Impression: 26 May 2015  Improving. The patient states she had a history of lower   abdominal pain after taking antibiotics for UTI, but this has improved.  Sanjiv Mak (Gastroenterology)    Abnormal weight gain     Anxiety 12/12/2016    Arthritis of neck     Cataract     Cataract 2014    Cataracts, bilateral     CHF (congestive heart failure)     Congenital heart disease     Coronary artery disease 12/12/2016    Field ST-elevation myocardial infarction, 11/25/2015, with associated chest pain, jaw pain, diaphoresis, and dyspnea; inferior-anterior ST elevation by outside telemetry strips.  Emergent cardiac catheterization, Dr. Isael Rosenbaum:  Status post drug-eluting stent to the proximal right coronary artery using a 2.25 x 23 mm Xience Alpine stent.  Ejection fraction 55% to 60%.     CTS (carpal tunnel syndrome)     Dysphagia 08/15/2016    Last Impression: 26 May 2015  History of intermittent dysphagia symptoms. Differentials     include esophageal dysmotility, Schatzki's ring.  Sanjiv Mak (Gastroenterology)      Fracture of wrist     Gout     2012    Gout 2012    H/O bone density study     Headache     Headache     High cholesterol     1995    Hyperparathyroidism     2009    Hypertension     Hypothyroidism     Low back strain     Myalgia and myositis 08/15/2016    Myocardial infarction     Neck strain     Palpitations 09/25/2017    Parathyroid disorder 12/12/2016    with a history of a left parathyroid parathyroidectomy.     Peptic ulcer disease     and bleeding ulcer approximately 12 years ago; incomplete  database.     Rotator cuff syndrome 01/2021    Scoliosis        Past Surgical History:   Procedure Laterality Date    APPENDECTOMY      BREAST BIOPSY Right 1995    Ultrasound guided needle biopsy    CARDIAC CATHETERIZATION  06/2022    CAROTID STENT      COLONOSCOPY      CORONARY STENT PLACEMENT      EYE SURGERY Left     HYSTERECTOMY Bilateral 1978    OOPHORECTOMY      OTHER SURGICAL HISTORY      Ulcer repair    PARATHYROIDECTOMY Left     WRIST SURGERY  2012       Medications Prior to Admission   Medication Sig Dispense Refill Last Dose    Aflibercept (Eylea) 2 MG/0.05ML solution by Intravitreal route As Needed.   7/18/2023    aspirin 81 MG tablet Take 1 tablet by mouth Daily.   7/18/2023    cinacalcet (SENSIPAR) 60 MG tablet Take 1 tablet by mouth Daily.   7/18/2023    levothyroxine (Synthroid) 25 MCG tablet Take 1 tablet by mouth Daily. 90 tablet 1 7/18/2023    amLODIPine (NORVASC) 5 MG tablet TAKE 1 TABLET BY MOUTH EVERY DAY 90 tablet 0 Unknown    Azilsartan Medoxomil (Edarbi) 40 MG tablet Oral for 90       carbidopa-levodopa (Sinemet)  MG per tablet Take 1.5 tablets by mouth 4 (Four) Times a Day for 30 days. 180 tablet 6     chlorthalidone (HYGROTON) 25 MG tablet TAKE 1 TABLET BY MOUTH EVERY DAY Oral for 30   Unknown    cloNIDine (Catapres) 0.1 MG tablet Take 1 tablet by mouth Every 8 (Eight) Hours As Needed for High Blood Pressure (BP >180/100 mm Hg). 10 tablet 5     denosumab (Prolia) 60 MG/ML solution prefilled syringe syringe Inject 1 mL under the skin into the appropriate area as directed 1 (One) Time. 2 TIMES A YEAR       hydroCHLOROthiazide (MICROZIDE) 12.5 MG capsule TAKE 1 CAPSULE BY MOUTH EVERY DAY Oral for 90   Unknown    irbesartan (AVAPRO) 150 MG tablet TAKE 1 TABLET BY MOUTH EVERY DAY 90 tablet 1 Unknown    nebivolol (BYSTOLIC) 10 MG tablet TAKE 1 TABLET BY MOUTH EVERY DAY 90 tablet 0 Unknown    nitroglycerin (NITROSTAT) 0.4 MG SL tablet Place 1 tablet under the tongue Every 5 (Five) Minutes  "As Needed for Chest Pain. Take no more than 3 doses in 15 minutes. 25 tablet 5 Unknown    pantoprazole (PROTONIX) 40 MG EC tablet Take 1 tablet by mouth As Needed. Not currently taking due to stomach issues.   Unknown    PREMARIN 0.625 MG/GM vaginal cream Insert  into the vagina 1 (One) Time Per Week.   Unknown    Repatha SureClick solution auto-injector SureClick injection Inject 1 mL under the skin into the appropriate area as directed Every 14 (Fourteen) Days. 6 mL 3 Unknown       Social History     Socioeconomic History    Marital status:    Tobacco Use    Smoking status: Never    Smokeless tobacco: Never   Vaping Use    Vaping Use: Never used   Substance and Sexual Activity    Alcohol use: No    Drug use: No    Sexual activity: Not Currently     Partners: Male       Family History   Problem Relation Age of Onset    Anemia Mother     Arthritis Mother     Heart disease Mother     Hypertension Mother     Stroke Mother     Broken bones Mother     Osteoporosis Mother     Heart attack Mother     Cancer Father         stomach    Breast cancer Neg Hx     Ovarian cancer Neg Hx     Colon cancer Neg Hx     Endometrial cancer Neg Hx        REVIEW OF SYSTEMS:     12 point ROS was performed and is Negative except as outlined in HPI     Objective:     Vitals:    07/18/23 2043 07/19/23 0028 07/19/23 0425 07/19/23 0744   BP: 160/76 174/65 (!) 184/86 156/69   BP Location: Left arm Right arm Right arm Right arm   Patient Position: Sitting Lying Lying Lying   Pulse: 71 51 50 50   Resp: 18 18 18 18   Temp: 98 °F (36.7 °C) 98.1 °F (36.7 °C) 97.7 °F (36.5 °C) 99.3 °F (37.4 °C)   TempSrc: Oral Oral Oral Axillary   SpO2: 95% 98% 98% 97%   Weight: 48.5 kg (106 lb 14.4 oz)  56.5 kg (124 lb 9.6 oz)    Height:         Body mass index is 23.54 kg/m².  Flowsheet Rows      Flowsheet Row First Filed Value   Admission Height 154.9 cm (61\") Documented at 07/18/2023 1617   Admission Weight 49.9 kg (110 lb) Documented at 07/18/2023 1617 "            Physical Exam   Physical Exam   General: No acute distress, well-developed and well-nourished.    Skin: Skin is warm and dry. No obvious cyanosis, erythema or pallor.   HEENT: Atraumatic, normocephalic, no conjunctival pallor, no scleral icterus.   Neck: Supple, no JVD. Normal carotid upstrokes, no bruits.    Chest: No respiratory distress. No chest wall tenderness. Breath sounds are normal. No wheezes,  rhonchi or rales.  Cardiovascular: Normal S1 and S2, no murmur, gallop or rub. PMI is not displaced.    Pulses: Radial and pedal pulses are 2+ and symmetric.    Abdomen: Soft, nontender, normal bowel sounds.   Musculoskeletal/Extremities: No clubbing, cyanosis or edema. No gross deformity.   Neurological: Alert and oriented to person, place, and time, no gross focal deficits.   Psychiatric: Normal mood and affect. Speech and behavior is normal.      Lab Review:                Results from last 7 days   Lab Units 07/19/23  0541   SODIUM mmol/L 140   POTASSIUM mmol/L 3.6   CHLORIDE mmol/L 106   CO2 mmol/L 24.0   BUN mg/dL 10   CREATININE mg/dL 0.57   GLUCOSE mg/dL 80   CALCIUM mg/dL 8.4*     Results from last 7 days   Lab Units 07/19/23  0541 07/18/23  1847 07/18/23  1619   HSTROP T ng/L 17* 16* 14*     Results from last 7 days   Lab Units 07/19/23  0541 07/18/23  2316 07/18/23  1619   WBC 10*3/mm3  --   --  4.31   HEMOGLOBIN g/dL 11.1*   < > 10.8*   HEMATOCRIT % 34.2   < > 34.5   PLATELETS 10*3/mm3  --   --  112*    < > = values in this interval not displayed.                 @LABRCNT(bnp)@  Imaging Results (Last 24 Hours)       Procedure Component Value Units Date/Time    XR Chest 1 View [[id number]] Collected: 07/18/23 1629     Updated: 07/18/23 1635    Narrative:      XR CHEST 1 VW    Date of Exam: 7/18/2023 4:13 PM EDT    Indication: Chest Pain Triage Protocol    Comparison: May 8, 2018    Findings:  The lungs are clear. The heart and mediastinal contours appear normal. There is no pleural effusion. The  pulmonary vasculature appears normal. The osseous structures appear intact.      Impression:      Impression:  No acute cardiopulmonary process.      Electronically Signed: Jose Roberto Watts    7/18/2023 4:32 PM EDT    Workstation ID: [id number]             EKG: sinus bradycardia with no acute ST-T changes, within normal limits      Assessment:   Chest pain with gray zone troponin without current angina.    Has had nausea/vomiting and headache.    STEMI in 2015 with EMMA to RCA  Hypertension  Dyslipidemia    Plan:   We we had decided to proceed to cardiac ablation study however upon arrival to the Cath Lab patient was noted to be nauseous and vomiting, it is felt that her symptoms are probably noncardiac and gray zone troponin is most probably due to type II mechanism.    At this time I do not think risk/benefits of proceeding to invasive testing in this elderly patient is favorable considering negative cardiac work-up so far.    We will proceed to evaluation for GI etiology of symptoms after discussion with hospital medicine.  Add Protonix.  Continue current medical management.  Cardiac PET scan tomorrow for ischemic assessment.  If significant ischemic burden is noted we can consider cardiac catheterization otherwise we will continue medical management.  Thank you for this consultation.  Dr. Qiu will see the patient for follow-up tomorrow.      KOBE Shipman, Kamaljit Lynne MD personally performed the services described in this documentation as scribed by the above named individual in my presence, and it is both accurate and complete.  7/19/2023  14:43 EDT

## 2023-07-20 ENCOUNTER — APPOINTMENT (OUTPATIENT)
Dept: CARDIOLOGY | Facility: HOSPITAL | Age: 83
End: 2023-07-20
Payer: MEDICARE

## 2023-07-20 VITALS
BODY MASS INDEX: 22.66 KG/M2 | TEMPERATURE: 97.7 F | OXYGEN SATURATION: 98 % | DIASTOLIC BLOOD PRESSURE: 79 MMHG | WEIGHT: 120 LBS | HEART RATE: 64 BPM | RESPIRATION RATE: 16 BRPM | SYSTOLIC BLOOD PRESSURE: 148 MMHG | HEIGHT: 61 IN

## 2023-07-20 PROBLEM — R07.9 CHEST PAIN: Status: RESOLVED | Noted: 2023-07-18 | Resolved: 2023-07-20

## 2023-07-20 LAB
ANION GAP SERPL CALCULATED.3IONS-SCNC: 13 MMOL/L (ref 5–15)
BH CV REST NUCLEAR ISOTOPE DOSE: 30 MCI
BH CV STRESS BP STAGE 1: NORMAL
BH CV STRESS BP STAGE 3: NORMAL
BH CV STRESS COMMENTS STAGE 1: NORMAL
BH CV STRESS DOSE REGADENOSON STAGE 1: 0.4
BH CV STRESS DURATION MIN STAGE 1: 1
BH CV STRESS DURATION MIN STAGE 2: 1
BH CV STRESS DURATION MIN STAGE 3: 1
BH CV STRESS DURATION MIN STAGE 4: 1
BH CV STRESS DURATION SEC STAGE 2: 0
BH CV STRESS HR STAGE 1: 76
BH CV STRESS HR STAGE 2: 100
BH CV STRESS HR STAGE 3: 100
BH CV STRESS HR STAGE 4: 100
BH CV STRESS NUCLEAR ISOTOPE DOSE: 30 MCI
BH CV STRESS O2 STAGE 1: 96
BH CV STRESS O2 STAGE 2: 95
BH CV STRESS O2 STAGE 3: 99
BH CV STRESS O2 STAGE 4: 100
BH CV STRESS PROTOCOL 1: NORMAL
BH CV STRESS RECOVERY BP: NORMAL MMHG
BH CV STRESS RECOVERY HR: 95 BPM
BH CV STRESS RECOVERY O2: 99 %
BH CV STRESS STAGE 1: 1
BH CV STRESS STAGE 2: 2
BH CV STRESS STAGE 3: 3
BH CV STRESS STAGE 4: 4
BUN SERPL-MCNC: 7 MG/DL (ref 8–23)
BUN/CREAT SERPL: 10.9 (ref 7–25)
CALCIUM SPEC-SCNC: 8.1 MG/DL (ref 8.6–10.5)
CHLORIDE SERPL-SCNC: 106 MMOL/L (ref 98–107)
CO2 SERPL-SCNC: 21 MMOL/L (ref 22–29)
CREAT SERPL-MCNC: 0.64 MG/DL (ref 0.57–1)
DEPRECATED RDW RBC AUTO: 45.9 FL (ref 37–54)
EGFRCR SERPLBLD CKD-EPI 2021: 87.3 ML/MIN/1.73
ERYTHROCYTE [DISTWIDTH] IN BLOOD BY AUTOMATED COUNT: 13 % (ref 12.3–15.4)
GLUCOSE SERPL-MCNC: 72 MG/DL (ref 65–99)
HCT VFR BLD AUTO: 38.2 % (ref 34–46.6)
HGB BLD-MCNC: 11.9 G/DL (ref 12–15.9)
MAXIMAL PREDICTED HEART RATE: 136 BPM
MCH RBC QN AUTO: 30.2 PG (ref 26.6–33)
MCHC RBC AUTO-ENTMCNC: 31.2 G/DL (ref 31.5–35.7)
MCV RBC AUTO: 97 FL (ref 79–97)
PERCENT MAX PREDICTED HR: 75 %
PLATELET # BLD AUTO: 111 10*3/MM3 (ref 140–450)
PMV BLD AUTO: 9.8 FL (ref 6–12)
POTASSIUM SERPL-SCNC: 4.1 MMOL/L (ref 3.5–5.2)
RBC # BLD AUTO: 3.94 10*6/MM3 (ref 3.77–5.28)
SODIUM SERPL-SCNC: 140 MMOL/L (ref 136–145)
STRESS BASELINE BP: NORMAL MMHG
STRESS BASELINE HR: 66 BPM
STRESS O2 SAT REST: 97 %
STRESS PERCENT HR: 88 %
STRESS POST ESTIMATED WORKLOAD: 1 METS
STRESS POST EXERCISE DUR MIN: 4 MIN
STRESS POST EXERCISE DUR SEC: 0 SEC
STRESS POST O2 SAT PEAK: 100 %
STRESS POST PEAK BP: NORMAL MMHG
STRESS POST PEAK HR: 102 BPM
STRESS TARGET HR: 116 BPM
T4 FREE SERPL-MCNC: 1.09 NG/DL (ref 0.93–1.7)
WBC NRBC COR # BLD: 3.51 10*3/MM3 (ref 3.4–10.8)

## 2023-07-20 PROCEDURE — 84439 ASSAY OF FREE THYROXINE: CPT | Performed by: HOSPITALIST

## 2023-07-20 PROCEDURE — G0378 HOSPITAL OBSERVATION PER HR: HCPCS

## 2023-07-20 PROCEDURE — 25010000002 REGADENOSON 0.4 MG/5ML SOLUTION: Performed by: INTERNAL MEDICINE

## 2023-07-20 PROCEDURE — 78431 MYOCRD IMG PET RST&STRS CT: CPT | Performed by: INTERNAL MEDICINE

## 2023-07-20 PROCEDURE — 80048 BASIC METABOLIC PNL TOTAL CA: CPT | Performed by: HOSPITALIST

## 2023-07-20 PROCEDURE — 78431 MYOCRD IMG PET RST&STRS CT: CPT

## 2023-07-20 PROCEDURE — 97161 PT EVAL LOW COMPLEX 20 MIN: CPT

## 2023-07-20 PROCEDURE — 93017 CV STRESS TEST TRACING ONLY: CPT

## 2023-07-20 PROCEDURE — 0 RUBIDIUM CHLORIDE: Performed by: INTERNAL MEDICINE

## 2023-07-20 PROCEDURE — 93018 CV STRESS TEST I&R ONLY: CPT | Performed by: INTERNAL MEDICINE

## 2023-07-20 PROCEDURE — 99214 OFFICE O/P EST MOD 30 MIN: CPT | Performed by: INTERNAL MEDICINE

## 2023-07-20 PROCEDURE — 96376 TX/PRO/DX INJ SAME DRUG ADON: CPT

## 2023-07-20 PROCEDURE — A9555 RB82 RUBIDIUM: HCPCS | Performed by: INTERNAL MEDICINE

## 2023-07-20 PROCEDURE — 85027 COMPLETE CBC AUTOMATED: CPT | Performed by: HOSPITALIST

## 2023-07-20 RX ORDER — CAFFEINE CITRATE 20 MG/ML
60 SOLUTION INTRAVENOUS ONCE
Status: COMPLETED | OUTPATIENT
Start: 2023-07-20 | End: 2023-07-20

## 2023-07-20 RX ORDER — REGADENOSON 0.08 MG/ML
0.4 INJECTION, SOLUTION INTRAVENOUS ONCE
Status: COMPLETED | OUTPATIENT
Start: 2023-07-20 | End: 2023-07-20

## 2023-07-20 RX ORDER — ONDANSETRON 4 MG/1
4 TABLET, ORALLY DISINTEGRATING ORAL EVERY 8 HOURS PRN
Qty: 20 TABLET | Refills: 0 | Status: SHIPPED | OUTPATIENT
Start: 2023-07-20

## 2023-07-20 RX ORDER — SUCRALFATE 1 G/1
1 TABLET ORAL 4 TIMES DAILY
Qty: 120 TABLET | Refills: 0 | Status: SHIPPED | OUTPATIENT
Start: 2023-07-20 | End: 2023-08-19

## 2023-07-20 RX ORDER — FAMOTIDINE 20 MG/1
20 TABLET, FILM COATED ORAL 2 TIMES DAILY
Start: 2023-07-20

## 2023-07-20 RX ADMIN — CINACALCET 60 MG: 30 TABLET ORAL at 08:35

## 2023-07-20 RX ADMIN — LOSARTAN POTASSIUM 50 MG: 50 TABLET, FILM COATED ORAL at 08:35

## 2023-07-20 RX ADMIN — REGADENOSON 0.4 MG: 0.08 INJECTION, SOLUTION INTRAVENOUS at 09:45

## 2023-07-20 RX ADMIN — AMLODIPINE BESYLATE 5 MG: 5 TABLET ORAL at 08:35

## 2023-07-20 RX ADMIN — CAFFEINE CITRATE 60 MG: 20 INJECTION, SOLUTION INTRAVENOUS at 10:10

## 2023-07-20 RX ADMIN — ACETAMINOPHEN 650 MG: 325 TABLET ORAL at 10:32

## 2023-07-20 RX ADMIN — CARBIDOPA AND LEVODOPA 1.5 TABLET: 25; 100 TABLET ORAL at 12:40

## 2023-07-20 RX ADMIN — ASPIRIN 81 MG: 81 TABLET, COATED ORAL at 08:35

## 2023-07-20 RX ADMIN — LEVOTHYROXINE SODIUM 25 MCG: 0.03 TABLET ORAL at 06:02

## 2023-07-20 RX ADMIN — NEBIVOLOL 10 MG: 10 TABLET ORAL at 12:40

## 2023-07-20 RX ADMIN — RUBIDIUM CHLORIDE RB-82 1 DOSE: 150 INJECTION, SOLUTION INTRAVENOUS at 09:47

## 2023-07-20 RX ADMIN — PANTOPRAZOLE SODIUM 40 MG: 40 INJECTION, POWDER, FOR SOLUTION INTRAVENOUS at 08:35

## 2023-07-20 RX ADMIN — RUBIDIUM CHLORIDE RB-82 1 DOSE: 150 INJECTION, SOLUTION INTRAVENOUS at 09:35

## 2023-07-20 RX ADMIN — ACETAMINOPHEN 650 MG: 325 TABLET ORAL at 00:15

## 2023-07-20 RX ADMIN — Medication 10 ML: at 08:35

## 2023-07-20 NOTE — PROGRESS NOTES
Continued Stay Note  [facility]     Patient Name: Nicolle Chavez  MRN: [id number]  Today's Date: 7/20/2023    Admit Date: 7/18/2023    Plan: Home at [place]   Discharge Plan       Row Name 07/20/23 1123       Plan    Plan Comments Transportation arranged for 4 pm with [employer] at reservation number #[phone] phone is [phone].                   Discharge Codes    No documentation.                 Expected Discharge Date and Time       Expected Discharge Date Expected Discharge Time    Jul 20, 2023               JUNE Gordon

## 2023-07-20 NOTE — THERAPY EVALUATION
Patient Name: Nicolle Chavez  : 1940    MRN: [id number]                              Today's Date: 2023       Admit Date: 2023    Visit Dx:     ICD-10-CM ICD-9-CM   1. Chest pain, unspecified type  R07.9 786.50   2. History of CAD (coronary artery disease)  Z86.79 V12.59   3. Elevated blood pressure reading with diagnosis of hypertension  I10 401.9   4. Hyperlipidemia, unspecified hyperlipidemia type  E78.5 272.4   5. NSTEMI, initial episode of care  I21.4 410.71   6. Unstable angina  I20.0 411.1   7. Gastroesophageal reflux disease, unspecified whether esophagitis present  K21.9 530.81     Patient Active Problem List   Diagnosis    Gastroesophageal reflux disease    Chronic kidney disease    Adenomatous polyp of colon    Hyperlipidemia LDL goal <100    Essential hypertension    Acquired hypothyroidism    Urinary incontinence    Coronary artery disease involving native coronary artery without angina pectoris    Parathyroid disorder    Anxiety    Adhesive capsulitis of left shoulder    Bursitis of left shoulder    Impingement syndrome of left shoulder    Nontraumatic complete tear of left rotator cuff    Biceps tendinitis of left upper extremity    Subluxation of tendon of long head of biceps    Osteoporosis    Anemia of unknown etiology     Past Medical History:   Diagnosis Date    Abdominal pain     Last Impression: 26 May 2015  Improving. The patient states she had a history of lower   abdominal pain after taking antibiotics for UTI, but this has improved.  Sanjiv Mak (Gastroenterology)    Abnormal weight gain     Anxiety 2016    Arthritis of neck     Cataract     Cataract     Cataracts, bilateral     CHF (congestive heart failure)     Congenital heart disease     Coronary artery disease 2016    Field ST-elevation myocardial infarction, 2015, with associated chest pain, jaw pain, diaphoresis, and dyspnea; inferior-anterior ST elevation by outside telemetry  strips.  Emergent cardiac catheterization, Dr. Isael Rosenbaum:  Status post drug-eluting stent to the proximal right coronary artery using a 2.25 x 23 mm Xience Alpine stent.  Ejection fraction 55% to 60%.     CTS (carpal tunnel syndrome)     Dysphagia 08/15/2016    Last Impression: 26 May 2015  History of intermittent dysphagia symptoms. Differentials     include esophageal dysmotility, Schatzki's ring.  Sanjiv Mak (Gastroenterology)      Fracture of wrist     Gout     2012    Gout 2012    H/O bone density study     Headache     Headache     High cholesterol     1995    Hyperparathyroidism     2009    Hypertension     Hypothyroidism     Low back strain     Myalgia and myositis 08/15/2016    Myocardial infarction     Neck strain     Palpitations 09/25/2017    Parathyroid disorder 12/12/2016    with a history of a left parathyroid parathyroidectomy.     Peptic ulcer disease     and bleeding ulcer approximately 12 years ago; incomplete database.     Rotator cuff syndrome 01/2021    Scoliosis      Past Surgical History:   Procedure Laterality Date    APPENDECTOMY      BREAST BIOPSY Right 1995    Ultrasound guided needle biopsy    CARDIAC CATHETERIZATION  06/2022    CAROTID STENT      COLONOSCOPY      CORONARY STENT PLACEMENT      EYE SURGERY Left     HYSTERECTOMY Bilateral 1978    OOPHORECTOMY      OTHER SURGICAL HISTORY      Ulcer repair    PARATHYROIDECTOMY Left     WRIST SURGERY  2012      General Information       Row Name 07/20/23 1444          Physical Therapy Time and Intention    Document Type evaluation  -AE     Mode of Treatment physical therapy  -AE       Row Name 07/20/23 1444          General Information    Patient Profile Reviewed yes  -AE     Prior Level of Function independent:;all household mobility;gait;transfer;ADL's;bed mobility  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   -AE     Existing Precautions/Restrictions fall;other (see comments)  PD  -AE     Barriers to Rehab medically  complex;previous functional deficit  -AE       Row Name 07/20/23 1444          Living Environment    People in Home spouse  -AE       Row Name 07/20/23 1444          Home Main Entrance    Number of Stairs, Main Entrance four  -AE     Stair Railings, Main Entrance railing on right side (ascending)  -AE       Row Name 07/20/23 1444          Stairs Within Home, Primary    Stairs, Within Home, Primary 16 steps to bed room  -AE     Number of Stairs, Within Home, Primary other (see comments)  16  -AE     Stair Railings, Within Home, Primary railing on left side (ascending)  -AE       Row Name 07/20/23 1444          Cognition    Orientation Status (Cognition) oriented x 4  -AE       Row Name 07/20/23 1444          Safety Issues, Functional Mobility    Safety Issues Affecting Function (Mobility) insight into deficits/self-awareness;safety precaution awareness;safety precautions follow-through/compliance;sequencing abilities  -AE     Impairments Affecting Function (Mobility) balance;endurance/activity tolerance;strength  -AE               User Key  (r) = Recorded By, (t) = Taken By, (c) = Cosigned By      Initials Name Provider Type    AE Dusty Aparicio, PT Physical Therapist                   Mobility       Row Name 07/20/23 1447          Bed Mobility    Bed Mobility supine-sit;sit-supine  -AE     Supine-Sit [place] (Bed Mobility) contact guard  -AE     Sit-Supine [place] (Bed Mobility) standby assist  -AE     Assistive Device (Bed Mobility) head of bed elevated  -AE     Comment, (Bed Mobility) VCs for hand placement and sequencing. Pt required increased time to complete bed mobility.  -AE       Row Name 07/20/23 1447          Transfers    Comment, (Transfers) VCs for hand placement and sequencing. Pt requires increased time to complete transfers. Pt reports not using AD at baseline but requested RW during session for support.  -AE       Row Name 07/20/23 1447          Sit-Stand Transfer    Sit-Stand  [place] (Transfers) contact guard;1 person assist;verbal cues  -AE     Assistive Device (Sit-Stand Transfers) walker, front-wheeled  -AE       Row Name 07/20/23 1447          Gait/Stairs (Locomotion)    [place] Level (Gait) contact guard;1 person assist;verbal cues  -AE     Assistive Device (Gait) walker, front-wheeled  -AE     Distance in Feet (Gait) 250  -AE     Deviations/Abnormal Patterns (Gait) bilateral deviations;base of support, narrow;odette decreased;gait speed decreased;stride length decreased  -AE     Bilateral Gait Deviations forward flexed posture;heel strike decreased  -AE     Comment, (Gait/Stairs) Pt demo step through gait pattern with slowed odette, decreased step length, and forward flexed posture. Pt required increased cues to widen PRESTON and cues to improve sequencing of AD to reduce risk of falls. Further distance limited by fatigue.  -AE               User Key  (r) = Recorded By, (t) = Taken By, (c) = Cosigned By      Initials Name Provider Type    AE Dusty Aparicio, PT Physical Therapist                   Obj/Interventions       Row Name 07/20/23 1456          Range of Motion Comprehensive    General Range of Motion bilateral lower extremity ROM WFL  -AE       Row Name 07/20/23 1456          Strength Comprehensive (MMT)    General Manual Muscle Testing (MMT) Assessment lower extremity strength deficits identified  -AE     Comment, General Manual Muscle Testing (MMT) Assessment BLE grossly 4/5  -AE       Row Name 07/20/23 1456          Balance    Balance Assessment sitting static balance;sitting dynamic balance;sit to stand dynamic balance;standing static balance;standing dynamic balance  -AE     Static Sitting Balance contact guard  -AE     Dynamic Sitting Balance contact guard  -AE     Position, Sitting Balance unsupported;sitting edge of bed  -AE     Sit to Stand Dynamic Balance contact guard;1-person assist;verbal cues  -AE     Static Standing Balance contact guard  -AE      Dynamic Standing Balance contact guard  -AE     Position/Device Used, Standing Balance supported;walker, front-wheeled  -AE       Row Name 07/20/23 1456          Sensory Assessment (Somatosensory)    Sensory Assessment (Somatosensory) LE sensation intact  -AE               User Key  (r) = Recorded By, (t) = Taken By, (c) = Cosigned By      Initials Name Provider Type    AE Dusty Aparicio, PT Physical Therapist                   Goals/Plan       Row Name 07/20/23 1500          Bed Mobility Goal 1 (PT)    Activity/Assistive Device (Bed Mobility Goal 1, PT) sit to supine/supine to sit  -AE     [place] Level/Cues Needed (Bed Mobility Goal 1, PT) modified independence  -AE     Time Frame (Bed Mobility Goal 1, PT) long term goal (LTG);10 days  -AE     Progress/Outcomes (Bed Mobility Goal 1, PT) goal ongoing  -AE       Row Name 07/20/23 1500          Transfer Goal 1 (PT)    Activity/Assistive Device (Transfer Goal 1, PT) sit-to-stand/stand-to-sit;bed-to-chair/chair-to-bed  -AE     [place] Level/Cues Needed (Transfer Goal 1, PT) independent  -AE     Time Frame (Transfer Goal 1, PT) long term goal (LTG);10 days  -AE     Progress/Outcome (Transfer Goal 1, PT) goal ongoing  -AE       Row Name 07/20/23 1500          Gait Training Goal 1 (PT)    Activity/Assistive Device (Gait Training Goal 1, PT) gait (walking locomotion);assistive device use  -AE     [place] Level (Gait Training Goal 1, PT) standby assist  -AE     Distance (Gait Training Goal 1, PT) 500ft  -AE     Time Frame (Gait Training Goal 1, PT) long term goal (LTG);10 days  -AE     Progress/Outcome (Gait Training Goal 1, PT) goal ongoing  -AE       Row Name 07/20/23 1500          Stairs Goal 1 (PT)    Activity/Assistive Device (Stairs Goal 1, PT) ascending stairs;descending stairs;step-to-step  -AE     [place] Level/Cues Needed (Stairs Goal 1, PT) contact guard required  -AE     Number of Stairs (Stairs Goal 1, PT) 16  -AE     Time Frame (Stairs  Goal 1, PT) long term goal (LTG);10 days  -AE     Progress/Outcome (Stairs Goal 1, PT) goal ongoing  -AE       Row Name 07/20/23 1500          Therapy Assessment/Plan (PT)    Planned Therapy Interventions (PT) balance training;bed mobility training;gait training;home exercise program;patient/family education;postural re-education;ROM (range of motion);stair training;strengthening;transfer training  -AE               User Key  (r) = Recorded By, (t) = Taken By, (c) = Cosigned By      Initials Name Provider Type    AE Dusty Aparicio, PT Physical Therapist                   Clinical Impression       Row Name 07/20/23 1457          Pain    Pretreatment Pain Rating 0/10 - no pain  -AE     Posttreatment Pain Rating 0/10 - no pain  -AE       Row Name 07/20/23 1457          Plan of Care Review    Plan of Care Reviewed With patient  -AE     Progress no change  -AE     Outcome Evaluation Pt presents with strength deficits, decreased endurance with activity, and decreased independence. Pt ambulated 250ft with CGA and RW for support. Pt required cues to widen [place] and improve sequencing of AD to reduce risk of falling. Recommend continued skilled IP PT interventions. Recommend D/C home with assist and continuation of OP PT.  -AE       Row Name 07/20/23 1457          Therapy Assessment/Plan (PT)    Patient/Family Therapy Goals Statement (PT) Return home  -AE     Rehab Potential (PT) good, to achieve stated therapy goals  -AE     Criteria for Skilled Interventions Met (PT) yes  -AE     Therapy Frequency (PT) daily  -AE       Row Name 07/20/23 1457          Vital Signs    Pre Systolic BP Rehab 148  -AE     Pre Treatment Diastolic BP 79  -AE     Pretreatment Heart Rate (beats/min) 69  -AE     Posttreatment Heart Rate (beats/min) 65  -AE     Pre SpO2 (%) 98  -AE     O2 Delivery Pre Treatment room air  -AE     O2 Delivery Intra Treatment room air  -AE     Post SpO2 (%) 98  -AE     O2 Delivery Post Treatment room air  -AE     Pre  Patient Position Supine  -AE     Intra Patient Position Standing  -AE     Post Patient Position Supine  -AE       Row Name 07/20/23 1457          Positioning and Restraints    Pre-Treatment Position in bed  -AE     Post Treatment Position bed  -AE     In Bed notified nsg;supine;call light within reach;encouraged to call for assist;exit alarm on;side rails up x2;legs elevated  -AE               User Key  (r) = Recorded By, (t) = Taken By, (c) = Cosigned By      Initials Name Provider Type    AE Dusty Aparicio, PT Physical Therapist                   Outcome Measures       Row Name 07/20/23 1502 07/20/23 0715       How much help from another person do you currently need...    Turning from your back to your side while in flat bed without using bedrails? 4  -AE 4  -SC    Moving from lying on back to sitting on the side of a flat bed without bedrails? 3  -AE 4  -SC    Moving to and from a bed to a chair (including a wheelchair)? 3  -AE 4  -SC    Standing up from a chair using your arms (e.g., wheelchair, bedside chair)? 3  -AE 4  -SC    Climbing 3-5 steps with a railing? 3  -AE 4  -SC    To walk in hospital room? 3  -AE 4  -SC    AM-PAC 6 Clicks Score (PT) 19  -AE 24  -SC    Highest level of mobility 6 --> Walked 10 steps or more  -AE 8 --> Walked 250 feet or more  -SC      Row Name 07/20/23 1502          Functional Assessment    Outcome Measure Options AM-PAC 6 Clicks Basic Mobility (PT)  -AE               User Key  (r) = Recorded By, (t) = Taken By, (c) = Cosigned By      Initials Name Provider Type    Dusty De La Torre, PT Physical Therapist    SC Philomena Slaughter, RN Registered Nurse                                 Physical Therapy Education       Title: PT OT SLP Therapies (In Progress)       Topic: Physical Therapy (In Progress)       Point: Mobility training (Done)       Learning Progress Summary             Patient Acceptance, E, VU by AE at 7/20/2023 1321                         Point: Home exercise program  (Not Started)       Learner Progress:  Not documented in this visit.              Point: Body mechanics (Done)       Learning Progress Summary             Patient Acceptance, E, VU by AE at 7/20/2023 1321                         Point: Precautions (Done)       Learning Progress Summary             Patient Acceptance, E, VU by AE at 7/20/2023 1321                                         User Key       Initials Effective Dates Name Provider Type Discipline    AE 09/21/21 -  Dusty Aparicio, PT Physical Therapist PT                  PT Recommendation and Plan  Planned Therapy Interventions (PT): balance training, bed mobility training, gait training, home exercise program, patient/family education, postural re-education, ROM (range of motion), stair training, strengthening, transfer training  Plan of Care Reviewed With: patient  Progress: no change  Outcome Evaluation: Pt presents with strength deficits, decreased endurance with activity, and decreased independence. Pt ambulated 250ft with CGA and RW for support. Pt required cues to widen [place] and improve sequencing of AD to reduce risk of falling. Recommend continued skilled IP PT interventions. Recommend D/C home with assist and continuation of OP PT.     Time Calculation:   PT Evaluation Complexity  History, PT Evaluation Complexity: 1-2 personal factors and/or comorbidities  Examination of Body Systems (PT Eval Complexity): total of 3 or more elements  Clinical Presentation (PT Evaluation Complexity): stable  Clinical Decision Making (PT Evaluation Complexity): low complexity  Overall Complexity (PT Evaluation Complexity): low complexity     PT Charges       Row Name 07/20/23 1504             Time Calculation    Start Time 1321  -AE      PT Received On 07/20/23  -AE      PT Goal Re-Cert Due Date 07/30/23  -AE         Untimed Charges    PT Eval/Re-eval Minutes 52  -AE         Total Minutes    Untimed Charges Total Minutes 52  -AE       Total Minutes 52  -AE                 User Key  (r) = Recorded By, (t) = Taken By, (c) = Cosigned By      Initials Name Provider Type    AE Dusty Aparicio, PT Physical Therapist                  Therapy Charges for Today       Code Description Service Date Service Provider Modifiers Qty    85968415328 HC PT EVAL LOW COMPLEXITY 4 7/20/2023 Dusty Aparicio, PT GP 1            PT G-Codes  Outcome Measure Options: AM-PAC 6 Clicks Basic Mobility (PT)  AM-PAC 6 Clicks Score (PT): 19  PT Discharge Summary  Anticipated Discharge Disposition (PT): home with assist, home with outpatient therapy services    Dusty Aparicio PT  7/20/2023

## 2023-07-20 NOTE — PLAN OF CARE
Goal Outcome Evaluation:  Plan of Care Reviewed With: patient        Progress: no change  Outcome Evaluation: Pt presents with strength deficits, decreased endurance with activity, and decreased independence. Pt ambulated 250ft with CGA and RW for support. Pt required cues to widen [place] and improve sequencing of AD to reduce risk of falling. Recommend continued skilled IP PT interventions. Recommend D/C home with assist and continuation of OP PT.      Anticipated Discharge Disposition (PT): home with assist, home with outpatient therapy services

## 2023-07-20 NOTE — PLAN OF CARE
Problem: Adult Inpatient Plan of Care  Goal: Plan of Care Review  Outcome: Ongoing, Progressing  Goal: Patient-Specific Goal (Individualized)  Outcome: Ongoing, Progressing  Goal: Absence of Hospital-Acquired Illness or Injury  Outcome: Ongoing, Progressing  Intervention: Identify and Manage Fall Risk  Intervention: Prevent Skin Injury  Intervention: Prevent and Manage VTE (Venous Thromboembolism) Risk  Intervention: Prevent Infection  Goal: Optimal Comfort and Wellbeing  Outcome: Ongoing, Progressing  Goal: Readiness for Transition of Care  Outcome: Ongoing, Progressing   Goal Outcome Evaluation:  Plan of Care Reviewed With: patient

## 2023-07-20 NOTE — DISCHARGE SUMMARY
[facility] Medicine Services  DISCHARGE SUMMARY    Patient Name: Nicolle Chavez  : 1940  MRN: [id number]    Date of Admission: 2023  4:12 PM  Date of Discharge:  23  Primary Care Physician: Caren Recinos MD    Consults       Date and Time Order Name Status Description    2023  2:47 PM Inpatient Gastroenterology Consult Completed     2023 12:34 AM Inpatient Cardiology Consult Completed             Hospital Course     Presenting Problem: chest pain, nausea, vomiting    Active Hospital Problems    Diagnosis  POA    Anemia of unknown etiology [D64.9]  Unknown    Coronary artery disease involving native coronary artery without angina pectoris [I25.10]  Yes    Acquired hypothyroidism [E03.9]  Yes    Essential hypertension [I10]  Yes    Gastroesophageal reflux disease [K21.9]  Yes    Hyperlipidemia LDL goal <100 [E78.5]  Yes      Resolved Hospital Problems    Diagnosis Date Resolved POA    Chest pain [R07.9] 2023 Yes          Hospital Course:  Nicolle Chavez is a 82 y.o. female  with hx of CAD, HTN, HLD, hypothyroidism, s/p left parathyroidectomy, LE edema, PUD, PAC's, nonsustained atrial tachycardia, anxiety, and gout who presents to the ED via EMS for evaluation of left sided chest pain associated with severe headache and nausea and vomiting.      Atypical chest pain   Hx of CAD  Hx of PUD  - Initial HS trop 14, repeat 16  - EKG unremarkable  - Cardiology consulted, Dr. Lynne had planned for left heart cath however on arrival to cath lab she was actively vomiting, without any chest pain--canceled LHC and performed stress test today which was negative  - GI was consulted, recommended continue Pepcid as she is intolerant of Protonix--will also start Carafate at discharge and arrange for outpatient GI follow up as she may eventually need an EGD     Headache, resolved  - Tylenol PRN  - No falls, no neurologic deficits, not on blood thinners -  only aspirin     Vomiting, resolved  - Could be related to PUD  - s/p gentle fluids   - Antiemetics as needed     Anemia  - Denies blood in stool or dark stools; no recent EGD/colonoscopy  - H&H have been stable since admission     HTN  HLD  PAC's  - Continue home meds     GERD  PUD  - Patient not taking PO Protonix due to causing diarrhea  - Pepcid BID, continue at discharge     Hypothyroidism  - Continue levothyroxine  - TSH elevated, free T4 normal  - Will not make adjustments at this time, recommend F/U with PCP as outpatient     Parkinson's disease  - Continue carbidopa/levodopa  - Continue sensipar     Unintentional weight loss  - Following with PCP      Discharge Follow Up Recommendations for outpatient labs/diagnostics:  F/U with PCP in 1 week  Ambulatory referral to [facility]     Day of Discharge     HPI:   Patient seen this morning. Overall feels better. No further chest pain. Some mild nausea but no further monitoring.     Review of Systems  Gen-no fevers, no chills  CV-no chest pain, no palpitations  Resp-no cough, no dyspnea  GI-mild nausea, no vomiting, no abd pain    Vital Signs:   Temp:  [97.2 °F (36.2 °C)-98.3 °F (36.8 °C)] 97.7 °F (36.5 °C)  Heart Rate:  [46-73] 64  Resp:  [16-18] 16  BP: (139-170)/(59-94) 148/79      Physical Exam:  Gen-no acute distress  HENT-NCAT, mucous membranes moist  CV-RRR, S1 S2 normal, no m/r/g  Resp-CTAB, no wheezes or rales  Abd-soft, NT, ND, +BS  Ext-no edema  Neuro-A&Ox3, no focal deficits  Skin-no rashes  Psych-appropriate mood      Pertinent  and/or Most Recent Results     LAB RESULTS:      Lab 07/20/23  0337 07/19/23  0541 07/18/23  2316 07/18/23  1619   WBC 3.51  --   --  4.31   HEMOGLOBIN 11.9* 11.1* 11.1* 10.8*   HEMATOCRIT 38.2 34.2 34.4 34.5   PLATELETS 111*  --   --  112*   NEUTROS ABS  --   --   --  2.79   IMMATURE GRANS (ABS)  --   --   --  0.27*   LYMPHS ABS  --   --   --  0.58*   MONOS ABS  --   --   --  0.63   EOS ABS  --   --   --  0.03   MCV 97.0  --    --  94.8   LACTATE  --   --   --  0.8   D DIMER QUANT  --   --   --  0.29         Lab 07/20/23  0337 07/19/23  0541 07/18/23  1847 07/18/23  1619   SODIUM 140 140  --  141   POTASSIUM 4.1 3.6  --  3.8   CHLORIDE 106 106  --  108*   CO2 21.0* 24.0  --  25.0   ANION GAP 13.0 10.0  --  8.0   BUN 7* 10  --  11   CREATININE 0.64 0.57  --  0.63   EGFR 87.3 89.7  --  87.6   GLUCOSE 72 80  --  110*   CALCIUM 8.1* 8.4*  --  8.0*   HEMOGLOBIN A1C  --   --   --  4.60*   TSH  --   --  7.380*  --          Lab 07/18/23  1619   TOTAL PROTEIN 5.3*   ALBUMIN 3.8   GLOBULIN 1.5   ALT (SGPT) <5   AST (SGOT) 13   BILIRUBIN 0.4   ALK PHOS 71   LIPASE 52         Lab 07/19/23  0541 07/18/23  1847 07/18/23  1619   PROBNP  --   --  644.5   HSTROP T 17* 16* 14*             Lab 07/18/23  2247 07/18/23  1847   IRON  --  23*   IRON SATURATION (TSAT)  --  8*   TIBC  --  288*   TRANSFERRIN  --  193*   FERRITIN  --  101.20   FOLATE 8.65  --    VITAMIN B 12 390  --          Brief Urine Lab Results       None          Microbiology Results (last 10 days)       ** No results found for the last 240 hours. **            Adult Transthoracic Echo Complete w/ Color, Spectral and Contrast if necessary per protocol    Result Date: 7/19/2023    Left ventricular systolic function is normal. Calculated left ventricular EF = 63% Left ventricular ejection fraction appears to be 61 - 65%.   Left ventricular wall thickness is consistent with mild septal asymmetric hypertrophy. Sigmoid-shaped ventricular septum is present.   Left ventricular diastolic function was normal.   Mild aortic valve regurgitation   Estimated right ventricular systolic pressure from tricuspid regurgitation is normal (<35 mmHg). Calculated right ventricular systolic pressure from tricuspid regurgitation is 32 mmHg.     XR Chest 1 View    Result Date: 7/18/2023  XR CHEST 1 VW Date of Exam: 7/18/2023 4:13 PM EDT Indication: Chest Pain Triage Protocol Comparison: May 8, 2018 Findings: The lungs  are clear. The heart and mediastinal contours appear normal. There is no pleural effusion. The pulmonary vasculature appears normal. The osseous structures appear intact.     Impression: No acute cardiopulmonary process. Electronically Signed: Jose Roberto Watts  7/18/2023 4:32 PM EDT  Workstation ID: [id number]    Stress Test With Pet Myocardial Perfusion    Result Date: 7/20/2023    Left ventricular ejection fraction is normal.   REST EF = 78% STRESS EF = 77%.   Myocardial perfusion imaging indicates a normal myocardial perfusion study with no evidence of ischemia.   Impressions are consistent with a low risk study.     Aborted Cath Cath    Result Date: 7/19/2023  Aborted Case     Results for orders placed during the hospital encounter of 06/06/19    Duplex Carotid Ultrasound CAR    Interpretation Summary  · Proximal right internal carotid artery plaque without significant stenosis.  · Right internal carotid artery stenosis of 0-49%.  · Proximal left internal carotid artery plaque without significant stenosis.  · Left internal carotid artery stenosis of 0-49%.      Results for orders placed during the hospital encounter of 06/06/19    Duplex Carotid Ultrasound CAR    Interpretation Summary  · Proximal right internal carotid artery plaque without significant stenosis.  · Right internal carotid artery stenosis of 0-49%.  · Proximal left internal carotid artery plaque without significant stenosis.  · Left internal carotid artery stenosis of 0-49%.      Results for orders placed during the hospital encounter of 07/18/23    Adult Transthoracic Echo Complete w/ Color, Spectral and Contrast if necessary per protocol    Interpretation Summary    Left ventricular systolic function is normal. Calculated left ventricular EF = 63% Left ventricular ejection fraction appears to be 61 - 65%.    Left ventricular wall thickness is consistent with mild septal asymmetric hypertrophy. Sigmoid-shaped ventricular septum is present.    Left  ventricular diastolic function was normal.    Mild aortic valve regurgitation    Estimated right ventricular systolic pressure from tricuspid regurgitation is normal (<35 mmHg). Calculated right ventricular systolic pressure from tricuspid regurgitation is 32 mmHg.        Discharge Details        Discharge Medications        New Medications        Instructions Start Date   famotidine 20 MG tablet  Commonly known as: Pepcid   20 mg, Oral, 2 Times Daily      ondansetron ODT 4 MG disintegrating tablet  Commonly known as: ZOFRAN-ODT   4 mg, Translingual, Every 8 Hours PRN      sucralfate 1 g tablet  Commonly known as: Carafate   1 g, Oral, 4 Times Daily             Continue These Medications        Instructions Start Date   amLODIPine 5 MG tablet  Commonly known as: NORVASC   TAKE 1 TABLET BY MOUTH EVERY DAY      aspirin 81 MG tablet   1 tablet, Oral, Daily      carbidopa-levodopa  MG per tablet  Commonly known as: Sinemet   1.5 tablets, Oral, 4 Times Daily      chlorthalidone 25 MG tablet  Commonly known as: HYGROTON   TAKE 1 TABLET BY MOUTH EVERY DAY Oral for 30      cinacalcet 60 MG tablet  Commonly known as: SENSIPAR   1 tablet, Oral, Daily      cloNIDine 0.1 MG tablet  Commonly known as: Catapres   0.1 mg, Oral, Every 8 Hours PRN      Edarbi 40 MG tablet  Generic drug: Azilsartan Medoxomil   Oral for 90      Eylea 2 MG/0.05ML solution  Generic drug: Aflibercept   Intravitreal, As Needed      hydroCHLOROthiazide 12.5 MG capsule  Commonly known as: MICROZIDE   TAKE 1 CAPSULE BY MOUTH EVERY DAY Oral for 90      irbesartan 150 MG tablet  Commonly known as: AVAPRO   TAKE 1 TABLET BY MOUTH EVERY DAY      levothyroxine 25 MCG tablet  Commonly known as: Synthroid   25 mcg, Oral, Daily      nebivolol 10 MG tablet  Commonly known as: BYSTOLIC   TAKE 1 TABLET BY MOUTH EVERY DAY      nitroglycerin 0.4 MG SL tablet  Commonly known as: NITROSTAT   0.4 mg, Sublingual, Every 5 Minutes PRN, Take no more than 3 doses in 15  minutes.       Premarin 0.625 MG/GM vaginal cream  Generic drug: Estrogens Conjugated   Vaginal, Weekly      Prolia 60 MG/ML solution prefilled syringe syringe  Generic drug: denosumab   60 mg, Subcutaneous, Once, 2 TIMES A YEAR       Repatha SureClick solution auto-injector SureClick injection  Generic drug: Evolocumab   140 mg, Subcutaneous, Every 14 Days             Stop These Medications      pantoprazole 40 MG EC tablet  Commonly known as: PROTONIX              Allergies   Allergen Reactions    Pravachol [Pravastatin Sodium] Myalgia    Statins Myalgia    Zocor [Simvastatin] Myalgia    Aspirin Rash    Bactrim [Sulfamethoxazole-Trimethoprim] Rash    Crestor [Rosuvastatin Calcium] Myalgia    Lipitor [Atorvastatin] Myalgia         Discharge Disposition:  Home or Self Care    Diet:  Hospital:  Diet Order   Procedures    Diet: Cardiac Diets; Healthy Heart (2-3 Na+); Texture: Regular Texture (IDDSI 7); Fluid Consistency: Thin (IDDSI 0)       Activity:  Activity Instructions       Activity as Tolerated                   CODE STATUS:    Code Status and Medical Interventions:   Ordered at: 07/18/23 2059     Level Of Support Discussed With:    Patient     Code Status (Patient has no pulse and is not breathing):    CPR (Attempt to Resuscitate)     Medical Interventions (Patient has pulse or is breathing):    Full Support     Release to patient:    Routine Release       Future Appointments   Date Time Provider Department Center   7/25/2023  2:15 PM Rick Qiu III, MD [facility]   8/14/2023  1:45 PM Jayy Thomson MD [facility]   9/6/2023 11:00 AM Christelle Gamez DO [facility]   10/27/2023  2:00 PM  1 - CHAIR 1 [facility]  [facility]       Additional Instructions for the Follow-ups that You Need to Schedule       Discharge Follow-up with PCP   As directed       Currently Documented PCP:    Caren Recinos MD    PCP Phone Number:    [phone]     Follow Up Details: 1 week                        Natalia Best MD  07/20/23      Time Spent on Discharge:  I spent  20  minutes on this discharge activity which included: face-to-face encounter with the patient, reviewing the data in the system, coordination of the care with the nursing staff as well as consultants, documentation, and entering orders.

## 2023-07-20 NOTE — PROGRESS NOTES
"[facility] Progress Note     LOS: 0 days   Patient Care Team:  Caren Recinos MD as PCP - General (Internal Medicine)  PCP:  Caren Recinos MD    Chief Complaint: Abnormal troponin    SUBJECTIVE: Nausea and vomiting resolving.  Was able to eat a little breakfast this morning.  Currently denies chest discomfort or dyspnea.      Review of Systems:   All systems have been reviewed and are negative with the exception of those mentioned above.      OBJECTIVE:    Vital Sign Min/Max for last 24 hours  Temp  Min: 97.2 °F (36.2 °C)  Max: 98.3 °F (36.8 °C)   BP  Min: 139/59  Max: 170/64   Pulse  Min: 46  Max: 73   Resp  Min: 16  Max: 18   SpO2  Min: 95 %  Max: 98 %   No data recorded   Weight  Min: 54.4 kg (120 lb)  Max: 54.7 kg (120 lb 9.6 oz)     Flowsheet Rows      Flowsheet Row First Filed Value   Admission Height 154.9 cm (61\") Documented at 07/18/2023 1617   Admission Weight 49.9 kg (110 lb) Documented at 07/18/2023 1617              No intake or output data in the 24 hours ending 07/20/23 1243  Intake & Output (last 3 days)         07/17 0701 07/18 0700 07/18 0701  07/19 0700 07/19 0701  07/20 0700 07/20 0701 07/21 0700            Urine Unmeasured Occurrence   3 x              Physical Exam:    General Appearance:    Alert, cooperative, no distress, appears stated age   Neck:   Supple, symmetrical, trachea midline.   Lungs:     Clear to auscultation bilaterally, respirations unlabored   Chest Wall:    No tenderness or deformity    Heart:    Regular rate and rhythm, S1 and S2 normal, no murmur, rub   or gallop, normal carotid impulse bilaterally without bruit.   Extremities:   Extremities normal, atraumatic, no cyanosis or edema   Pulses:   2+ and symmetric all extremities   Skin:   Skin color, texture, turgor normal, no rashes or lesions      LABS/DIAGNOSTIC DATA:  Results from last 7 days   Lab Units 07/20/23  0337 07/19/23  0541 07/18/23  2316 07/18/23  1619 "   WBC 10*3/mm3 3.51  --   --  4.31   HEMOGLOBIN g/dL 11.9* 11.1* 11.1* 10.8*   HEMATOCRIT % 38.2 34.2 34.4 34.5   PLATELETS 10*3/mm3 111*  --   --  112*     Lab Results   Lab Value Date/Time    TROPONINT 17 (H) 07/19/2023 0541    TROPONINT 16 (H) 07/18/2023 1847    TROPONINT 14 (H) 07/18/2023 1619         Results from last 7 days   Lab Units 07/20/23  0337 07/19/23  0541 07/18/23  1619   SODIUM mmol/L 140 140 141   POTASSIUM mmol/L 4.1 3.6 3.8   CHLORIDE mmol/L 106 106 108*   CO2 mmol/L 21.0* 24.0 25.0   BUN mg/dL 7* 10 11   CREATININE mg/dL 0.64 0.57 0.63   CALCIUM mg/dL 8.1* 8.4* 8.0*   BILIRUBIN mg/dL  --   --  0.4   ALK PHOS U/L  --   --  71   ALT (SGPT) U/L  --   --  <5   AST (SGOT) U/L  --   --  13   GLUCOSE mg/dL 72 80 110*     Results from last 7 days   Lab Units 07/18/23  1619   HEMOGLOBIN A1C % 4.60*         Results from last 7 days   Lab Units 07/20/23  0337 07/18/23  1847   TSH uIU/mL  --  7.380*   FREE T4 ng/dL 1.09  --            Medication Review:   amLODIPine, 5 mg, Oral, Daily  aspirin, 81 mg, Oral, Daily  carbidopa-levodopa, 1.5 tablet, Oral, 4x Daily  cinacalcet, 60 mg, Oral, Daily  levothyroxine, 25 mcg, Oral, Daily  losartan, 50 mg, Oral, Q24H  nebivolol, 10 mg, Oral, Daily  pantoprazole, 40 mg, Intravenous, Daily  promethazine, 12.5 mg, Intravenous, Once  senna-docusate sodium, 2 tablet, Oral, BID  sodium chloride, 10 mL, Intravenous, Q12H           Cardiac testing this admission:  TTE    Left ventricular systolic function is normal. Calculated left ventricular EF = 63% Left ventricular ejection fraction appears to be 61 - 65%.    Left ventricular wall thickness is consistent with mild septal asymmetric hypertrophy. Sigmoid-shaped ventricular septum is present.    Left ventricular diastolic function was normal.    Mild aortic valve regurgitation    Estimated right ventricular systolic pressure from tricuspid regurgitation is normal (<35 mmHg). Calculated right ventricular systolic pressure from  tricuspid regurgitation is 32 mmHg.  Myocardial perfusion imaging    Left ventricular ejection fraction is normal.    REST EF = 78% STRESS EF = 77%.    Myocardial perfusion imaging indicates a normal myocardial perfusion study with no evidence of ischemia.    Impressions are consistent with a low risk study.    ASSESSMENT/PLAN:    Chest pain    Gastroesophageal reflux disease    Hyperlipidemia LDL goal <100    Essential hypertension    Acquired hypothyroidism    Coronary artery disease involving native coronary artery without angina pectoris    Anemia of unknown etiology    NSTEMI, initial episode of care    Unstable angina        Benign cardiac evaluation.  Stable for discharge from a cardiac standpoint on previous medical therapy.          Rick Qiu III, MD   07/20/23  12:43 EDT

## 2023-07-20 NOTE — PLAN OF CARE
Problem: Adult Inpatient Plan of Care  Goal: Absence of Hospital-Acquired Illness or Injury  Intervention: Identify and Manage Fall Risk  Intervention: Prevent Skin Injury  Intervention: Prevent and Manage VTE (Venous Thromboembolism) Risk     Problem: Adult Inpatient Plan of Care  Goal: Absence of Hospital-Acquired Illness or Injury  Intervention: Identify and Manage Fall Risk  Intervention: Prevent Skin Injury  Intervention: Prevent and Manage VTE (Venous Thromboembolism) Risk     Problem: Pain Acute  Goal: Acceptable Pain Control and Functional Ability  Intervention: Prevent or Manage Pain  Intervention: Optimize Psychosocial Wellbeing     Problem: Pain Acute  Goal: Acceptable Pain Control and Functional Ability  Intervention: Prevent or Manage Pain  Intervention: Optimize Psychosocial Wellbeing     Problem: Pain Acute  Goal: Acceptable Pain Control and Functional Ability  Intervention: Prevent or Manage Pain  Intervention: Optimize Psychosocial Wellbeing   Goal Outcome Evaluation:  Plan of Care Reviewed With: patient

## 2023-07-25 ENCOUNTER — OFFICE VISIT (OUTPATIENT)
Dept: CARDIOLOGY | Facility: CLINIC | Age: 83
End: 2023-07-25
Payer: MEDICARE

## 2023-07-25 VITALS
SYSTOLIC BLOOD PRESSURE: 142 MMHG | BODY MASS INDEX: 19.63 KG/M2 | OXYGEN SATURATION: 96 % | DIASTOLIC BLOOD PRESSURE: 70 MMHG | HEART RATE: 60 BPM | HEIGHT: 61 IN | WEIGHT: 104 LBS

## 2023-07-25 DIAGNOSIS — E78.5 HYPERLIPIDEMIA LDL GOAL <100: ICD-10-CM

## 2023-07-25 DIAGNOSIS — I10 ESSENTIAL HYPERTENSION: ICD-10-CM

## 2023-07-25 DIAGNOSIS — I25.10 CORONARY ARTERY DISEASE INVOLVING NATIVE CORONARY ARTERY OF NATIVE HEART WITHOUT ANGINA PECTORIS: Primary | ICD-10-CM

## 2023-07-25 PROCEDURE — 3077F SYST BP >= 140 MM HG: CPT | Performed by: INTERNAL MEDICINE

## 2023-07-25 PROCEDURE — 3078F DIAST BP <80 MM HG: CPT | Performed by: INTERNAL MEDICINE

## 2023-07-25 PROCEDURE — 99214 OFFICE O/P EST MOD 30 MIN: CPT | Performed by: INTERNAL MEDICINE

## 2023-07-25 NOTE — PROGRESS NOTES
[facility]  Office visit  Nicolle Chavez  1940  [phone]    VISIT DATE:  7/25/2023      PCP: Caren Recinos MD  [address] [id number]    CC:  Chief Complaint   Patient presents with    Coronary Artery Disease       PROBLEM LIST:  Coronary artery disease:  Field ST-elevation myocardial infarction, 11/25/2015, with associated chest pain, jaw pain, diaphoresis, and dyspnea; inferior-anterior ST elevation by outside telemetry strips.   Emergent cardiac catheterization, Dr. Isael Rosenbamu: Status post drug-eluting stent to the proximal right coronary artery using a 2.25 x 23 mm Xience Alpine stent.   Ejection fraction 55% to 60%.   Uncontrolled Essential HTN.  Dyspnea.   Mixed Dyslipidemia.   Hypothyroidism, on chronic supplementation.   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   Lower extremity edema.   History of peptic ulcer disease and bleeding ulcer approximately 12 years ago; incomplete database.   Family history of coronary disease.   Anxiety.   Gout.   Surgical history:  Hysterectomy.   Left parathyroidectomy.  Ulcer repair.       Previous cardiac studies and procedures:  October 2019 2-week ambulatory ECG monitor:  Rare PACs which are intermittently symptomatic. Episodes of nonsustained atrial tachycardia.    July 2023  TTE    Left ventricular systolic function is normal. Calculated left ventricular EF = 63% Left ventricular ejection fraction appears to be 61 - 65%.    Left ventricular wall thickness is consistent with mild septal asymmetric hypertrophy. Sigmoid-shaped ventricular septum is present.    Left ventricular diastolic function was normal.    Mild aortic valve regurgitation    Estimated right ventricular systolic pressure from tricuspid regurgitation is normal (<35 mmHg). Calculated right ventricular systolic pressure from tricuspid regurgitation is 32 mmHg.  Myocardial perfusion imaging    REST EF =  "78% STRESS EF = 77%.    Myocardial perfusion imaging indicates a normal myocardial perfusion study with no evidence of ischemia.    ASSESSMENT:   Diagnosis Plan   1. Coronary artery disease involving native coronary artery of native heart without angina pectoris        2. Essential hypertension        3. Hyperlipidemia LDL goal <100            PLAN:  Coronary artery disease: Stable and asymptomatic with recent reassuring perfusion imaging.  Continue current medical therapy.      Hyperlipidemia: Significant improvement in overall cholesterol profile after initiation of PCSK9 inhibitor.  Continue current medical therapy.      Hypertension: Goal less than 130/80 mmHg. Did not tolerate thiazide diuretics.  Continue amlodipine 5 mg, nebivolol 10 mg every morning, continue clonidine 0.1 mg p.o. as needed for blood pressures greater than 180/100 mmHg.  Continue irbesartan 150 mg p.o. nightly..  Continue to keep home blood pressure log.    Symptomatic PACs: Currently well controlled.  Continue beta-blockade.  Known episodes of asymptomatic nonsustained atrial tachycardia.    Subjective  Denies palpitations.  Still with persistent nausea and anorexia.  Ongoing weight loss.  GI evaluation pending.  Blood pressures running less than 130/80 mmHg.    PHYSICAL EXAMINATION:  Vitals:    07/25/23 1409   BP: 142/70   BP Location: Right arm   Patient Position: Sitting   Pulse: 60   SpO2: 96%   Weight: 47.2 kg (104 lb)   Height: 154.9 cm (61\")       General Appearance:    Alert, cooperative, no distress, appears stated age   Head:    Normocephalic, without obvious abnormality, atraumatic   Eyes:    conjunctiva/corneas clear   Nose:   Nares normal, septum midline, mucosa normal, no drainage   Throat:   Lips, teeth and gums normal   Neck:   Supple, symmetrical, trachea midline, no carotid    bruit or JVD   Lungs:     Clear to auscultation bilaterally, respirations unlabored   Chest Wall:    No tenderness or deformity    Heart:    Regular " rate and rhythm, S1 and S2 normal, no murmur, rub   or gallop, normal carotid impulse bilaterally without bruit.   Abdomen:     Soft, non-tender   Extremities:   Extremities normal, atraumatic, no cyanosis or edema   Pulses:   2+ and symmetric all extremities   Skin:   Skin color, texture, turgor normal, no rashes or lesions       Diagnostic Data:  Procedures  Lab Results   Component Value Date    CHLPL 181 10/14/2020    TRIG 173 (H) 08/29/2022    HDL 65 (H) 08/29/2022     Lab Results   Component Value Date    GLUCOSE 72 07/20/2023    BUN 7 (L) 07/20/2023    CREATININE 0.64 07/20/2023     07/20/2023    K 4.1 07/20/2023     07/20/2023    CO2 21.0 (L) 07/20/2023     Lab Results   Component Value Date    HGBA1C 4.60 (L) 07/18/2023     Lab Results   Component Value Date    WBC 3.51 07/20/2023    HGB 11.9 (L) 07/20/2023    HCT 38.2 07/20/2023     (L) 07/20/2023       Allergies  Allergies   Allergen Reactions    Pravachol [Pravastatin Sodium] Myalgia    Statins Myalgia    Zocor [Simvastatin] Myalgia    Aspirin Rash    Bactrim [Sulfamethoxazole-Trimethoprim] Rash    Crestor [Rosuvastatin Calcium] Myalgia    Lipitor [Atorvastatin] Myalgia       Current Medications    Current Outpatient Medications:     Aflibercept (Eylea) 2 MG/0.05ML solution, by Intravitreal route As Needed., Disp: , Rfl:     amLODIPine (NORVASC) 5 MG tablet, TAKE 1 TABLET BY MOUTH EVERY DAY, Disp: 90 tablet, Rfl: 0    carbidopa-levodopa (Sinemet)  MG per tablet, Take 1.5 tablets by mouth 4 (Four) Times a Day for 30 days. (Patient taking differently: Take 1 tablet by mouth 4 (Four) Times a Day.), Disp: 180 tablet, Rfl: 6    chlorthalidone (HYGROTON) 25 MG tablet, TAKE 1 TABLET BY MOUTH EVERY DAY Oral for 30, Disp: , Rfl:     cinacalcet (SENSIPAR) 60 MG tablet, Take 1 tablet by mouth Daily., Disp: , Rfl:     cloNIDine (Catapres) 0.1 MG tablet, Take 1 tablet by mouth Every 8 (Eight) Hours As Needed for High Blood Pressure (BP  >180/100 mm Hg)., Disp: 10 tablet, Rfl: 5    denosumab (Prolia) 60 MG/ML solution prefilled syringe syringe, Inject 1 mL under the skin into the appropriate area as directed 1 (One) Time. 2 TIMES A YEAR, Disp: , Rfl:     famotidine (Pepcid) 20 MG tablet, Take 1 tablet by mouth 2 (Two) Times a Day., Disp: , Rfl:     irbesartan (AVAPRO) 150 MG tablet, TAKE 1 TABLET BY MOUTH EVERY DAY, Disp: 90 tablet, Rfl: 1    levothyroxine (Synthroid) 25 MCG tablet, Take 1 tablet by mouth Daily., Disp: 90 tablet, Rfl: 1    nebivolol (BYSTOLIC) 10 MG tablet, TAKE 1 TABLET BY MOUTH EVERY DAY, Disp: 90 tablet, Rfl: 0    nitroglycerin (NITROSTAT) 0.4 MG SL tablet, Place 1 tablet under the tongue Every 5 (Five) Minutes As Needed for Chest Pain. Take no more than 3 doses in 15 minutes., Disp: 25 tablet, Rfl: 5    ondansetron ODT (ZOFRAN-ODT) 4 MG disintegrating tablet, Place 1 tablet on the tongue Every 8 (Eight) Hours As Needed for Nausea or Vomiting., Disp: 20 tablet, Rfl: 0    Repatha SureClick solution auto-injector SureClick injection, Inject 1 mL under the skin into the appropriate area as directed Every 14 (Fourteen) Days., Disp: 6 mL, Rfl: 3    sucralfate (Carafate) 1 g tablet, Take 1 tablet by mouth 4 (Four) Times a Day for 30 days., Disp: 120 tablet, Rfl: 0    aspirin 81 MG tablet, Take 1 tablet by mouth Daily. (Patient not taking: Reported on 7/25/2023), Disp: , Rfl:     Azilsartan Medoxomil (Edarbi) 40 MG tablet, Oral for 90 (Patient not taking: Reported on 7/25/2023), Disp: , Rfl:           ROS  Review of Systems   Cardiovascular:  Positive for chest pain, dyspnea on exertion, leg swelling and palpitations.   Respiratory:  Positive for shortness of breath and snoring. Negative for cough and sputum production.      SOCIAL HX  Social History     Socioeconomic History    Marital status:    Tobacco Use    Smoking status: Never    Smokeless tobacco: Never   Vaping Use    Vaping Use: Never used   Substance and Sexual  Activity    Alcohol use: No    Drug use: No    Sexual activity: Not Currently     Partners: Male       FAMILY HX  Family History   Problem Relation Age of Onset    Anemia Mother     Arthritis Mother     Heart disease Mother     Hypertension Mother     Stroke Mother     Broken bones Mother     Osteoporosis Mother     Heart attack Mother     Cancer Father         stomach    Breast cancer Neg Hx     Ovarian cancer Neg Hx     Colon cancer Neg Hx     Endometrial cancer Neg Hx              Rick Qiu III, MD, FACC

## 2023-07-27 LAB
QT INTERVAL: 472 MS
QTC INTERVAL: 442 MS

## 2023-08-14 ENCOUNTER — OFFICE VISIT (OUTPATIENT)
Dept: NEUROLOGY | Facility: CLINIC | Age: 83
End: 2023-08-14
Payer: MEDICARE

## 2023-08-14 VITALS
DIASTOLIC BLOOD PRESSURE: 76 MMHG | HEIGHT: 61 IN | BODY MASS INDEX: 19.65 KG/M2 | SYSTOLIC BLOOD PRESSURE: 136 MMHG | OXYGEN SATURATION: 97 % | HEART RATE: 60 BPM | WEIGHT: 104.06 LBS

## 2023-08-14 DIAGNOSIS — G20 PARKINSON'S DISEASE: Primary | ICD-10-CM

## 2023-08-14 PROCEDURE — 3075F SYST BP GE 130 - 139MM HG: CPT | Performed by: PSYCHIATRY & NEUROLOGY

## 2023-08-14 PROCEDURE — 3078F DIAST BP <80 MM HG: CPT | Performed by: PSYCHIATRY & NEUROLOGY

## 2023-08-14 PROCEDURE — 99214 OFFICE O/P EST MOD 30 MIN: CPT | Performed by: PSYCHIATRY & NEUROLOGY

## 2023-08-14 NOTE — PROGRESS NOTES
Subjective:    CC: Nicolle Chavez is in clinic today for follow up for history of Parkinson's disease.    HPI:  Initial : 6/29/22: Patient is a 81-year-old female with past medical history of hypertension, hyperlipidemia, coronary artery disease referred to clinic for evaluation of tremors.  She reports that she started noticing tremors 6 to 8 months ago and it has slowly become slightly worse.  She reports that tremors are present in her left hand.  She reports that tremors are present when she is holding cup of coffee or glass of water, while using spoon or fork and is also present while resting.  She denies any head tremors.  There is no family history of tremors.  She denies any difficulty with walking.  No change in voice quality.  No episodes of acting out dreams at night.  She does report reduced ability to smell and taste in last few years.  No problems with handwriting.    Follow up: 9/13/2022: She is in clinic for regular follow-up.  Since her initial visit in June 2022, finally insurance did not approve DaTscan and it was completed in August.  It did show a pattern consistent with Parkinson's disease with reduced uptake on right more than left basal ganglia.\    Follow-up: 11/7/2022: She is in clinic for regular follow-up.  Since her last visit in September 2022, she has been on Sinemet  mg tablet, 1 tablet 3 times a day for 3 weeks ago.  Initially, she did feel nauseous but its improved significantly now.  She reports no change in her tremors.  She reports that she has not noticed any change as far as the symptoms are concerned.    Follow-up: 2/13/2023: She is in clinic for regular follow-up.  Since her last visit in November 2022, she reports that overall tremor frequency and intensity has remained same even after increasing Sinemet dose to 1 and half tablet 3 times a day.  She denies any side effects with Sinemet use for the most part but some days, she does get nausea.  She is currently  taking last dose after gap of 6 to 7 hours.    Follow-up: 8/14/2023: She is in clinic for regular follow-up.  Since her last visit 6 months ago, she reports that it was difficult for her to break Sinemet into half so she is taking 1 tablet 4 times a day.  She was initially taking 1 and half tablet 3 times a day.  She reports that she cannot make out if she has had any improvement in her tremors or other symptoms.  She is tolerating Sinemet well without any side effects.      The following portions of the patient's history were reviewed and updated as of 08/14/2023: allergies, social history, and problem list.       Current Outpatient Medications:     Aflibercept (Eylea) 2 MG/0.05ML solution, by Intravitreal route As Needed., Disp: , Rfl:     amLODIPine (NORVASC) 5 MG tablet, TAKE 1 TABLET BY MOUTH EVERY DAY, Disp: 90 tablet, Rfl: 0    chlorthalidone (HYGROTON) 25 MG tablet, TAKE 1 TABLET BY MOUTH EVERY DAY Oral for 30, Disp: , Rfl:     cinacalcet (SENSIPAR) 60 MG tablet, Take 1 tablet by mouth Daily., Disp: , Rfl:     cloNIDine (Catapres) 0.1 MG tablet, Take 1 tablet by mouth Every 8 (Eight) Hours As Needed for High Blood Pressure (BP >180/100 mm Hg)., Disp: 10 tablet, Rfl: 5    denosumab (Prolia) 60 MG/ML solution prefilled syringe syringe, Inject 1 mL under the skin into the appropriate area as directed 1 (One) Time. 2 TIMES A YEAR, Disp: , Rfl:     famotidine (Pepcid) 20 MG tablet, Take 1 tablet by mouth 2 (Two) Times a Day., Disp: , Rfl:     irbesartan (AVAPRO) 150 MG tablet, TAKE 1 TABLET BY MOUTH EVERY DAY, Disp: 90 tablet, Rfl: 1    levothyroxine (Synthroid) 25 MCG tablet, Take 1 tablet by mouth Daily., Disp: 90 tablet, Rfl: 1    nebivolol (BYSTOLIC) 10 MG tablet, TAKE 1 TABLET BY MOUTH EVERY DAY, Disp: 90 tablet, Rfl: 0    nitroglycerin (NITROSTAT) 0.4 MG SL tablet, Place 1 tablet under the tongue Every 5 (Five) Minutes As Needed for Chest Pain. Take no more than 3 doses in 15 minutes., Disp: 25 tablet, Rfl:  5    ondansetron ODT (ZOFRAN-ODT) 4 MG disintegrating tablet, Place 1 tablet on the tongue Every 8 (Eight) Hours As Needed for Nausea or Vomiting., Disp: 20 tablet, Rfl: 0    Repatha SureClick solution auto-injector SureClick injection, Inject 1 mL under the skin into the appropriate area as directed Every 14 (Fourteen) Days., Disp: 6 mL, Rfl: 3    sucralfate (Carafate) 1 g tablet, Take 1 tablet by mouth 4 (Four) Times a Day for 30 days., Disp: 120 tablet, Rfl: 0    Azilsartan Medoxomil (Edarbi) 40 MG tablet, , Disp: , Rfl:     carbidopa-levodopa (Sinemet)  MG per tablet, Take 1.5 tablets by mouth 4 (Four) Times a Day for 30 days. (Patient taking differently: Take 1 tablet by mouth 4 (Four) Times a Day.), Disp: 180 tablet, Rfl: 6   Past Medical History:   Diagnosis Date    Abdominal pain     Last Impression: 26 May 2015  Improving. The patient states she had a history of lower   abdominal pain after taking antibiotics for UTI, but this has improved.  Sanjiv Mak (Gastroenterology)    Abnormal weight gain     Anxiety 12/12/2016    Arthritis of neck     Cataract     Cataract 2014    Cataracts, bilateral     CHF (congestive heart failure)     Congenital heart disease     Coronary artery disease 12/12/2016    Field ST-elevation myocardial infarction, 11/25/2015, with associated chest pain, jaw pain, diaphoresis, and dyspnea; inferior-anterior ST elevation by outside telemetry strips.  Emergent cardiac catheterization, Dr. Isael Rosenbaum:  Status post drug-eluting stent to the proximal right coronary artery using a 2.25 x 23 mm Xience Alpine stent.  Ejection fraction 55% to 60%.     CTS (carpal tunnel syndrome)     Dysphagia 08/15/2016    Last Impression: 26 May 2015  History of intermittent dysphagia symptoms. Differentials     include esophageal dysmotility, Schatzki's ring.  Sanjiv Mak (Gastroenterology)      Fracture of wrist     Gout     2012    Gout 2012    H/O bone density study     Headache   "   Headache     High cholesterol     1995    Hyperparathyroidism     2009    Hypertension     Hypothyroidism     Low back strain     Myalgia and myositis 08/15/2016    Myocardial infarction     Neck strain     Palpitations 09/25/2017    Parathyroid disorder 12/12/2016    with a history of a left parathyroid parathyroidectomy.     Peptic ulcer disease     and bleeding ulcer approximately 12 years ago; incomplete database.     Rotator cuff syndrome 01/2021    Scoliosis       Past Surgical History:   Procedure Laterality Date    APPENDECTOMY      BREAST BIOPSY Right 1995    Ultrasound guided needle biopsy    CARDIAC CATHETERIZATION  06/2022    CAROTID STENT      COLONOSCOPY      CORONARY STENT PLACEMENT      EYE SURGERY Left     HYSTERECTOMY Bilateral 1978    OOPHORECTOMY      OTHER SURGICAL HISTORY      Ulcer repair    PARATHYROIDECTOMY Left     WRIST SURGERY  2012      Family History   Problem Relation Age of Onset    Anemia Mother     Arthritis Mother     Heart disease Mother     Hypertension Mother     Stroke Mother     Broken bones Mother     Osteoporosis Mother     Heart attack Mother     Cancer Father         stomach    Breast cancer Neg Hx     Ovarian cancer Neg Hx     Colon cancer Neg Hx     Endometrial cancer Neg Hx         Review of Systems  Objective:    /76   Pulse 60   Ht 154.9 cm (60.98\")   Wt 47.2 kg (104 lb 0.9 oz)   SpO2 97%   BMI 19.67 kg/mý     Neurology Exam:  General apperance: NAD.     Mental status: Alert, awake and oriented to time place and person.    Language and Speech: No aphasia or dysarthria.    CN II to XII: Intact.    Opthalmoscopic Exam: No papilledema.    Motor:  Right UE muscle strength 5/5. Normal tone.     Left UE muscle strength 5/5. Normal tone.      Right LE muscle strength 5/5. Normal tone.     Left LE muscle strength 5/5. Normal tone.      Sensory: Normal light touch, vibration and pinprick sensation bilaterally.    DTRs: 2+ bilaterally.    Babinski: Negative " bilaterally.    Co-ordination: Normal finger-to-nose, heel to shin B/L.    Mild resting tremors on the left.    Rhomberg: Negative.    Gait: Normal walking stride, normal walking speed.  Most noted in left hand while walking.    Cardiovascular: Regular rate and rhythm without murmur, gallop or rub.    Assessment and Plan:  1. Parkinson's disease  -She is currently taking Sinemet  mg tablet, 1 tablet 4 times daily.  She denies any side effects with this dose.  She reports that she has not really noticed much change since starting Sinemet but I do feel that her tremors have reduced in intensity and frequency.  Since she does not have any side effects, I am going to increase Sinemet dose to  mg tablet, 2 tablets 4 times daily for better therapeutic effect.  She is doing physical therapy which has helped her so it will be continued otherwise I will see her back in clinic in 6 months for follow-up.       I spent 30 minutes in patient care: Reviewing records prior to the visit, entering orders and documentation and spent more than raza 50% of this time face-to-face in management, instructions and education regarding above mentioned diagnosis and also on counseling and discussing about taking medication regularly, possible side effects with medication use, importance of good sleep hygiene, good hydration and regular exercise.    Return in about 6 months (around 2/14/2024).       Note to patient: The 21st Century Cures Act makes medical notes like these available to patients in the interest of transparency. However, be advised this is a medical document. It is intended as peer to peer communication. It is written in medical language and may contain abbreviations or verbiage that are unfamiliar. It may appear blunt or direct. Medical documents are intended to carry relevant information, facts as evident, and the clinical opinion of the physician.

## 2023-08-17 ENCOUNTER — OUTSIDE FACILITY SERVICE (OUTPATIENT)
Dept: GASTROENTEROLOGY | Facility: CLINIC | Age: 83
End: 2023-08-17
Payer: MEDICARE

## 2023-08-17 PROCEDURE — 99214 OFFICE O/P EST MOD 30 MIN: CPT | Performed by: INTERNAL MEDICINE

## 2023-08-17 PROCEDURE — 43239 EGD BIOPSY SINGLE/MULTIPLE: CPT | Performed by: INTERNAL MEDICINE

## 2023-08-17 PROCEDURE — 88305 TISSUE EXAM BY PATHOLOGIST: CPT

## 2023-08-17 PROCEDURE — 43249 ESOPH EGD DILATION <30 MM: CPT | Performed by: INTERNAL MEDICINE

## 2023-08-17 RX ORDER — OMEPRAZOLE 40 MG/1
40 CAPSULE, DELAYED RELEASE ORAL
Qty: 60 CAPSULE | Refills: 11 | Status: SHIPPED | OUTPATIENT
Start: 2023-08-17

## 2023-08-18 ENCOUNTER — LAB REQUISITION (OUTPATIENT)
Dept: LAB | Facility: HOSPITAL | Age: 83
End: 2023-08-18
Payer: MEDICARE

## 2023-08-18 DIAGNOSIS — K44.9 DIAPHRAGMATIC HERNIA WITHOUT OBSTRUCTION OR GANGRENE: ICD-10-CM

## 2023-08-18 DIAGNOSIS — R07.89 OTHER CHEST PAIN: ICD-10-CM

## 2023-08-18 DIAGNOSIS — K21.9 GASTRO-ESOPHAGEAL REFLUX DISEASE WITHOUT ESOPHAGITIS: ICD-10-CM

## 2023-08-18 DIAGNOSIS — R13.10 DYSPHAGIA, UNSPECIFIED: ICD-10-CM

## 2023-08-18 DIAGNOSIS — K22.2 ESOPHAGEAL OBSTRUCTION: ICD-10-CM

## 2023-08-18 DIAGNOSIS — R63.4 ABNORMAL WEIGHT LOSS: ICD-10-CM

## 2023-08-21 LAB — REF LAB TEST METHOD: NORMAL

## 2023-09-05 RX ORDER — NEBIVOLOL 10 MG/1
10 TABLET ORAL DAILY
Qty: 90 TABLET | Refills: 1 | Status: SHIPPED | OUTPATIENT
Start: 2023-09-05

## 2023-11-16 ENCOUNTER — OFFICE VISIT (OUTPATIENT)
Dept: GASTROENTEROLOGY | Facility: CLINIC | Age: 83
End: 2023-11-16
Payer: MEDICARE

## 2023-11-16 VITALS — WEIGHT: 111 LBS | TEMPERATURE: 97.3 F | BODY MASS INDEX: 20.96 KG/M2 | HEIGHT: 61 IN | HEART RATE: 74 BPM

## 2023-11-16 DIAGNOSIS — K21.9 GASTROESOPHAGEAL REFLUX DISEASE WITHOUT ESOPHAGITIS: Primary | ICD-10-CM

## 2023-11-16 PROCEDURE — 1159F MED LIST DOCD IN RCRD: CPT | Performed by: NURSE PRACTITIONER

## 2023-11-16 PROCEDURE — 1160F RVW MEDS BY RX/DR IN RCRD: CPT | Performed by: NURSE PRACTITIONER

## 2023-11-16 PROCEDURE — 99213 OFFICE O/P EST LOW 20 MIN: CPT | Performed by: NURSE PRACTITIONER

## 2023-11-16 RX ORDER — FAMOTIDINE 20 MG/1
TABLET, FILM COATED ORAL
COMMUNITY

## 2023-11-16 RX ORDER — MULTIPLE VITAMINS W/ MINERALS TAB 9MG-400MCG
TAB ORAL
COMMUNITY

## 2023-11-16 NOTE — PROGRESS NOTES
GASTROENTEROLOGY OFFICE NOTE  Nicolle Chavez  [id number]  1940    CARE TEAM  Patient Care Team:  Caren Recinos MD as PCP - General (Internal Medicine)    Referring Provider: Caren Recinos MD    Chief Complaint   Patient presents with    Difficulty Swallowing        HISTORY OF PRESENT ILLNESS:  Patient is a 83-year-old female presenting today status post EGD for complaints of atypical chest pain and intermittent dysphagia to solids.  She described to Dr. Benson prior to EGD, she was having to chew carefully, eat slowly and drink a lot of water with meals; otherwise, solid foods get stuck or hung up and she did have to bring them up from time to time.  She also describes some nausea and vomiting, but the vomiting seemed to occur when she was at the table and maybe that she was having a food impaction in her esophagus that she was throwing up.  She was not vomiting hours after eating.    EGD revealed a very obvious circumferential ringlike stricture which was biopsied and dilated to 20 mm.  Beyond a small sliding hiatal hernia, stomach and duodenum were unremarkable.  She was started on omeprazole 40 mg twice daily.    She returns today reporting that she has had no further chest pain.  Her swallowing is much improved.  If she takes a larger pill such as a calcium pill it does seem to be a little slow to go down although does not feel like it is getting stuck.  Very dry breads are somewhat problematic for her still.  She is having a hard time getting in both doses of the PPI.  She is routinely taking it once daily but often times misses the second dose.        PAST MEDICAL HISTORY  Past Medical History:   Diagnosis Date    Abdominal pain     Last Impression: 26 May 2015  Improving. The patient states she had a history of lower   abdominal pain after taking antibiotics for UTI, but this has improved.  Sanjiv Mak (Gastroenterology)    Abnormal weight gain     Anxiety 12/12/2016    Arthritis  of neck     Cataract     Cataract 2014    Cataracts, bilateral     CHF (congestive heart failure)     Congenital heart disease     Coronary artery disease 12/12/2016    Field ST-elevation myocardial infarction, 11/25/2015, with associated chest pain, jaw pain, diaphoresis, and dyspnea; inferior-anterior ST elevation by outside telemetry strips.  Emergent cardiac catheterization, Dr. Isael Rosenbaum:  Status post drug-eluting stent to the proximal right coronary artery using a 2.25 x 23 mm Xience Alpine stent.  Ejection fraction 55% to 60%.     CTS (carpal tunnel syndrome)     Dysphagia 08/15/2016    Last Impression: 26 May 2015  History of intermittent dysphagia symptoms. Differentials     include esophageal dysmotility, Schatzki's ring.  Sanjiv Mak (Gastroenterology)      Fracture of wrist     Gout     2012    Gout 2012    H/O bone density study     Headache     Headache     High cholesterol     1995    Hyperparathyroidism     2009    Hypertension     Hypothyroidism     Low back strain     Myalgia and myositis 08/15/2016    Myocardial infarction     Neck strain     Palpitations 09/25/2017    Parathyroid disorder 12/12/2016    with a history of a left parathyroid parathyroidectomy.     Peptic ulcer disease     and bleeding ulcer approximately 12 years ago; incomplete database.     Rotator cuff syndrome 01/2021    Scoliosis         PAST SURGICAL HISTORY  Past Surgical History:   Procedure Laterality Date    APPENDECTOMY      BREAST BIOPSY Right 1995    Ultrasound guided needle biopsy    CARDIAC CATHETERIZATION  06/2022    CAROTID STENT      COLONOSCOPY      CORONARY STENT PLACEMENT      EYE SURGERY Left     HYSTERECTOMY Bilateral 1978    OOPHORECTOMY      OTHER SURGICAL HISTORY      Ulcer repair    PARATHYROIDECTOMY Left     WRIST SURGERY  2012        MEDICATIONS:    Current Outpatient Medications:     Aflibercept (Eylea) 2 MG/0.05ML solution, by Intravitreal route As Needed., Disp: , Rfl:     amLODIPine  (NORVASC) 5 MG tablet, TAKE 1 TABLET BY MOUTH EVERY DAY, Disp: 90 tablet, Rfl: 0    chlorthalidone (HYGROTON) 25 MG tablet, TAKE 1 TABLET BY MOUTH EVERY DAY Oral for 30, Disp: , Rfl:     cinacalcet (SENSIPAR) 60 MG tablet, Take 1 tablet by mouth Daily., Disp: , Rfl:     cloNIDine (Catapres) 0.1 MG tablet, Take 1 tablet by mouth Every 8 (Eight) Hours As Needed for High Blood Pressure (BP >180/100 mm Hg)., Disp: 10 tablet, Rfl: 5    denosumab (Prolia) 60 MG/ML solution prefilled syringe syringe, Inject 1 mL under the skin into the appropriate area as directed 1 (One) Time. 2 TIMES A YEAR, Disp: , Rfl:     irbesartan (AVAPRO) 150 MG tablet, TAKE 1 TABLET BY MOUTH EVERY DAY, Disp: 90 tablet, Rfl: 1    levothyroxine (Synthroid) 25 MCG tablet, Take 1 tablet by mouth Daily., Disp: 90 tablet, Rfl: 1    multivitamin with minerals (OCUVITE-LUTEIN PO), , Disp: , Rfl:     nebivolol (BYSTOLIC) 10 MG tablet, Take 1 tablet by mouth Daily., Disp: 90 tablet, Rfl: 1    nitroglycerin (NITROSTAT) 0.4 MG SL tablet, Place 1 tablet under the tongue Every 5 (Five) Minutes As Needed for Chest Pain. Take no more than 3 doses in 15 minutes., Disp: 25 tablet, Rfl: 5    ondansetron ODT (ZOFRAN-ODT) 4 MG disintegrating tablet, Place 1 tablet on the tongue Every 8 (Eight) Hours As Needed for Nausea or Vomiting., Disp: 20 tablet, Rfl: 0    Repatha SureClick solution auto-injector SureClick injection, Inject 1 mL under the skin into the appropriate area as directed Every 14 (Fourteen) Days., Disp: 6 mL, Rfl: 3    Azilsartan Medoxomil (Edarbi) 40 MG tablet, , Disp: , Rfl:     carbidopa-levodopa (Sinemet)  MG per tablet, Take 2 tablets by mouth 4 (Four) Times a Day for 30 days., Disp: 240 tablet, Rfl: 6    famotidine (PEPCID) 20 MG tablet, Take 1 tablet twice a day by oral route. (Patient not taking: Reported on 11/16/2023), Disp: , Rfl:     omeprazole (priLOSEC) 40 MG capsule, Take 1 capsule by mouth 2 (Two) Times a Day Before Meals. Take a  "half hour before breakfast (Patient not taking: Reported on 11/16/2023), Disp: 60 capsule, Rfl: 11    ALLERGIES  Allergies   Allergen Reactions    Pravachol [Pravastatin Sodium] Myalgia    Statins Myalgia    Zocor [Simvastatin] Myalgia    Hydrocodone Unknown - Low Severity    Aspirin Rash     ulcer    Bactrim [Sulfamethoxazole-Trimethoprim] Rash    Crestor [Rosuvastatin Calcium] Myalgia    Lipitor [Atorvastatin] Myalgia       FAMILY HISTORY:  Family History   Problem Relation Age of Onset    Anemia Mother     Arthritis Mother     Heart disease Mother     Hypertension Mother     Stroke Mother     Broken bones Mother     Osteoporosis Mother     Heart attack Mother     Cancer Father         stomach    Breast cancer Neg Hx     Ovarian cancer Neg Hx     Colon cancer Neg Hx     Endometrial cancer Neg Hx        SOCIAL HISTORY  Social History     Socioeconomic History    Marital status:    Tobacco Use    Smoking status: Never     Passive exposure: Never    Smokeless tobacco: Never   Vaping Use    Vaping Use: Never used   Substance and Sexual Activity    Alcohol use: No    Drug use: No    Sexual activity: Not Currently     Partners: Male         PHYSICAL EXAM   Pulse 74   Temp 97.3 °F (36.3 °C) (Temporal)   Ht 154.9 cm (60.98\")   Wt 50.3 kg (111 lb)   BMI 20.99 kg/m²   Physical Exam  Constitutional:       Appearance: Normal appearance.   HENT:      Head: Normocephalic and atraumatic.   Pulmonary:      Effort: Pulmonary effort is normal.   Neurological:      Mental Status: She is alert and oriented to person, place, and time.   Psychiatric:         Mood and Affect: Mood normal.         Thought Content: Thought content normal.           Results Review:  [facility]  [address] [place] [address]  Phone: [phone] or [phone]  Fax: [phone]  Dionicio Herr M.D., Medical Director    PATIENT NAME                                     LABORATORY NO.  [address] [place] [address], " "SNEHADARRYL CH                         [id number]  [id number]                                 AGE                    SEX   SSN              CLIENT REF #  [facility]                   82        1940      F     [id number]      [id number]    [address]                      REQUESTING M.D.           ATTENDING [place] [address]                        SHAKA HERNANDEZ REBECCA ADALBERTO  DATE COLLECTED            DATE RECEIVED          DATE REPORTED  2023    DIAGNOSIS:  ESOPHAGUS, BIOPSY:  Squamous mucosa with reactive changes  Negative for significantly increased eosinophils, dysplasia, or malignancy    GJK    CLINICAL HISTORY:  Diaphragmatic hernia without obstruction or gangrene, esophageal obstruction,  other chest pain, dysphagia, unspecified, abnormal weight loss    SPECIMENS RECEIVED:  ESOPHAGUS, BIOPSY    MICROSCOPIC DESCRIPTION:  Tissue blocks are prepared and slides are examined microscopically on all  specimens. See diagnosis for details.    Professional interpretation rendered by Devan De La O M.D., F.C.A.P. at [facility], [address] [place] [address].    GROSS DESCRIPTION:  Labeled \"esophagus\".  Consists of multiple pieces of tan soft tissue measuring  0.6 x 0.4 x 0.2 cm in aggregate and is submitted entirely in 1 block.  ADALBERTO    REVIEWED, DIAGNOSED AND ELECTRONICALLY  SIGNED BY:    Devan De La O M.D., F.C.A.P.     ASSESSMENT / PLAN  Diagnoses and all orders for this visit:    1. Gastroesophageal reflux disease without esophagitis (Primary)    -Continue omeprazole 40 mg twice daily    Return if symptoms worsen or fail to improve.    I discussed the patients findings and my recommendations with patient    Tigre José, CHUCHO                    "

## 2023-12-27 ENCOUNTER — TELEPHONE (OUTPATIENT)
Dept: NEUROLOGY | Facility: CLINIC | Age: 83
End: 2023-12-27
Payer: MEDICARE

## 2023-12-27 NOTE — TELEPHONE ENCOUNTER
Caller: SNEHA     See call back number: [phone]         What was the call regarding: PT SAID INSURANCE TOLD HER NEEDS A NOTE FROM OUR OFFICE STATING SHE NEEDS TO STAY ON  NAME BRAND CARB/LEV RX. ? THEY ARE WANTING HER TO TRY SOMETHING ELSE?  DISCONNECTED NAIDA/ O ANSWER WHEN TRIED TO CALL BACK     PLEASE CALL PT TO ADVISE WHAT NEEDS TO BE DONE .     Is it okay if the provider responds through MyChart: CALL    PLEASE ADVISE

## 2023-12-28 NOTE — TELEPHONE ENCOUNTER
"Relay     \"LVM with Nicolle to see what is needed. Is she currently only taking brand name Sinemet and needs a letter from us stating why? If so, why can she only take the brand? I didn't see notes in chart. What did the letter from the insurance say? Does the med need a PA? I don't expect as it isn't one that often requires authorization \"                    "

## 2024-02-16 ENCOUNTER — OFFICE VISIT (OUTPATIENT)
Dept: NEUROLOGY | Facility: CLINIC | Age: 84
End: 2024-02-16
Payer: MEDICARE

## 2024-02-16 VITALS
WEIGHT: 108 LBS | OXYGEN SATURATION: 96 % | SYSTOLIC BLOOD PRESSURE: 122 MMHG | BODY MASS INDEX: 20.39 KG/M2 | HEART RATE: 69 BPM | HEIGHT: 61 IN | DIASTOLIC BLOOD PRESSURE: 82 MMHG

## 2024-02-16 DIAGNOSIS — G20.A1 PARKINSON'S DISEASE WITHOUT DYSKINESIA OR FLUCTUATING MANIFESTATIONS: Primary | ICD-10-CM

## 2024-02-16 PROCEDURE — 3074F SYST BP LT 130 MM HG: CPT | Performed by: PSYCHIATRY & NEUROLOGY

## 2024-02-16 PROCEDURE — 99214 OFFICE O/P EST MOD 30 MIN: CPT | Performed by: PSYCHIATRY & NEUROLOGY

## 2024-02-16 PROCEDURE — 3079F DIAST BP 80-89 MM HG: CPT | Performed by: PSYCHIATRY & NEUROLOGY

## 2024-02-16 RX ORDER — PAROXETINE 10 MG/1
10 TABLET, FILM COATED ORAL DAILY
COMMUNITY
Start: 2023-11-17

## 2024-02-27 ENCOUNTER — OFFICE VISIT (OUTPATIENT)
Dept: CARDIOLOGY | Facility: CLINIC | Age: 84
End: 2024-02-27
Payer: MEDICARE

## 2024-02-27 VITALS
HEIGHT: 62 IN | DIASTOLIC BLOOD PRESSURE: 70 MMHG | HEART RATE: 90 BPM | BODY MASS INDEX: 19.88 KG/M2 | SYSTOLIC BLOOD PRESSURE: 128 MMHG | OXYGEN SATURATION: 98 % | WEIGHT: 108 LBS

## 2024-02-27 DIAGNOSIS — I25.10 CORONARY ARTERY DISEASE INVOLVING NATIVE CORONARY ARTERY OF NATIVE HEART WITHOUT ANGINA PECTORIS: Primary | ICD-10-CM

## 2024-02-27 DIAGNOSIS — E78.5 HYPERLIPIDEMIA LDL GOAL <100: ICD-10-CM

## 2024-02-27 DIAGNOSIS — I10 ESSENTIAL HYPERTENSION: ICD-10-CM

## 2024-02-27 PROCEDURE — 3074F SYST BP LT 130 MM HG: CPT | Performed by: INTERNAL MEDICINE

## 2024-02-27 PROCEDURE — 99214 OFFICE O/P EST MOD 30 MIN: CPT | Performed by: INTERNAL MEDICINE

## 2024-02-27 PROCEDURE — 3078F DIAST BP <80 MM HG: CPT | Performed by: INTERNAL MEDICINE

## 2024-02-27 NOTE — PROGRESS NOTES
[facility]  Office visit  Nicolle Chavez  1940  [phone]    VISIT DATE:  2/27/2024      PCP: Caren Recinos MD  [address] [facility] [address]    CC:  Chief Complaint   Patient presents with    Coronary Artery Disease       PROBLEM LIST:  Coronary artery disease:  Field ST-elevation myocardial infarction, 11/25/2015, with associated chest pain, jaw pain, diaphoresis, and dyspnea; inferior-anterior ST elevation by outside telemetry strips.   Emergent cardiac catheterization, Dr. Isael Rosenbaum: Status post drug-eluting stent to the proximal right coronary artery using a 2.25 x 23 mm Xience Alpine stent.   Ejection fraction 55% to 60%.   Uncontrolled Essential HTN.  Dyspnea.   Mixed Dyslipidemia.   Hypothyroidism, on chronic supplementation.   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   Lower extremity edema.   History of peptic ulcer disease and bleeding ulcer approximately 12 years ago; incomplete database.   Family history of coronary disease.   Anxiety.   Gout.   Surgical history:  Hysterectomy.   Left parathyroidectomy.  Ulcer repair.       Previous cardiac studies and procedures:  October 2019 2-week ambulatory ECG monitor:  Rare PACs which are intermittently symptomatic. Episodes of nonsustained atrial tachycardia.    July 2023  TTE    Left ventricular systolic function is normal. Calculated left ventricular EF = 63% Left ventricular ejection fraction appears to be 61 - 65%.    Left ventricular wall thickness is consistent with mild septal asymmetric hypertrophy. Sigmoid-shaped ventricular septum is present.    Left ventricular diastolic function was normal.    Mild aortic valve regurgitation    Estimated right ventricular systolic pressure from tricuspid regurgitation is normal (<35 mmHg). Calculated right ventricular systolic pressure from tricuspid regurgitation is 32 mmHg.  Myocardial perfusion imaging    REST EF =  "78% STRESS EF = 77%.    Myocardial perfusion imaging indicates a normal myocardial perfusion study with no evidence of ischemia.    ASSESSMENT:   Diagnosis Plan   1. Coronary artery disease involving native coronary artery of native heart without angina pectoris        2. Essential hypertension        3. Hyperlipidemia LDL goal <100            PLAN:  Coronary artery disease: Stable and asymptomatic with recent reassuring perfusion imaging.  Continue current medical therapy.      Hyperlipidemia: Significant improvement in overall cholesterol profile after initiation of PCSK9 inhibitor.  Continue current medical therapy.      Hypertension: Goal less than 130/80 mmHg. Did not tolerate thiazide diuretics.  Continue amlodipine 5 mg, nebivolol 10 mg every morning, continue clonidine 0.1 mg p.o. as needed for blood pressures greater than 180/100 mmHg.  Continue irbesartan 150 mg p.o. nightly..  Continue to keep home blood pressure log.    Symptomatic PACs: Currently well controlled.  Continue beta-blockade.  Known episodes of asymptomatic nonsustained atrial tachycardia.    Subjective  Denies palpitations.  Still with persistent nausea and anorexia.  Previous GI evaluation revealed esophageal stricture status post dilation.  Now undergoing therapy for Parkinson's.  Blood pressures running less than 130/80 mmHg.    PHYSICAL EXAMINATION:  Vitals:    02/27/24 1312   Weight: 49 kg (108 lb)   Height: 157.5 cm (62\")       General Appearance:    Alert, cooperative, no distress, appears stated age   Head:    Normocephalic, without obvious abnormality, atraumatic   Eyes:    conjunctiva/corneas clear   Nose:   Nares normal, septum midline, mucosa normal, no drainage   Throat:   Lips, teeth and gums normal   Neck:   Supple, symmetrical, trachea midline, no carotid    bruit or JVD   Lungs:     Clear to auscultation bilaterally, respirations unlabored   Chest Wall:    No tenderness or deformity    Heart:    Regular rate and rhythm, S1 " and S2 normal, no murmur, rub   or gallop, normal carotid impulse bilaterally without bruit.   Abdomen:     Soft, non-tender   Extremities:   Extremities normal, atraumatic, no cyanosis or edema   Pulses:   2+ and symmetric all extremities   Skin:   Skin color, texture, turgor normal, no rashes or lesions       Diagnostic Data:  Procedures  Lab Results   Component Value Date    CHLPL 181 10/14/2020    TRIG 173 (H) 08/29/2022    HDL 65 (H) 08/29/2022     Lab Results   Component Value Date    GLUCOSE 72 07/20/2023    BUN 7 (L) 07/20/2023    CREATININE 0.64 07/20/2023     07/20/2023    K 4.1 07/20/2023     07/20/2023    CO2 21.0 (L) 07/20/2023     Lab Results   Component Value Date    HGBA1C 4.60 (L) 07/18/2023     Lab Results   Component Value Date    WBC 3.51 07/20/2023    HGB 11.9 (L) 07/20/2023    HCT 38.2 07/20/2023     (L) 07/20/2023       Allergies  Allergies   Allergen Reactions    Pravachol [Pravastatin Sodium] Myalgia    Statins Myalgia    Zocor [Simvastatin] Myalgia    Hydrocodone Unknown - Low Severity    Aspirin Rash     ulcer    Bactrim [Sulfamethoxazole-Trimethoprim] Rash    Crestor [Rosuvastatin Calcium] Myalgia    Lipitor [Atorvastatin] Myalgia       Current Medications    Current Outpatient Medications:     Aflibercept (Eylea) 2 MG/0.05ML solution, by Intravitreal route As Needed., Disp: , Rfl:     amLODIPine (NORVASC) 5 MG tablet, TAKE 1 TABLET BY MOUTH EVERY DAY, Disp: 90 tablet, Rfl: 0    Azilsartan Medoxomil (Edarbi) 40 MG tablet, Daily., Disp: , Rfl:     carbidopa-levodopa (Sinemet)  MG per tablet, Take 2 tablets by mouth 4 (Four) Times a Day for 30 days., Disp: 240 tablet, Rfl: 6    chlorthalidone (HYGROTON) 25 MG tablet, TAKE 1 TABLET BY MOUTH EVERY DAY Oral for 30, Disp: , Rfl:     cinacalcet (SENSIPAR) 60 MG tablet, Take 1 tablet by mouth Daily., Disp: , Rfl:     cloNIDine (Catapres) 0.1 MG tablet, Take 1 tablet by mouth Every 8 (Eight) Hours As Needed for High Blood  Pressure (BP >180/100 mm Hg)., Disp: 10 tablet, Rfl: 5    denosumab (Prolia) 60 MG/ML solution prefilled syringe syringe, Inject 1 mL under the skin into the appropriate area as directed 1 (One) Time. 2 TIMES A YEAR, Disp: , Rfl:     famotidine (PEPCID) 20 MG tablet, , Disp: , Rfl:     irbesartan (AVAPRO) 150 MG tablet, TAKE 1 TABLET BY MOUTH EVERY DAY, Disp: 90 tablet, Rfl: 1    levothyroxine (Synthroid) 25 MCG tablet, Take 1 tablet by mouth Daily., Disp: 90 tablet, Rfl: 1    multivitamin with minerals (OCUVITE-LUTEIN PO), , Disp: , Rfl:     nebivolol (BYSTOLIC) 10 MG tablet, Take 1 tablet by mouth Daily., Disp: 90 tablet, Rfl: 1    nitroglycerin (NITROSTAT) 0.4 MG SL tablet, Place 1 tablet under the tongue Every 5 (Five) Minutes As Needed for Chest Pain. Take no more than 3 doses in 15 minutes., Disp: 25 tablet, Rfl: 5    omeprazole (priLOSEC) 40 MG capsule, Take 1 capsule by mouth 2 (Two) Times a Day Before Meals. Take a half hour before breakfast, Disp: 60 capsule, Rfl: 11    ondansetron ODT (ZOFRAN-ODT) 4 MG disintegrating tablet, Place 1 tablet on the tongue Every 8 (Eight) Hours As Needed for Nausea or Vomiting., Disp: 20 tablet, Rfl: 0    PARoxetine (PAXIL) 10 MG tablet, Take 1 tablet by mouth Daily., Disp: , Rfl:     Repatha SureClick solution auto-injector SureClick injection, Inject 1 mL under the skin into the appropriate area as directed Every 14 (Fourteen) Days., Disp: 6 mL, Rfl: 3          ROS  Review of Systems   Cardiovascular:  Positive for chest pain, dyspnea on exertion, leg swelling and palpitations.   Respiratory:  Positive for shortness of breath and snoring. Negative for cough and sputum production.        SOCIAL HX  Social History     Socioeconomic History    Marital status:    Tobacco Use    Smoking status: Never     Passive exposure: Never    Smokeless tobacco: Never   Vaping Use    Vaping Use: Never used   Substance and Sexual Activity    Alcohol use: No    Drug use: No    Sexual  activity: Not Currently     Partners: Male       FAMILY HX  Family History   Problem Relation Age of Onset    Anemia Mother     Arthritis Mother     Heart disease Mother     Hypertension Mother     Stroke Mother     Broken bones Mother     Osteoporosis Mother     Heart attack Mother     Cancer Father         stomach    Breast cancer Neg Hx     Ovarian cancer Neg Hx     Colon cancer Neg Hx     Endometrial cancer Neg Hx              Rick Qiu III, MD, FACC

## 2024-04-17 ENCOUNTER — TELEPHONE (OUTPATIENT)
Dept: CARDIOLOGY | Facility: CLINIC | Age: 84
End: 2024-04-17
Payer: MEDICARE

## 2024-05-10 RX ORDER — NEBIVOLOL 10 MG/1
10 TABLET ORAL DAILY
Qty: 90 TABLET | Refills: 1 | Status: SHIPPED | OUTPATIENT
Start: 2024-05-10

## 2024-06-12 RX ORDER — EVOLOCUMAB 140 MG/ML
INJECTION, SOLUTION SUBCUTANEOUS
Qty: 6 ML | Refills: 3 | Status: SHIPPED | OUTPATIENT
Start: 2024-06-12

## 2024-06-14 ENCOUNTER — TRANSCRIBE ORDERS (OUTPATIENT)
Dept: INFUSION THERAPY | Facility: HOSPITAL | Age: 84
End: 2024-06-14
Payer: MEDICARE

## 2024-06-14 DIAGNOSIS — M81.0 OSTEOPOROSIS, SENILE: Primary | ICD-10-CM

## 2024-07-15 RX ORDER — EPINEPHRINE IN SOD CHLOR,ISO 1 MG/10 ML
0.3 SYRINGE (ML) INTRAVENOUS ONCE AS NEEDED
Status: CANCELLED
Start: 2024-07-15

## 2024-07-15 RX ORDER — DIPHENHYDRAMINE HYDROCHLORIDE 50 MG/ML
50 INJECTION INTRAMUSCULAR; INTRAVENOUS ONCE AS NEEDED
Status: CANCELLED
Start: 2024-07-15

## 2024-07-15 RX ORDER — METHYLPREDNISOLONE SODIUM SUCCINATE 40 MG/ML
40 INJECTION, POWDER, LYOPHILIZED, FOR SOLUTION INTRAMUSCULAR; INTRAVENOUS ONCE AS NEEDED
Status: CANCELLED
Start: 2024-07-16

## 2024-07-15 RX ORDER — DIPHENHYDRAMINE HYDROCHLORIDE 50 MG/ML
50 INJECTION INTRAMUSCULAR; INTRAVENOUS ONCE AS NEEDED
Status: CANCELLED
Start: 2024-07-16

## 2024-07-15 RX ORDER — FAMOTIDINE 10 MG/ML
20 INJECTION, SOLUTION INTRAVENOUS ONCE AS NEEDED
Status: CANCELLED
Start: 2024-07-16

## 2024-07-15 RX ORDER — EPINEPHRINE IN SOD CHLOR,ISO 1 MG/10 ML
0.1 SYRINGE (ML) INTRAVENOUS ONCE AS NEEDED
Status: CANCELLED
Start: 2024-07-16

## 2024-07-15 RX ORDER — ALBUTEROL SULFATE 90 UG/1
2 AEROSOL, METERED RESPIRATORY (INHALATION) EVERY 4 HOURS PRN
Status: CANCELLED
Start: 2024-07-15

## 2024-07-15 RX ORDER — EPINEPHRINE IN SOD CHLOR,ISO 1 MG/10 ML
0.3 SYRINGE (ML) INTRAVENOUS ONCE AS NEEDED
Status: CANCELLED
Start: 2024-07-16

## 2024-07-15 RX ORDER — ALBUTEROL SULFATE 90 UG/1
2 AEROSOL, METERED RESPIRATORY (INHALATION) EVERY 4 HOURS PRN
Status: CANCELLED
Start: 2024-07-16

## 2024-07-15 RX ORDER — FAMOTIDINE 10 MG/ML
20 INJECTION, SOLUTION INTRAVENOUS ONCE AS NEEDED
Status: CANCELLED
Start: 2024-07-15

## 2024-07-15 RX ORDER — EPINEPHRINE IN SOD CHLOR,ISO 1 MG/10 ML
0.1 SYRINGE (ML) INTRAVENOUS ONCE AS NEEDED
Status: CANCELLED
Start: 2024-07-15

## 2024-07-16 ENCOUNTER — HOSPITAL ENCOUNTER (OUTPATIENT)
Dept: INFUSION THERAPY | Facility: HOSPITAL | Age: 84
Discharge: HOME | End: 2024-07-16
Admitting: PHYSICIAN ASSISTANT
Payer: MEDICARE

## 2024-07-16 VITALS
BODY MASS INDEX: 20.12 KG/M2 | WEIGHT: 110 LBS | DIASTOLIC BLOOD PRESSURE: 76 MMHG | SYSTOLIC BLOOD PRESSURE: 138 MMHG | TEMPERATURE: 97.9 F | RESPIRATION RATE: 18 BRPM | HEART RATE: 72 BPM | OXYGEN SATURATION: 97 %

## 2024-07-16 DIAGNOSIS — M81.0 OSTEOPOROSIS, UNSPECIFIED OSTEOPOROSIS TYPE, UNSPECIFIED PATHOLOGICAL FRACTURE PRESENCE: Primary | ICD-10-CM

## 2024-07-16 PROCEDURE — 25010000002 DENOSUMAB 60 MG/ML SOLUTION PREFILLED SYRINGE: Performed by: PHYSICIAN ASSISTANT

## 2024-07-16 PROCEDURE — 96372 THER/PROPH/DIAG INJ SC/IM: CPT

## 2024-07-16 RX ORDER — METHYLPREDNISOLONE SODIUM SUCCINATE 40 MG/ML
40 INJECTION, POWDER, LYOPHILIZED, FOR SOLUTION INTRAMUSCULAR; INTRAVENOUS ONCE AS NEEDED
Start: 2024-07-16

## 2024-07-16 RX ORDER — DIPHENHYDRAMINE HYDROCHLORIDE 50 MG/ML
50 INJECTION INTRAMUSCULAR; INTRAVENOUS ONCE AS NEEDED
Status: CANCELLED
Start: 2024-07-16

## 2024-07-16 RX ORDER — DIPHENHYDRAMINE HYDROCHLORIDE 50 MG/ML
50 INJECTION INTRAMUSCULAR; INTRAVENOUS ONCE AS NEEDED
Status: DISCONTINUED | OUTPATIENT
Start: 2024-07-16 | End: 2024-07-18 | Stop reason: HOSPADM

## 2024-07-16 RX ORDER — EPINEPHRINE IN SOD CHLOR,ISO 1 MG/10 ML
0.3 SYRINGE (ML) INTRAVENOUS ONCE AS NEEDED
Status: CANCELLED
Start: 2024-07-16

## 2024-07-16 RX ORDER — FAMOTIDINE 10 MG/ML
20 INJECTION, SOLUTION INTRAVENOUS ONCE AS NEEDED
Status: DISCONTINUED | OUTPATIENT
Start: 2024-07-16 | End: 2024-07-18 | Stop reason: HOSPADM

## 2024-07-16 RX ORDER — EPINEPHRINE 1 MG/ML
0.1 INJECTION, SOLUTION INTRAMUSCULAR; SUBCUTANEOUS ONCE AS NEEDED
Status: DISCONTINUED | OUTPATIENT
Start: 2024-07-16 | End: 2024-07-18 | Stop reason: HOSPADM

## 2024-07-16 RX ORDER — ALBUTEROL SULFATE 90 UG/1
2 AEROSOL, METERED RESPIRATORY (INHALATION) EVERY 4 HOURS PRN
Status: DISCONTINUED | OUTPATIENT
Start: 2024-07-16 | End: 2024-07-18 | Stop reason: HOSPADM

## 2024-07-16 RX ORDER — EPINEPHRINE 1 MG/ML
0.3 INJECTION, SOLUTION INTRAMUSCULAR; SUBCUTANEOUS ONCE AS NEEDED
Status: DISCONTINUED | OUTPATIENT
Start: 2024-07-16 | End: 2024-07-18 | Stop reason: HOSPADM

## 2024-07-16 RX ORDER — FAMOTIDINE 10 MG/ML
20 INJECTION, SOLUTION INTRAVENOUS ONCE AS NEEDED
Status: CANCELLED
Start: 2024-07-16

## 2024-07-16 RX ORDER — ALBUTEROL SULFATE 90 UG/1
2 AEROSOL, METERED RESPIRATORY (INHALATION) EVERY 4 HOURS PRN
Status: CANCELLED
Start: 2024-07-16

## 2024-07-16 RX ORDER — EPINEPHRINE IN SOD CHLOR,ISO 1 MG/10 ML
0.1 SYRINGE (ML) INTRAVENOUS ONCE AS NEEDED
Status: CANCELLED
Start: 2024-07-16

## 2024-07-16 RX ADMIN — DENOSUMAB 60 MG: 60 INJECTION SUBCUTANEOUS at 14:31

## 2024-07-16 NOTE — CODE DOCUMENTATION
Prolia education provided.  Patient states she takes a calcium supplement but does not take vitamin D currently.

## 2024-08-08 ENCOUNTER — TRANSCRIBE ORDERS (OUTPATIENT)
Dept: LAB | Facility: HOSPITAL | Age: 84
End: 2024-08-08
Payer: MEDICARE

## 2024-08-08 ENCOUNTER — LAB (OUTPATIENT)
Dept: LAB | Facility: HOSPITAL | Age: 84
End: 2024-08-08
Payer: MEDICARE

## 2024-08-08 DIAGNOSIS — M81.0 OSTEOPOROSIS, UNSPECIFIED OSTEOPOROSIS TYPE, UNSPECIFIED PATHOLOGICAL FRACTURE PRESENCE: Primary | ICD-10-CM

## 2024-08-08 DIAGNOSIS — M81.0 OSTEOPOROSIS, UNSPECIFIED OSTEOPOROSIS TYPE, UNSPECIFIED PATHOLOGICAL FRACTURE PRESENCE: ICD-10-CM

## 2024-08-08 PROCEDURE — 36415 COLL VENOUS BLD VENIPUNCTURE: CPT

## 2024-08-08 PROCEDURE — 80048 BASIC METABOLIC PNL TOTAL CA: CPT

## 2024-08-09 LAB
ANION GAP SERPL CALCULATED.3IONS-SCNC: 12.8 MMOL/L (ref 5–15)
BUN SERPL-MCNC: 11 MG/DL (ref 8–23)
BUN/CREAT SERPL: 13.8 (ref 7–25)
CALCIUM SPEC-SCNC: 10 MG/DL (ref 8.6–10.5)
CHLORIDE SERPL-SCNC: 105 MMOL/L (ref 98–107)
CO2 SERPL-SCNC: 24.2 MMOL/L (ref 22–29)
CREAT SERPL-MCNC: 0.8 MG/DL (ref 0.57–1)
EGFRCR SERPLBLD CKD-EPI 2021: 72.3 ML/MIN/1.73
GLUCOSE SERPL-MCNC: 89 MG/DL (ref 65–99)
POTASSIUM SERPL-SCNC: 4.8 MMOL/L (ref 3.5–5.2)
SODIUM SERPL-SCNC: 142 MMOL/L (ref 136–145)

## 2024-08-16 ENCOUNTER — OFFICE VISIT (OUTPATIENT)
Dept: NEUROLOGY | Facility: CLINIC | Age: 84
End: 2024-08-16
Payer: MEDICARE

## 2024-08-16 VITALS — HEART RATE: 70 BPM | SYSTOLIC BLOOD PRESSURE: 130 MMHG | OXYGEN SATURATION: 98 % | DIASTOLIC BLOOD PRESSURE: 84 MMHG

## 2024-08-16 DIAGNOSIS — G20.A1 PARKINSON'S DISEASE WITHOUT DYSKINESIA OR FLUCTUATING MANIFESTATIONS: Primary | ICD-10-CM

## 2024-08-16 PROCEDURE — 99214 OFFICE O/P EST MOD 30 MIN: CPT | Performed by: PSYCHIATRY & NEUROLOGY

## 2024-08-16 PROCEDURE — 3075F SYST BP GE 130 - 139MM HG: CPT | Performed by: PSYCHIATRY & NEUROLOGY

## 2024-08-16 PROCEDURE — 1159F MED LIST DOCD IN RCRD: CPT | Performed by: PSYCHIATRY & NEUROLOGY

## 2024-08-16 PROCEDURE — 3079F DIAST BP 80-89 MM HG: CPT | Performed by: PSYCHIATRY & NEUROLOGY

## 2024-08-16 PROCEDURE — 1160F RVW MEDS BY RX/DR IN RCRD: CPT | Performed by: PSYCHIATRY & NEUROLOGY

## 2024-08-16 NOTE — PROGRESS NOTES
Subjective:    CC: Nicolle Chavez is in clinic today for follow up for history of tremor dominant Parkinson's.    HPI:  Initial : 6/29/22: Patient is a 81-year-old female with past medical history of hypertension, hyperlipidemia, coronary artery disease referred to clinic for evaluation of tremors.  She reports that she started noticing tremors 6 to 8 months ago and it has slowly become slightly worse.  She reports that tremors are present in her left hand.  She reports that tremors are present when she is holding cup of coffee or glass of water, while using spoon or fork and is also present while resting.  She denies any head tremors.  There is no family history of tremors.  She denies any difficulty with walking.  No change in voice quality.  No episodes of acting out dreams at night.  She does report reduced ability to smell and taste in last few years.  No problems with handwriting.    Follow up: 9/13/2022: She is in clinic for regular follow-up.  Since her initial visit in June 2022, finally insurance did not approve DaTscan and it was completed in August.  It did show a pattern consistent with Parkinson's disease with reduced uptake on right more than left basal ganglia.\    Follow-up: 11/7/2022: She is in clinic for regular follow-up.  Since her last visit in September 2022, she has been on Sinemet  mg tablet, 1 tablet 3 times a day for 3 weeks ago.  Initially, she did feel nauseous but its improved significantly now.  She reports no change in her tremors.  She reports that she has not noticed any change as far as the symptoms are concerned.    Follow-up: 2/13/2023: She is in clinic for regular follow-up.  Since her last visit in November 2022, she reports that overall tremor frequency and intensity has remained same even after increasing Sinemet dose to 1 and half tablet 3 times a day.  She denies any side effects with Sinemet use for the most part but some days, she does get nausea.  She is  currently taking last dose after gap of 6 to 7 hours.    Follow-up: 8/14/2023: She is in clinic for regular follow-up.  Since her last visit 6 months ago, she reports that it was difficult for her to break Sinemet into half so she is taking 1 tablet 4 times a day.  She was initially taking 1 and half tablet 3 times a day.  She reports that she cannot make out if she has had any improvement in her tremors or other symptoms.  She is tolerating Sinemet well without any side effects.    Follow-up: 2/16/2024: She is in clinic for regular follow-up.  Since her last visit in August 2023, she is now taking Sinemet  milligram tablet,2 tablets 4 times a day.  She denies any side effects.  She reports only minimal reduction in tremors.  She also is reporting that she seems to be more short tempered than before in last 2 months.  She reports poor sleep quality.    Follow-up: 8/16/2024: She is in clinic for regular follow-up.  Since her last visit in February 2024, she has been taking Sinemet  mg tablet, 2 tablets 4 times a day.  However she has developed significant nausea after each dose of Sinemet and can last for 3 to 4 hours.  It is really affected her quality of life.  She reports that as far as tremors are controlled, they remain stable under good control with Sinemet use.    The following portions of the patient's history were reviewed and updated as of 08/16/2024: allergies, social history, and problem list.       Current Outpatient Medications:     Aflibercept (Eylea) 2 MG/0.05ML solution, by Intravitreal route As Needed., Disp: , Rfl:     amLODIPine (NORVASC) 5 MG tablet, TAKE 1 TABLET BY MOUTH EVERY DAY, Disp: 90 tablet, Rfl: 0    carbidopa-levodopa (Sinemet)  MG per tablet, Take 2 tablets by mouth 4 (Four) Times a Day for 30 days., Disp: 240 tablet, Rfl: 6    chlorthalidone (HYGROTON) 25 MG tablet, TAKE 1 TABLET BY MOUTH EVERY DAY Oral for 30, Disp: , Rfl:     cinacalcet (SENSIPAR) 60 MG tablet,  Take 1 tablet by mouth Daily., Disp: , Rfl:     cloNIDine (Catapres) 0.1 MG tablet, Take 1 tablet by mouth Every 8 (Eight) Hours As Needed for High Blood Pressure (BP >180/100 mm Hg)., Disp: 10 tablet, Rfl: 5    denosumab (Prolia) 60 MG/ML solution prefilled syringe syringe, Inject 1 mL under the skin into the appropriate area as directed 1 (One) Time. 2 TIMES A YEAR, Disp: , Rfl:     famotidine (PEPCID) 20 MG tablet, , Disp: , Rfl:     irbesartan (AVAPRO) 150 MG tablet, TAKE 1 TABLET BY MOUTH EVERY DAY, Disp: 90 tablet, Rfl: 1    levothyroxine (Synthroid) 25 MCG tablet, Take 1 tablet by mouth Daily., Disp: 90 tablet, Rfl: 1    multivitamin with minerals (OCUVITE-LUTEIN PO), , Disp: , Rfl:     nebivolol (BYSTOLIC) 10 MG tablet, TAKE 1 TABLET BY MOUTH DAILY, Disp: 90 tablet, Rfl: 1    nitroglycerin (NITROSTAT) 0.4 MG SL tablet, Place 1 tablet under the tongue Every 5 (Five) Minutes As Needed for Chest Pain. Take no more than 3 doses in 15 minutes., Disp: 25 tablet, Rfl: 5    omeprazole (priLOSEC) 40 MG capsule, Take 1 capsule by mouth 2 (Two) Times a Day Before Meals. Take a half hour before breakfast, Disp: 60 capsule, Rfl: 11    ondansetron ODT (ZOFRAN-ODT) 4 MG disintegrating tablet, Place 1 tablet on the tongue Every 8 (Eight) Hours As Needed for Nausea or Vomiting., Disp: 20 tablet, Rfl: 0    PARoxetine (PAXIL) 10 MG tablet, Take 1 tablet by mouth Daily., Disp: , Rfl:     Repatha SureClick solution auto-injector SureClick injection, ADMINISTER 1 ML UNDER THE SKIN EVERY 14 DAYS AS DIRECTED, Disp: 6 mL, Rfl: 3   Past Medical History:   Diagnosis Date    Abdominal pain     Last Impression: 26 May 2015  Improving. The patient states she had a history of lower   abdominal pain after taking antibiotics for UTI, but this has improved.  Sanjiv Mak (Gastroenterology)    Abnormal weight gain     Anxiety 12/12/2016    Arthritis of neck     Cataract     Cataract 2014    Cataracts, bilateral     CHF (congestive heart  failure)     Congenital heart disease     Coronary artery disease 12/12/2016    Field ST-elevation myocardial infarction, 11/25/2015, with associated chest pain, jaw pain, diaphoresis, and dyspnea; inferior-anterior ST elevation by outside telemetry strips.  Emergent cardiac catheterization, Dr. Isael Rosenbaum:  Status post drug-eluting stent to the proximal right coronary artery using a 2.25 x 23 mm Xience Alpine stent.  Ejection fraction 55% to 60%.     CTS (carpal tunnel syndrome)     Dysphagia 08/15/2016    Last Impression: 26 May 2015  History of intermittent dysphagia symptoms. Differentials     include esophageal dysmotility, Schatzki's ring.  Sanjiv Mak (Gastroenterology)      Fracture of wrist     Gout     2012    Gout 2012    H/O bone density study     Headache     Headache     High cholesterol     1995    Hyperparathyroidism     2009    Hypertension     Hypothyroidism     Low back strain     Myalgia and myositis 08/15/2016    Myocardial infarction     Neck strain     Palpitations 09/25/2017    Parathyroid disorder 12/12/2016    with a history of a left parathyroid parathyroidectomy.     Parkinson's disease     Peptic ulcer disease     and bleeding ulcer approximately 12 years ago; incomplete database.     Rotator cuff syndrome 01/2021    Scoliosis       Past Surgical History:   Procedure Laterality Date    APPENDECTOMY      BREAST BIOPSY Right 1995    Ultrasound guided needle biopsy    CARDIAC CATHETERIZATION  06/2022    CAROTID STENT      COLONOSCOPY      CORONARY STENT PLACEMENT      ENDOSCOPY      EYE SURGERY Left     HYSTERECTOMY Bilateral 1978    OOPHORECTOMY      OTHER SURGICAL HISTORY      Ulcer repair    PARATHYROIDECTOMY Left     WRIST SURGERY  2012      Family History   Problem Relation Age of Onset    Anemia Mother     Arthritis Mother     Heart disease Mother     Hypertension Mother     Stroke Mother     Broken bones Mother     Osteoporosis Mother     Heart attack Mother     Cancer  Father         stomach    Breast cancer Neg Hx     Ovarian cancer Neg Hx     Colon cancer Neg Hx     Endometrial cancer Neg Hx         Review of Systems  Objective:    /84   Pulse 70   SpO2 98%     Neurology Exam:  General apperance: NAD.     Mental status: Alert, awake and oriented to time place and person.    Language and Speech: No aphasia or dysarthria.    CN II to XII: Intact.    Opthalmoscopic Exam: No papilledema.    Motor:  Right UE muscle strength 5/5. Normal tone.     Left UE muscle strength 5/5. Normal tone.      Right LE muscle strength 5/5. Normal tone.     Left LE muscle strength 5/5. Normal tone.      Sensory: Normal light touch, vibration and pinprick sensation bilaterally.    DTRs: 2+ bilaterally.    Babinski: Negative bilaterally.    Co-ordination: Normal finger-to-nose, heel to shin B/L.    Mild resting tremor of right hand noted.    Rhomberg: Negative.    Gait: Normal.  Normal walking stride, walking speed.  Normal arm swing.    Cardiovascular: Regular rate and rhythm without murmur, gallop or rub.    Assessment and Plan:  1. Parkinson's disease without dyskinesia or fluctuating manifestations  Overall tremors are stable without any worsening.  She is reporting significant nausea with Sinemet  mg tablet, 2 tablets 4 times daily.  Nausea sometimes can last for 3 to 4 hours.  I advised her to reduce the dose of Sinemet to 1.5 tablets 4 times a day and also told her to take Sinemet with either jenny cracker/soda or sabrina tea to reduce nausea.  If this does not help then my plan would be to add Lodosyn/carbidopa alone to reduce nausea.  Otherwise I will see her back in clinic in 6 months for follow-up.       I spent 30 minutes in patient care: Reviewing records prior to the visit, entering orders and documentation and spent more than raza 50% of this time face-to-face in management, instructions and education regarding above mentioned diagnosis and also on counseling and discussing  about taking medication regularly, possible side effects with medication use, importance of good sleep hygiene, good hydration and regular exercise.    Return in about 6 months (around 2/16/2025).       Note to patient: The 21st Century Cures Act makes medical notes like these available to patients in the interest of transparency. However, be advised this is a medical document. It is intended as peer to peer communication. It is written in medical language and may contain abbreviations or verbiage that are unfamiliar. It may appear blunt or direct. Medical documents are intended to carry relevant information, facts as evident, and the clinical opinion of the physician.

## 2024-12-05 ENCOUNTER — TELEPHONE (OUTPATIENT)
Dept: CARDIOLOGY | Facility: CLINIC | Age: 84
End: 2024-12-05
Payer: MEDICARE

## 2024-12-05 NOTE — TELEPHONE ENCOUNTER
PA came in for Repatha from [facility]. PA initiated today. Key # [id number]. Case ID [id number]. Approved 11/5/24 Ends 12/5/25. Pharmacy notified of approval.

## 2025-01-17 ENCOUNTER — HOSPITAL ENCOUNTER (OUTPATIENT)
Facility: HOSPITAL | Age: 85
Discharge: HOME | End: 2025-01-17
Payer: MEDICARE

## 2025-02-18 ENCOUNTER — OFFICE VISIT (OUTPATIENT)
Dept: NEUROLOGY | Facility: CLINIC | Age: 85
End: 2025-02-18
Payer: MEDICARE

## 2025-02-18 VITALS — OXYGEN SATURATION: 99 % | HEART RATE: 60 BPM | SYSTOLIC BLOOD PRESSURE: 156 MMHG | DIASTOLIC BLOOD PRESSURE: 86 MMHG

## 2025-02-18 DIAGNOSIS — G20.A1 PARKINSON'S DISEASE WITHOUT DYSKINESIA OR FLUCTUATING MANIFESTATIONS: Primary | ICD-10-CM

## 2025-02-18 PROCEDURE — 1160F RVW MEDS BY RX/DR IN RCRD: CPT | Performed by: PSYCHIATRY & NEUROLOGY

## 2025-02-18 PROCEDURE — 99214 OFFICE O/P EST MOD 30 MIN: CPT | Performed by: PSYCHIATRY & NEUROLOGY

## 2025-02-18 PROCEDURE — 1159F MED LIST DOCD IN RCRD: CPT | Performed by: PSYCHIATRY & NEUROLOGY

## 2025-02-18 RX ORDER — CARBIDOPA AND LEVODOPA 25; 100 MG/1; MG/1
1.5 TABLET ORAL 4 TIMES DAILY
Qty: 540 TABLET | Refills: 1 | Status: SHIPPED | OUTPATIENT
Start: 2025-02-18 | End: 2025-05-19

## 2025-02-18 NOTE — PROGRESS NOTES
Subjective:    CC: Nicolle Chavez is in clinic today for follow up for  Tremor dominant Parkinson's.    HPI:  Initial : 6/29/22: Patient is a 81-year-old female with past medical history of hypertension, hyperlipidemia, coronary artery disease referred to clinic for evaluation of tremors.  She reports that she started noticing tremors 6 to 8 months ago and it has slowly become slightly worse.  She reports that tremors are present in her left hand.  She reports that tremors are present when she is holding cup of coffee or glass of water, while using spoon or fork and is also present while resting.  She denies any head tremors.  There is no family history of tremors.  She denies any difficulty with walking.  No change in voice quality.  No episodes of acting out dreams at night.  She does report reduced ability to smell and taste in last few years.  No problems with handwriting.    Follow up: 9/13/2022: She is in clinic for regular follow-up.  Since her initial visit in June 2022, finally insurance did not approve DaTscan and it was completed in August.  It did show a pattern consistent with Parkinson's disease with reduced uptake on right more than left basal ganglia.\    Follow-up: 11/7/2022: She is in clinic for regular follow-up.  Since her last visit in September 2022, she has been on Sinemet  mg tablet, 1 tablet 3 times a day for 3 weeks ago.  Initially, she did feel nauseous but its improved significantly now.  She reports no change in her tremors.  She reports that she has not noticed any change as far as the symptoms are concerned.    Follow-up: 2/13/2023: She is in clinic for regular follow-up.  Since her last visit in November 2022, she reports that overall tremor frequency and intensity has remained same even after increasing Sinemet dose to 1 and half tablet 3 times a day.  She denies any side effects with Sinemet use for the most part but some days, she does get nausea.  She is currently  taking last dose after gap of 6 to 7 hours.    Follow-up: 8/14/2023: She is in clinic for regular follow-up.  Since her last visit 6 months ago, she reports that it was difficult for her to break Sinemet into half so she is taking 1 tablet 4 times a day.  She was initially taking 1 and half tablet 3 times a day.  She reports that she cannot make out if she has had any improvement in her tremors or other symptoms.  She is tolerating Sinemet well without any side effects.    Follow-up: 2/16/2024: She is in clinic for regular follow-up.  Since her last visit in August 2023, she is now taking Sinemet  milligram tablet,2 tablets 4 times a day.  She denies any side effects.  She reports only minimal reduction in tremors.  She also is reporting that she seems to be more short tempered than before in last 2 months.  She reports poor sleep quality.    Follow-up: 8/16/2024: She is in clinic for regular follow-up.  Since her last visit in February 2024, she has been taking Sinemet  mg tablet, 2 tablets 4 times a day.  However she has developed significant nausea after each dose of Sinemet and can last for 3 to 4 hours.  It is really affected her quality of life.  She reports that as far as tremors are controlled, they remain stable under good control with Sinemet use.    Follow-up: 2/18/2025: She is in clinic for regular follow-up.  Since her last visit 6 months ago, she has reduced Sinemet  mg tablet to 1.5 tabs 4 times a day from 2 tabs 4 times daily dosing.  She reports significant improvement in nausea with almost complete resolution of nausea with Sinemet use.  Tremors are under good control for the most part.  She denies any wearing off from Sinemet.    The following portions of the patient's history were reviewed and updated as of 02/18/2025: allergies, social history, and problem list.       Current Outpatient Medications:     Aflibercept (Eylea) 2 MG/0.05ML solution, by Intravitreal route As Needed.,  Disp: , Rfl:     amLODIPine (NORVASC) 5 MG tablet, TAKE 1 TABLET BY MOUTH EVERY DAY, Disp: 90 tablet, Rfl: 0    carbidopa-levodopa (Sinemet)  MG per tablet, Take 2 tablets by mouth 4 (Four) Times a Day for 30 days. (Patient taking differently: Take 2 tablets by mouth 4 (Four) Times a Day. 1.5-2 tabs 4 times a day (depending on nausea)), Disp: 240 tablet, Rfl: 6    chlorthalidone (HYGROTON) 25 MG tablet, TAKE 1 TABLET BY MOUTH EVERY DAY Oral for 30, Disp: , Rfl:     cinacalcet (SENSIPAR) 60 MG tablet, Take 1 tablet by mouth Daily., Disp: , Rfl:     cloNIDine (Catapres) 0.1 MG tablet, Take 1 tablet by mouth Every 8 (Eight) Hours As Needed for High Blood Pressure (BP >180/100 mm Hg)., Disp: 10 tablet, Rfl: 5    denosumab (Prolia) 60 MG/ML solution prefilled syringe syringe, Inject 1 mL under the skin into the appropriate area as directed 1 (One) Time. 2 TIMES A YEAR, Disp: , Rfl:     famotidine (PEPCID) 20 MG tablet, , Disp: , Rfl:     irbesartan (AVAPRO) 150 MG tablet, TAKE 1 TABLET BY MOUTH EVERY DAY, Disp: 90 tablet, Rfl: 1    levothyroxine (Synthroid) 25 MCG tablet, Take 1 tablet by mouth Daily., Disp: 90 tablet, Rfl: 1    multivitamin with minerals (OCUVITE-LUTEIN PO), Take 1 tablet by mouth Daily., Disp: , Rfl:     nebivolol (BYSTOLIC) 10 MG tablet, TAKE 1 TABLET BY MOUTH DAILY, Disp: 90 tablet, Rfl: 1    nitroglycerin (NITROSTAT) 0.4 MG SL tablet, Place 1 tablet under the tongue Every 5 (Five) Minutes As Needed for Chest Pain. Take no more than 3 doses in 15 minutes., Disp: 25 tablet, Rfl: 5    omeprazole (priLOSEC) 40 MG capsule, Take 1 capsule by mouth 2 (Two) Times a Day Before Meals. Take a half hour before breakfast, Disp: 60 capsule, Rfl: 11    ondansetron ODT (ZOFRAN-ODT) 4 MG disintegrating tablet, Place 1 tablet on the tongue Every 8 (Eight) Hours As Needed for Nausea or Vomiting., Disp: 20 tablet, Rfl: 0    PARoxetine (PAXIL) 10 MG tablet, Take 1 tablet by mouth Daily., Disp: , Rfl:     Repatha  SureClick solution auto-injector SureClick injection, ADMINISTER 1 ML UNDER THE SKIN EVERY 14 DAYS AS DIRECTED, Disp: 6 mL, Rfl: 3   Past Medical History:   Diagnosis Date    Abdominal pain     Last Impression: 26 May 2015  Improving. The patient states she had a history of lower   abdominal pain after taking antibiotics for UTI, but this has improved.  Sanjiv Mak (Gastroenterology)    Abnormal weight gain     Anxiety 12/12/2016    Arthritis of neck     Cataract     Cataract 2014    Cataracts, bilateral     CHF (congestive heart failure)     Congenital heart disease     Coronary artery disease 12/12/2016    Field ST-elevation myocardial infarction, 11/25/2015, with associated chest pain, jaw pain, diaphoresis, and dyspnea; inferior-anterior ST elevation by outside telemetry strips.  Emergent cardiac catheterization, Dr. Isael Rosenbaum:  Status post drug-eluting stent to the proximal right coronary artery using a 2.25 x 23 mm Xience Alpine stent.  Ejection fraction 55% to 60%.     CTS (carpal tunnel syndrome)     Dysphagia 08/15/2016    Last Impression: 26 May 2015  History of intermittent dysphagia symptoms. Differentials     include esophageal dysmotility, Schatzki's ring.  Sanjiv Mak (Gastroenterology)      Fracture of wrist     Gout     2012    Gout 2012    H/O bone density study     Headache     Headache     High cholesterol     1995    Hyperparathyroidism     2009    Hypertension     Hypothyroidism     Low back strain     Myalgia and myositis 08/15/2016    Myocardial infarction     Neck strain     Palpitations 09/25/2017    Parathyroid disorder 12/12/2016    with a history of a left parathyroid parathyroidectomy.     Parkinson's disease     Peptic ulcer disease     and bleeding ulcer approximately 12 years ago; incomplete database.     Rotator cuff syndrome 01/2021    Scoliosis       Past Surgical History:   Procedure Laterality Date    APPENDECTOMY      BREAST BIOPSY Right 1995    Ultrasound  guided needle biopsy    CARDIAC CATHETERIZATION  06/2022    CAROTID STENT      COLONOSCOPY      CORONARY STENT PLACEMENT      ENDOSCOPY      EYE SURGERY Left     HYSTERECTOMY Bilateral 1978    OOPHORECTOMY      OTHER SURGICAL HISTORY      Ulcer repair    PARATHYROIDECTOMY Left     WRIST SURGERY  2012      Family History   Problem Relation Age of Onset    Anemia Mother     Arthritis Mother     Heart disease Mother     Hypertension Mother     Stroke Mother     Broken bones Mother     Osteoporosis Mother     Heart attack Mother     Cancer Father         stomach    Breast cancer Neg Hx     Ovarian cancer Neg Hx     Colon cancer Neg Hx     Endometrial cancer Neg Hx         Review of Systems  Objective:    /86   Pulse 60   SpO2 99%     Neurology Exam:  General apperance: NAD.     Mental status: Alert, awake and oriented to time place and person.    Language and Speech: No aphasia or dysarthria.    CN II to XII: Intact.    Opthalmoscopic Exam: No papilledema.    Motor:  Right UE muscle strength 5/5. Normal tone.     Left UE muscle strength 5/5. Normal tone.      Right LE muscle strength 5/5. Normal tone.     Left LE muscle strength 5/5. Normal tone.      Sensory: Normal light touch, vibration and pinprick sensation bilaterally.    DTRs: 2+ bilaterally.    Babinski: Negative bilaterally.    Co-ordination: Normal finger-to-nose, heel to shin B/L.    Mild resting tremors noted bilaterally.    Rhomberg: Negative.    Gait: Normal.    Cardiovascular: Regular rate and rhythm without murmur, gallop or rub.    Assessment and Plan:  1. Parkinson's disease without dyskinesia or fluctuating manifestations  She has done much better with reduced dose of Sinemet  mg tablet, 1.5 tabs 4 times daily and nausea has resolved almost completely.  She was experiencing significant nausea with 2 tablets 4 times daily dosing.  Tremors are well-controlled with this dose Ajovy continued.  Otherwise I will see her back in clinic in 6  months for follow-up.    I spent 30 minutes in patient care: Reviewing records prior to the visit, entering orders and documentation and spent more than raza 50% of this time face-to-face in management, instructions and education regarding above mentioned diagnosis and also on counseling and discussing about taking medication regularly, possible side effects with medication use, importance of good sleep hygiene, good hydration and regular exercise.    No follow-ups on file.       Note to patient: The 21st Century Cures Act makes medical notes like these available to patients in the interest of transparency. However, be advised this is a medical document. It is intended as peer to peer communication. It is written in medical language and may contain abbreviations or verbiage that are unfamiliar. It may appear blunt or direct. Medical documents are intended to carry relevant information, facts as evident, and the clinical opinion of the physician.

## 2025-03-03 ENCOUNTER — TRANSCRIBE ORDERS (OUTPATIENT)
Dept: LAB | Facility: HOSPITAL | Age: 85
End: 2025-03-03
Payer: MEDICARE

## 2025-03-03 DIAGNOSIS — M81.8 OTHER OSTEOPOROSIS WITHOUT CURRENT PATHOLOGICAL FRACTURE: Primary | ICD-10-CM

## 2025-03-11 ENCOUNTER — OFFICE VISIT (OUTPATIENT)
Dept: CARDIOLOGY | Facility: CLINIC | Age: 85
End: 2025-03-11
Payer: MEDICARE

## 2025-03-11 ENCOUNTER — LAB (OUTPATIENT)
Facility: HOSPITAL | Age: 85
End: 2025-03-11
Payer: MEDICARE

## 2025-03-11 VITALS
WEIGHT: 117.5 LBS | OXYGEN SATURATION: 100 % | DIASTOLIC BLOOD PRESSURE: 84 MMHG | HEIGHT: 62 IN | HEART RATE: 72 BPM | SYSTOLIC BLOOD PRESSURE: 134 MMHG | BODY MASS INDEX: 21.62 KG/M2

## 2025-03-11 DIAGNOSIS — I25.10 CORONARY ARTERY DISEASE INVOLVING NATIVE CORONARY ARTERY OF NATIVE HEART WITHOUT ANGINA PECTORIS: Primary | ICD-10-CM

## 2025-03-11 DIAGNOSIS — E78.5 HYPERLIPIDEMIA LDL GOAL <100: ICD-10-CM

## 2025-03-11 DIAGNOSIS — I10 ESSENTIAL HYPERTENSION: ICD-10-CM

## 2025-03-11 PROCEDURE — 83735 ASSAY OF MAGNESIUM: CPT | Performed by: INTERNAL MEDICINE

## 2025-03-11 PROCEDURE — 36415 COLL VENOUS BLD VENIPUNCTURE: CPT | Performed by: INTERNAL MEDICINE

## 2025-03-11 PROCEDURE — 85025 COMPLETE CBC W/AUTO DIFF WBC: CPT | Performed by: INTERNAL MEDICINE

## 2025-03-11 PROCEDURE — 80061 LIPID PANEL: CPT | Performed by: INTERNAL MEDICINE

## 2025-03-11 PROCEDURE — 80053 COMPREHEN METABOLIC PANEL: CPT | Performed by: INTERNAL MEDICINE

## 2025-03-11 RX ORDER — NITROGLYCERIN 0.4 MG/1
0.4 TABLET SUBLINGUAL
Qty: 25 TABLET | Refills: 5 | Status: SHIPPED | OUTPATIENT
Start: 2025-03-11

## 2025-03-11 RX ORDER — CLOPIDOGREL BISULFATE 75 MG/1
75 TABLET ORAL DAILY
Qty: 90 TABLET | Refills: 3 | Status: SHIPPED | OUTPATIENT
Start: 2025-03-11

## 2025-03-11 NOTE — PROGRESS NOTES
[facility]  Office visit  Nicolle Chavez  1940  [phone]    VISIT DATE:  3/11/2025      PCP: Caren Recinos MD  [address] [facility] [id number]    CC:  Chief Complaint   Patient presents with    Coronary artery disease involving native coronary artery of    Essential hypertension       PROBLEM LIST:  Coronary artery disease:  Field ST-elevation myocardial infarction, 11/25/2015, with associated chest pain, jaw pain, diaphoresis, and dyspnea; inferior-anterior ST elevation by outside telemetry strips.   Emergent cardiac catheterization, Dr. Isael Rosenbaum: Status post drug-eluting stent to the proximal right coronary artery using a 2.25 x 23 mm Xience Alpine stent.   Ejection fraction 55% to 60%.   Uncontrolled Essential HTN.  Dyspnea.   Mixed Dyslipidemia.   Hypothyroidism, on chronic supplementation.   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   Lower extremity edema.   History of peptic ulcer disease and bleeding ulcer approximately 12 years ago; incomplete database.   Family history of coronary disease.   Anxiety.   Gout.   Surgical history:  Hysterectomy.   Left parathyroidectomy.  Ulcer repair.       Previous cardiac studies and procedures:  October 2019 2-week ambulatory ECG monitor:  Rare PACs which are intermittently symptomatic. Episodes of nonsustained atrial tachycardia.    July 2023  TTE    Left ventricular systolic function is normal. Calculated left ventricular EF = 63% Left ventricular ejection fraction appears to be 61 - 65%.    Left ventricular wall thickness is consistent with mild septal asymmetric hypertrophy. Sigmoid-shaped ventricular septum is present.    Left ventricular diastolic function was normal.    Mild aortic valve regurgitation    Estimated right ventricular systolic pressure from tricuspid regurgitation is normal (<35 mmHg). Calculated right ventricular systolic pressure from tricuspid  regurgitation is 32 mmHg.  Myocardial perfusion imaging    REST EF = 78% STRESS EF = 77%.    Myocardial perfusion imaging indicates a normal myocardial perfusion study with no evidence of ischemia.    ASSESSMENT:   Diagnosis Plan   1. Coronary artery disease involving native coronary artery of native heart without angina pectoris        2. Essential hypertension  Magnesium    CBC & Differential      3. Hyperlipidemia LDL goal <100  Comprehensive Metabolic Panel    Lipid Panel          PLAN:  Coronary artery disease:Continue current medical therapy.  Recent episode of atypical angina.  Continue current medical therapy.  Will consider repeat noninvasive ischemia evaluation if recurrent.  Instructed to use.  Sublingual nitroglycerin.  Intolerant to aspirin, starting clopidogrel 75 mg p.o. daily.    Hyperlipidemia: Significant improvement in overall cholesterol profile after initiation of PCSK9 inhibitor.  Continue current medical therapy.      Hypertension: Goal less than 130/80 mmHg. Did not tolerate thiazide diuretics.  Continue amlodipine 5 mg, nebivolol 10 mg every morning, continue clonidine 0.1 mg p.o. as needed for blood pressures greater than 180/100 mmHg.  Continue irbesartan 150 mg p.o. nightly..  Continue to keep home blood pressure log.    Symptomatic PACs: Currently well controlled.  Continue beta-blockade.  Known episodes of asymptomatic nonsustained atrial tachycardia.    Subjective  Denies palpitations.  Previous GI evaluation revealed esophageal stricture status post dilation.  Now undergoing therapy for Parkinson's.  Blood pressures running less than 130/80 mmHg.  Recent episode of precordial chest discomfort which woke her from sleep last week.  Mild to moderate intensity.  Lasted less than 30 minutes.  It appeared to have some change with position in bed.  She had 2 milder briefer episodes since  then before resolving completely.    PHYSICAL EXAMINATION:  Vitals:    03/11/25 1010   BP: 134/84   BP  "Location: Right arm   Patient Position: Sitting   Cuff Size: Adult   Pulse: 72   SpO2: 100%   Weight: 53.3 kg (117 lb 8 oz)   Height: 157.5 cm (62.01\")       General Appearance:    Alert, cooperative, no distress, appears stated age   Head:    Normocephalic, without obvious abnormality, atraumatic   Eyes:    conjunctiva/corneas clear   Nose:   Nares normal, septum midline, mucosa normal, no drainage   Throat:   Lips, teeth and gums normal   Neck:   Supple, symmetrical, trachea midline, no carotid    bruit or JVD   Lungs:     Clear to auscultation bilaterally, respirations unlabored   Chest Wall:    No tenderness or deformity    Heart:    Regular rate and rhythm, S1 and S2 normal, no murmur, rub   or gallop, normal carotid impulse bilaterally without bruit.   Abdomen:     Soft, non-tender   Extremities:   Extremities normal, atraumatic, no cyanosis or edema   Pulses:   2+ and symmetric all extremities   Skin:   Skin color, texture, turgor normal, no rashes or lesions       Diagnostic Data:    ECG 12 Lead    Date/Time: 3/11/2025 10:54 AM  Performed by: Rick Qiu III, MD    Authorized by: Rick Qiu III, MD  Comparison: compared with previous ECG from 7/19/2023  Similar to previous ECG  Rhythm: sinus rhythm    Clinical impression: normal ECG        Lab Results   Component Value Date    CHLPL 181 10/14/2020    TRIG 173 (H) 08/29/2022    HDL 65 (H) 08/29/2022     Lab Results   Component Value Date    GLUCOSE 89 08/08/2024    BUN 11 08/08/2024    CREATININE 0.80 08/08/2024     08/08/2024    K 4.8 08/08/2024     08/08/2024    CO2 24.2 08/08/2024     Lab Results   Component Value Date    HGBA1C 4.60 (L) 07/18/2023     Lab Results   Component Value Date    WBC 3.51 07/20/2023    HGB 11.9 (L) 07/20/2023    HCT 38.2 07/20/2023     (L) 07/20/2023       Allergies  Allergies   Allergen Reactions    Pravachol [Pravastatin Sodium] Myalgia    Statins Myalgia    Zocor [Simvastatin] Myalgia    Hydrocodone " Unknown - Low Severity    Aspirin Rash     ulcer    Bactrim [Sulfamethoxazole-Trimethoprim] Rash    Crestor [Rosuvastatin Calcium] Myalgia    Lipitor [Atorvastatin] Myalgia       Current Medications    Current Outpatient Medications:     Aflibercept (Eylea) 2 MG/0.05ML solution, by Intravitreal route As Needed., Disp: , Rfl:     amLODIPine (NORVASC) 5 MG tablet, TAKE 1 TABLET BY MOUTH EVERY DAY, Disp: 90 tablet, Rfl: 0    carbidopa-levodopa (Sinemet)  MG per tablet, Take 1.5 tablets by mouth 4 (Four) Times a Day for 90 days., Disp: 540 tablet, Rfl: 1    chlorthalidone (HYGROTON) 25 MG tablet, TAKE 1 TABLET BY MOUTH EVERY DAY Oral for 30, Disp: , Rfl:     cinacalcet (SENSIPAR) 60 MG tablet, Take 1 tablet by mouth Daily., Disp: , Rfl:     cloNIDine (Catapres) 0.1 MG tablet, Take 1 tablet by mouth Every 8 (Eight) Hours As Needed for High Blood Pressure (BP >180/100 mm Hg)., Disp: 10 tablet, Rfl: 5    denosumab (Prolia) 60 MG/ML solution prefilled syringe syringe, Inject 1 mL under the skin into the appropriate area as directed 1 (One) Time. 2 TIMES A YEAR, Disp: , Rfl:     famotidine (PEPCID) 20 MG tablet, Take  by mouth Daily., Disp: , Rfl:     irbesartan (AVAPRO) 150 MG tablet, TAKE 1 TABLET BY MOUTH EVERY DAY, Disp: 90 tablet, Rfl: 1    levothyroxine (Synthroid) 25 MCG tablet, Take 1 tablet by mouth Daily., Disp: 90 tablet, Rfl: 1    multivitamin with minerals (OCUVITE-LUTEIN PO), Take 1 tablet by mouth Daily., Disp: , Rfl:     nebivolol (BYSTOLIC) 10 MG tablet, TAKE 1 TABLET BY MOUTH DAILY, Disp: 90 tablet, Rfl: 1    nitroglycerin (NITROSTAT) 0.4 MG SL tablet, Place 1 tablet under the tongue Every 5 (Five) Minutes As Needed for Chest Pain. Take no more than 3 doses in 15 minutes., Disp: 25 tablet, Rfl: 5    omeprazole (priLOSEC) 40 MG capsule, Take 1 capsule by mouth 2 (Two) Times a Day Before Meals. Take a half hour before breakfast, Disp: 60 capsule, Rfl: 11    ondansetron ODT (ZOFRAN-ODT) 4 MG  disintegrating tablet, Place 1 tablet on the tongue Every 8 (Eight) Hours As Needed for Nausea or Vomiting., Disp: 20 tablet, Rfl: 0    Repatha SureClick solution auto-injector SureClick injection, ADMINISTER 1 ML UNDER THE SKIN EVERY 14 DAYS AS DIRECTED, Disp: 6 mL, Rfl: 3    clopidogrel (PLAVIX) 75 MG tablet, Take 1 tablet by mouth Daily., Disp: 90 tablet, Rfl: 3    PARoxetine (PAXIL) 10 MG tablet, Take 1 tablet by mouth Daily. (Patient not taking: Reported on 3/11/2025), Disp: , Rfl:           ROS  Review of Systems   Cardiovascular:  Positive for chest pain, dyspnea on exertion, leg swelling and palpitations.   Respiratory:  Positive for shortness of breath and snoring. Negative for cough and sputum production.        SOCIAL HX  Social History     Socioeconomic History    Marital status:    Tobacco Use    Smoking status: Never     Passive exposure: Never    Smokeless tobacco: Never   Vaping Use    Vaping status: Never Used   Substance and Sexual Activity    Alcohol use: No    Drug use: No    Sexual activity: Not Currently     Partners: Male       FAMILY HX  Family History   Problem Relation Age of Onset    Anemia Mother     Arthritis Mother     Heart disease Mother     Hypertension Mother     Stroke Mother     Broken bones Mother     Osteoporosis Mother     Heart attack Mother     Vision loss Mother     Cancer Father         stomach    Breast cancer Neg Hx     Ovarian cancer Neg Hx     Colon cancer Neg Hx     Endometrial cancer Neg Hx              Rick Qiu III, MD, FACC

## 2025-03-12 LAB
ALBUMIN SERPL-MCNC: 4.6 G/DL (ref 3.5–5.2)
ALBUMIN/GLOB SERPL: 2.2 G/DL
ALP SERPL-CCNC: 100 U/L (ref 39–117)
ALT SERPL W P-5'-P-CCNC: 7 U/L (ref 1–33)
ANION GAP SERPL CALCULATED.3IONS-SCNC: 10 MMOL/L (ref 5–15)
AST SERPL-CCNC: 19 U/L (ref 1–32)
BASOPHILS # BLD AUTO: 0.02 10*3/MM3 (ref 0–0.2)
BASOPHILS NFR BLD AUTO: 0.6 % (ref 0–1.5)
BILIRUB SERPL-MCNC: 0.4 MG/DL (ref 0–1.2)
BUN SERPL-MCNC: 18 MG/DL (ref 8–23)
BUN/CREAT SERPL: 21.7 (ref 7–25)
CALCIUM SPEC-SCNC: 10.7 MG/DL (ref 8.6–10.5)
CHLORIDE SERPL-SCNC: 103 MMOL/L (ref 98–107)
CHOLEST SERPL-MCNC: 145 MG/DL (ref 0–200)
CO2 SERPL-SCNC: 26 MMOL/L (ref 22–29)
CREAT SERPL-MCNC: 0.83 MG/DL (ref 0.57–1)
DEPRECATED RDW RBC AUTO: 42.5 FL (ref 37–54)
EGFRCR SERPLBLD CKD-EPI 2021: 68.8 ML/MIN/1.73
EOSINOPHIL # BLD AUTO: 0.02 10*3/MM3 (ref 0–0.4)
EOSINOPHIL NFR BLD AUTO: 0.6 % (ref 0.3–6.2)
ERYTHROCYTE [DISTWIDTH] IN BLOOD BY AUTOMATED COUNT: 13.3 % (ref 12.3–15.4)
GLOBULIN UR ELPH-MCNC: 2.1 GM/DL
GLUCOSE SERPL-MCNC: 90 MG/DL (ref 65–99)
HCT VFR BLD AUTO: 38.7 % (ref 34–46.6)
HDLC SERPL-MCNC: 73 MG/DL (ref 40–60)
HGB BLD-MCNC: 12.9 G/DL (ref 12–15.9)
IMM GRANULOCYTES # BLD AUTO: 0.06 10*3/MM3 (ref 0–0.05)
IMM GRANULOCYTES NFR BLD AUTO: 1.8 % (ref 0–0.5)
LDLC SERPL CALC-MCNC: 55 MG/DL (ref 0–100)
LDLC/HDLC SERPL: 0.73 {RATIO}
LYMPHOCYTES # BLD AUTO: 1.16 10*3/MM3 (ref 0.7–3.1)
LYMPHOCYTES NFR BLD AUTO: 34 % (ref 19.6–45.3)
MAGNESIUM SERPL-MCNC: 2.3 MG/DL (ref 1.6–2.4)
MCH RBC QN AUTO: 29.3 PG (ref 26.6–33)
MCHC RBC AUTO-ENTMCNC: 33.3 G/DL (ref 31.5–35.7)
MCV RBC AUTO: 88 FL (ref 79–97)
MONOCYTES # BLD AUTO: 0.65 10*3/MM3 (ref 0.1–0.9)
MONOCYTES NFR BLD AUTO: 19.1 % (ref 5–12)
NEUTROPHILS NFR BLD AUTO: 1.5 10*3/MM3 (ref 1.7–7)
NEUTROPHILS NFR BLD AUTO: 43.9 % (ref 42.7–76)
NRBC BLD AUTO-RTO: 0 /100 WBC (ref 0–0.2)
PLATELET # BLD AUTO: 142 10*3/MM3 (ref 140–450)
PMV BLD AUTO: 10.4 FL (ref 6–12)
POTASSIUM SERPL-SCNC: 4.4 MMOL/L (ref 3.5–5.2)
PROT SERPL-MCNC: 6.7 G/DL (ref 6–8.5)
RBC # BLD AUTO: 4.4 10*6/MM3 (ref 3.77–5.28)
SODIUM SERPL-SCNC: 139 MMOL/L (ref 136–145)
TRIGL SERPL-MCNC: 94 MG/DL (ref 0–150)
VLDLC SERPL-MCNC: 17 MG/DL (ref 5–40)
WBC NRBC COR # BLD AUTO: 3.41 10*3/MM3 (ref 3.4–10.8)

## 2025-04-28 RX ORDER — CLONIDINE HYDROCHLORIDE 0.1 MG/1
TABLET ORAL
Qty: 10 TABLET | Refills: 0 | Status: SHIPPED | OUTPATIENT
Start: 2025-04-28

## 2025-08-20 RX ORDER — EVOLOCUMAB 140 MG/ML
140 INJECTION, SOLUTION SUBCUTANEOUS
Qty: 6 ML | Refills: 1 | Status: SHIPPED | OUTPATIENT
Start: 2025-08-20

## (undated) DEVICE — DEV COMPR RADL PRELUDESYNCEZ 30ML 32CM

## (undated) DEVICE — CATH DIAG EXPO .045 FL3  5F 100CM

## (undated) DEVICE — MODEL BT2000 P/N 700287-012KIT CONTENTS: MANIFOLD WITH SALINE AND CONTRAST PORTS, SALINE TUBING WITH SPIKE AND HAND SYRINGE, TRANSDUCER: Brand: BT2000 AUTOMATED MANIFOLD KIT

## (undated) DEVICE — ADULT, W/LG. BACK PAD, RADIOTRANSPARENT ELEMENT AND LEAD WIRE: Brand: DEFIBRILLATION ELECTRODES

## (undated) DEVICE — PK CATH CARD 10

## (undated) DEVICE — ST INF PRI SMRTSTE 20DRP 2VLV 24ML 117

## (undated) DEVICE — CATH DIAG EXPO M/ PK 5F FL4/FR4 PIG

## (undated) DEVICE — INTRO SHEATH PRELUDE IDEAL SPRNG COIL 021 6F 23X80CM

## (undated) DEVICE — ST EXT IV SMRTSTE 2VLV FIX M LL 6ML 41

## (undated) DEVICE — GW PERIPH GUIDERIGHT STD/EXCHNG/J/TIP SS 0.035IN 5X260CM

## (undated) DEVICE — MODEL AT P65, P/N 701554-001KIT CONTENTS: HAND CONTROLLER, 3-WAY HIGH-PRESSURE STOPCOCK WITH ROTATING END AND PREMIUM HIGH-PRESSURE TUBING: Brand: ANGIOTOUCH® KIT